# Patient Record
Sex: MALE | Race: OTHER | HISPANIC OR LATINO | Employment: OTHER | URBAN - METROPOLITAN AREA
[De-identification: names, ages, dates, MRNs, and addresses within clinical notes are randomized per-mention and may not be internally consistent; named-entity substitution may affect disease eponyms.]

---

## 2017-01-04 ENCOUNTER — APPOINTMENT (OUTPATIENT)
Dept: LAB | Facility: CLINIC | Age: 82
End: 2017-01-04
Payer: COMMERCIAL

## 2017-01-04 ENCOUNTER — ALLSCRIPTS OFFICE VISIT (OUTPATIENT)
Dept: OTHER | Facility: OTHER | Age: 82
End: 2017-01-04

## 2017-01-04 ENCOUNTER — TRANSCRIBE ORDERS (OUTPATIENT)
Dept: LAB | Facility: CLINIC | Age: 82
End: 2017-01-04

## 2017-01-04 DIAGNOSIS — J18.9 PNEUMONIA: ICD-10-CM

## 2017-01-04 DIAGNOSIS — R53.83 OTHER FATIGUE: ICD-10-CM

## 2017-01-04 DIAGNOSIS — D64.9 ANEMIA: ICD-10-CM

## 2017-01-04 DIAGNOSIS — I10 ESSENTIAL (PRIMARY) HYPERTENSION: ICD-10-CM

## 2017-01-04 DIAGNOSIS — R82.90 ABNORMAL FINDING IN URINE: ICD-10-CM

## 2017-01-04 LAB
ALBUMIN SERPL BCP-MCNC: 2.9 G/DL (ref 3.5–5)
ALP SERPL-CCNC: 64 U/L (ref 46–116)
ALT SERPL W P-5'-P-CCNC: 9 U/L (ref 12–78)
ANION GAP SERPL CALCULATED.3IONS-SCNC: 7 MMOL/L (ref 4–13)
AST SERPL W P-5'-P-CCNC: 20 U/L (ref 5–45)
BACTERIA UR QL AUTO: NORMAL /HPF
BASOPHILS # BLD AUTO: 0.05 THOUSANDS/ΜL (ref 0–0.1)
BASOPHILS NFR BLD AUTO: 1 % (ref 0–1)
BILIRUB SERPL-MCNC: 0.95 MG/DL (ref 0.2–1)
BILIRUB UR QL STRIP: NEGATIVE
BUN SERPL-MCNC: 14 MG/DL (ref 5–25)
CALCIUM SERPL-MCNC: 9.4 MG/DL (ref 8.3–10.1)
CHLORIDE SERPL-SCNC: 100 MMOL/L (ref 100–108)
CLARITY UR: CLEAR
CO2 SERPL-SCNC: 31 MMOL/L (ref 21–32)
COLOR UR: YELLOW
CREAT SERPL-MCNC: 1.03 MG/DL (ref 0.6–1.3)
EOSINOPHIL # BLD AUTO: 0 THOUSAND/ΜL (ref 0–0.61)
EOSINOPHIL NFR BLD AUTO: 0 % (ref 0–6)
ERYTHROCYTE [DISTWIDTH] IN BLOOD BY AUTOMATED COUNT: 12.2 % (ref 11.6–15.1)
GFR SERPL CREATININE-BSD FRML MDRD: >60 ML/MIN/1.73SQ M
GLUCOSE SERPL-MCNC: 447 MG/DL (ref 65–140)
GLUCOSE UR STRIP-MCNC: ABNORMAL MG/DL
HCT VFR BLD AUTO: 33.9 % (ref 36.5–49.3)
HGB BLD-MCNC: 11.2 G/DL (ref 12–17)
HGB UR QL STRIP.AUTO: ABNORMAL
KETONES UR STRIP-MCNC: ABNORMAL MG/DL
LEUKOCYTE ESTERASE UR QL STRIP: NEGATIVE
LYMPHOCYTES # BLD AUTO: 1.5 THOUSANDS/ΜL (ref 0.6–4.47)
LYMPHOCYTES NFR BLD AUTO: 14 % (ref 14–44)
MCH RBC QN AUTO: 29.7 PG (ref 26.8–34.3)
MCHC RBC AUTO-ENTMCNC: 33 G/DL (ref 31.4–37.4)
MCV RBC AUTO: 90 FL (ref 82–98)
MONOCYTES # BLD AUTO: 1.02 THOUSAND/ΜL (ref 0.17–1.22)
MONOCYTES NFR BLD AUTO: 10 % (ref 4–12)
NEUTROPHILS # BLD AUTO: 8.15 THOUSANDS/ΜL (ref 1.85–7.62)
NEUTS SEG NFR BLD AUTO: 75 % (ref 43–75)
NITRITE UR QL STRIP: NEGATIVE
NON-SQ EPI CELLS URNS QL MICRO: NORMAL /HPF
NRBC BLD AUTO-RTO: 0 /100 WBCS
PH UR STRIP.AUTO: 6.5 [PH] (ref 4.5–8)
PLATELET # BLD AUTO: 205 THOUSANDS/UL (ref 149–390)
PMV BLD AUTO: 13.6 FL (ref 8.9–12.7)
POTASSIUM SERPL-SCNC: 3.3 MMOL/L (ref 3.5–5.3)
PROT SERPL-MCNC: 7.6 G/DL (ref 6.4–8.2)
PROT UR STRIP-MCNC: ABNORMAL MG/DL
RBC # BLD AUTO: 3.77 MILLION/UL (ref 3.88–5.62)
RBC #/AREA URNS AUTO: NORMAL /HPF
SODIUM SERPL-SCNC: 138 MMOL/L (ref 136–145)
SP GR UR STRIP.AUTO: 1.03 (ref 1–1.03)
TSH SERPL DL<=0.05 MIU/L-ACNC: 1.19 UIU/ML (ref 0.36–3.74)
UROBILINOGEN UR QL STRIP.AUTO: 1 E.U./DL
WBC # BLD AUTO: 10.77 THOUSAND/UL (ref 4.31–10.16)
WBC #/AREA URNS AUTO: NORMAL /HPF

## 2017-01-04 PROCEDURE — 81001 URINALYSIS AUTO W/SCOPE: CPT

## 2017-01-04 PROCEDURE — 36415 COLL VENOUS BLD VENIPUNCTURE: CPT

## 2017-01-04 PROCEDURE — 84443 ASSAY THYROID STIM HORMONE: CPT

## 2017-01-04 PROCEDURE — 80053 COMPREHEN METABOLIC PANEL: CPT

## 2017-01-04 PROCEDURE — 87086 URINE CULTURE/COLONY COUNT: CPT

## 2017-01-04 PROCEDURE — 85025 COMPLETE CBC W/AUTO DIFF WBC: CPT

## 2017-01-05 LAB — BACTERIA UR CULT: NORMAL

## 2017-01-06 ENCOUNTER — APPOINTMENT (EMERGENCY)
Dept: RADIOLOGY | Facility: HOSPITAL | Age: 82
DRG: 637 | End: 2017-01-06
Payer: COMMERCIAL

## 2017-01-06 ENCOUNTER — GENERIC CONVERSION - ENCOUNTER (OUTPATIENT)
Dept: OTHER | Facility: OTHER | Age: 82
End: 2017-01-06

## 2017-01-06 ENCOUNTER — HOSPITAL ENCOUNTER (INPATIENT)
Facility: HOSPITAL | Age: 82
LOS: 11 days | Discharge: HOME HEALTH | DRG: 637 | End: 2017-01-17
Attending: EMERGENCY MEDICINE | Admitting: INTERNAL MEDICINE
Payer: COMMERCIAL

## 2017-01-06 DIAGNOSIS — J06.9 URI (UPPER RESPIRATORY INFECTION): ICD-10-CM

## 2017-01-06 DIAGNOSIS — R68.89 FLU-LIKE SYMPTOMS: ICD-10-CM

## 2017-01-06 DIAGNOSIS — R26.2 AMBULATORY DYSFUNCTION: ICD-10-CM

## 2017-01-06 DIAGNOSIS — E86.0 DEHYDRATION: ICD-10-CM

## 2017-01-06 DIAGNOSIS — R53.1 WEAKNESS: Primary | ICD-10-CM

## 2017-01-06 DIAGNOSIS — F03.90 DEMENTIA (HCC): ICD-10-CM

## 2017-01-06 DIAGNOSIS — I10 HYPERTENSION: ICD-10-CM

## 2017-01-06 DIAGNOSIS — S90.822A: ICD-10-CM

## 2017-01-06 DIAGNOSIS — G20 PARKINSON'S DISEASE (HCC): ICD-10-CM

## 2017-01-06 LAB
ALBUMIN SERPL BCP-MCNC: 2.8 G/DL (ref 3.5–5)
ALP SERPL-CCNC: 78 U/L (ref 46–116)
ALT SERPL W P-5'-P-CCNC: 9 U/L (ref 12–78)
ANION GAP SERPL CALCULATED.3IONS-SCNC: 7 MMOL/L (ref 4–13)
AST SERPL W P-5'-P-CCNC: 18 U/L (ref 5–45)
ATRIAL RATE: 64 BPM
BACTERIA UR QL AUTO: ABNORMAL /HPF
BASOPHILS # BLD MANUAL: 0 THOUSAND/UL (ref 0–0.1)
BASOPHILS NFR MAR MANUAL: 0 % (ref 0–1)
BILIRUB SERPL-MCNC: 0.77 MG/DL (ref 0.2–1)
BILIRUB UR QL STRIP: NEGATIVE
BUN SERPL-MCNC: 12 MG/DL (ref 5–25)
CALCIUM SERPL-MCNC: 9.4 MG/DL (ref 8.3–10.1)
CHLORIDE SERPL-SCNC: 100 MMOL/L (ref 100–108)
CLARITY UR: CLEAR
CO2 SERPL-SCNC: 34 MMOL/L (ref 21–32)
COLOR UR: ABNORMAL
CREAT SERPL-MCNC: 0.86 MG/DL (ref 0.6–1.3)
EOSINOPHIL # BLD MANUAL: 0.1 THOUSAND/UL (ref 0–0.4)
EOSINOPHIL NFR BLD MANUAL: 1 % (ref 0–6)
ERYTHROCYTE [DISTWIDTH] IN BLOOD BY AUTOMATED COUNT: 12.1 % (ref 11.6–15.1)
FLUAV AG SPEC QL IA: NEGATIVE
FLUBV AG SPEC QL IA: NEGATIVE
GFR SERPL CREATININE-BSD FRML MDRD: >60 ML/MIN/1.73SQ M
GLUCOSE SERPL-MCNC: 375 MG/DL (ref 65–140)
GLUCOSE SERPL-MCNC: 385 MG/DL (ref 65–140)
GLUCOSE UR STRIP-MCNC: ABNORMAL MG/DL
HCT VFR BLD AUTO: 36.1 % (ref 36.5–49.3)
HGB BLD-MCNC: 12.1 G/DL (ref 12–17)
HGB UR QL STRIP.AUTO: ABNORMAL
KETONES UR STRIP-MCNC: ABNORMAL MG/DL
LACTATE SERPL-SCNC: 1.3 MMOL/L (ref 0.5–2)
LEUKOCYTE ESTERASE UR QL STRIP: ABNORMAL
LYMPHOCYTES # BLD AUTO: 1.04 THOUSAND/UL (ref 0.6–4.47)
LYMPHOCYTES # BLD AUTO: 10 % (ref 14–44)
MCH RBC QN AUTO: 30.5 PG (ref 26.8–34.3)
MCHC RBC AUTO-ENTMCNC: 33.5 G/DL (ref 31.4–37.4)
MCV RBC AUTO: 91 FL (ref 82–98)
MONOCYTES # BLD AUTO: 0.83 THOUSAND/UL (ref 0–1.22)
MONOCYTES NFR BLD: 8 % (ref 4–12)
MYELOCYTES NFR BLD MANUAL: 2 % (ref 0–1)
NEUTROPHILS # BLD MANUAL: 8.23 THOUSAND/UL (ref 1.85–7.62)
NEUTS BAND NFR BLD MANUAL: 3 % (ref 0–8)
NEUTS SEG NFR BLD AUTO: 76 % (ref 43–75)
NITRITE UR QL STRIP: NEGATIVE
NON-SQ EPI CELLS URNS QL MICRO: ABNORMAL /HPF
NRBC BLD AUTO-RTO: 0 /100 WBCS
NT-PROBNP SERPL-MCNC: 505 PG/ML
P AXIS: 35 DEGREES
PH UR STRIP.AUTO: 6.5 [PH] (ref 4.5–8)
PLATELET # BLD AUTO: 275 THOUSANDS/UL (ref 149–390)
PLATELET BLD QL SMEAR: ADEQUATE
PMV BLD AUTO: 13.2 FL (ref 8.9–12.7)
POTASSIUM SERPL-SCNC: 3.6 MMOL/L (ref 3.5–5.3)
PR INTERVAL: 112 MS
PROT SERPL-MCNC: 7.8 G/DL (ref 6.4–8.2)
PROT UR STRIP-MCNC: ABNORMAL MG/DL
QRS AXIS: 20 DEGREES
QRSD INTERVAL: 134 MS
QT INTERVAL: 446 MS
QTC INTERVAL: 460 MS
RBC # BLD AUTO: 3.97 MILLION/UL (ref 3.88–5.62)
RBC #/AREA URNS AUTO: ABNORMAL /HPF
RBC MORPH BLD: NORMAL
SODIUM SERPL-SCNC: 141 MMOL/L (ref 136–145)
SP GR UR STRIP.AUTO: 1.02 (ref 1–1.03)
SPECIMEN SOURCE: NORMAL
SPECIMEN SOURCE: NORMAL
T WAVE AXIS: 12 DEGREES
TOTAL CELLS COUNTED SPEC: 100
TROPONIN I BLD-MCNC: 0 NG/ML (ref 0–0.08)
TROPONIN I BLD-MCNC: 0 NG/ML (ref 0–0.08)
UROBILINOGEN UR QL STRIP.AUTO: 1 E.U./DL
VENTRICULAR RATE: 64 BPM
WBC # BLD AUTO: 10.42 THOUSAND/UL (ref 4.31–10.16)
WBC #/AREA URNS AUTO: ABNORMAL /HPF

## 2017-01-06 PROCEDURE — 96360 HYDRATION IV INFUSION INIT: CPT

## 2017-01-06 PROCEDURE — 85007 BL SMEAR W/DIFF WBC COUNT: CPT | Performed by: EMERGENCY MEDICINE

## 2017-01-06 PROCEDURE — 81002 URINALYSIS NONAUTO W/O SCOPE: CPT | Performed by: EMERGENCY MEDICINE

## 2017-01-06 PROCEDURE — 71020 HB CHEST X-RAY 2VW FRONTAL&LATL: CPT

## 2017-01-06 PROCEDURE — 80053 COMPREHEN METABOLIC PANEL: CPT

## 2017-01-06 PROCEDURE — 87400 INFLUENZA A/B EACH AG IA: CPT | Performed by: EMERGENCY MEDICINE

## 2017-01-06 PROCEDURE — 83880 ASSAY OF NATRIURETIC PEPTIDE: CPT | Performed by: EMERGENCY MEDICINE

## 2017-01-06 PROCEDURE — 99285 EMERGENCY DEPT VISIT HI MDM: CPT

## 2017-01-06 PROCEDURE — 94760 N-INVAS EAR/PLS OXIMETRY 1: CPT

## 2017-01-06 PROCEDURE — 81001 URINALYSIS AUTO W/SCOPE: CPT

## 2017-01-06 PROCEDURE — 93005 ELECTROCARDIOGRAM TRACING: CPT

## 2017-01-06 PROCEDURE — 94640 AIRWAY INHALATION TREATMENT: CPT

## 2017-01-06 PROCEDURE — 82948 REAGENT STRIP/BLOOD GLUCOSE: CPT

## 2017-01-06 PROCEDURE — 83605 ASSAY OF LACTIC ACID: CPT | Performed by: EMERGENCY MEDICINE

## 2017-01-06 PROCEDURE — 85027 COMPLETE CBC AUTOMATED: CPT | Performed by: EMERGENCY MEDICINE

## 2017-01-06 PROCEDURE — 87798 DETECT AGENT NOS DNA AMP: CPT | Performed by: EMERGENCY MEDICINE

## 2017-01-06 PROCEDURE — 84484 ASSAY OF TROPONIN QUANT: CPT

## 2017-01-06 PROCEDURE — 87086 URINE CULTURE/COLONY COUNT: CPT

## 2017-01-06 PROCEDURE — 36415 COLL VENOUS BLD VENIPUNCTURE: CPT | Performed by: EMERGENCY MEDICINE

## 2017-01-06 PROCEDURE — 93005 ELECTROCARDIOGRAM TRACING: CPT | Performed by: EMERGENCY MEDICINE

## 2017-01-06 RX ORDER — INSULIN GLARGINE 100 [IU]/ML
10 INJECTION, SOLUTION SUBCUTANEOUS
Status: DISCONTINUED | OUTPATIENT
Start: 2017-01-06 | End: 2017-01-07

## 2017-01-06 RX ORDER — PANTOPRAZOLE SODIUM 20 MG/1
20 TABLET, DELAYED RELEASE ORAL
Status: DISCONTINUED | OUTPATIENT
Start: 2017-01-07 | End: 2017-01-17 | Stop reason: HOSPADM

## 2017-01-06 RX ORDER — HALOPERIDOL 5 MG/ML
0.5 INJECTION INTRAMUSCULAR ONCE
Status: DISCONTINUED | OUTPATIENT
Start: 2017-01-06 | End: 2017-01-16

## 2017-01-06 RX ORDER — ACETAMINOPHEN 325 MG/1
650 TABLET ORAL EVERY 6 HOURS PRN
Status: DISCONTINUED | OUTPATIENT
Start: 2017-01-06 | End: 2017-01-17 | Stop reason: HOSPADM

## 2017-01-06 RX ORDER — OLANZAPINE 20 MG/1
20 TABLET ORAL
COMMUNITY
End: 2018-04-25 | Stop reason: SDUPTHER

## 2017-01-06 RX ORDER — OSELTAMIVIR PHOSPHATE 75 MG/1
75 CAPSULE ORAL EVERY 12 HOURS SCHEDULED
Status: DISCONTINUED | OUTPATIENT
Start: 2017-01-06 | End: 2017-01-07

## 2017-01-06 RX ORDER — ASPIRIN AND DIPYRIDAMOLE 25; 200 MG/1; MG/1
1 CAPSULE, EXTENDED RELEASE ORAL 2 TIMES DAILY
COMMUNITY

## 2017-01-06 RX ORDER — CARBIDOPA AND LEVODOPA 50; 200 MG/1; MG/1
1 TABLET, EXTENDED RELEASE ORAL 3 TIMES DAILY
Status: DISCONTINUED | OUTPATIENT
Start: 2017-01-06 | End: 2017-01-17 | Stop reason: HOSPADM

## 2017-01-06 RX ORDER — HYDRALAZINE HYDROCHLORIDE 20 MG/ML
10 INJECTION INTRAMUSCULAR; INTRAVENOUS EVERY 4 HOURS PRN
Status: DISCONTINUED | OUTPATIENT
Start: 2017-01-06 | End: 2017-01-17 | Stop reason: HOSPADM

## 2017-01-06 RX ORDER — LORATADINE 10 MG/1
10 TABLET ORAL DAILY
Status: DISCONTINUED | OUTPATIENT
Start: 2017-01-07 | End: 2017-01-17 | Stop reason: HOSPADM

## 2017-01-06 RX ORDER — OLANZAPINE 10 MG/1
20 TABLET ORAL
Status: DISCONTINUED | OUTPATIENT
Start: 2017-01-06 | End: 2017-01-17 | Stop reason: HOSPADM

## 2017-01-06 RX ORDER — LEVALBUTEROL 1.25 MG/.5ML
1.25 SOLUTION, CONCENTRATE RESPIRATORY (INHALATION)
Status: DISCONTINUED | OUTPATIENT
Start: 2017-01-06 | End: 2017-01-08

## 2017-01-06 RX ORDER — RIVASTIGMINE 4.6 MG/24H
1 PATCH, EXTENDED RELEASE TRANSDERMAL DAILY
Status: DISCONTINUED | OUTPATIENT
Start: 2017-01-07 | End: 2017-01-09

## 2017-01-06 RX ORDER — PRAVASTATIN SODIUM 80 MG/1
80 TABLET ORAL
Status: DISCONTINUED | OUTPATIENT
Start: 2017-01-06 | End: 2017-01-17 | Stop reason: HOSPADM

## 2017-01-06 RX ORDER — OMEPRAZOLE 20 MG/1
20 CAPSULE, DELAYED RELEASE ORAL DAILY
Status: ON HOLD | COMMUNITY
End: 2018-08-06

## 2017-01-06 RX ORDER — CHOLECALCIFEROL (VITAMIN D3) 50 MCG
2000 TABLET ORAL DAILY
COMMUNITY
End: 2018-02-21 | Stop reason: SDUPTHER

## 2017-01-06 RX ORDER — LORATADINE 10 MG/1
10 TABLET ORAL DAILY
COMMUNITY
End: 2018-08-06 | Stop reason: HOSPADM

## 2017-01-06 RX ORDER — LISINOPRIL 20 MG/1
20 TABLET ORAL DAILY
Status: DISCONTINUED | OUTPATIENT
Start: 2017-01-07 | End: 2017-01-17 | Stop reason: HOSPADM

## 2017-01-06 RX ORDER — SIMVASTATIN 40 MG
40 TABLET ORAL
COMMUNITY
End: 2018-04-25 | Stop reason: ALTCHOICE

## 2017-01-06 RX ORDER — SODIUM CHLORIDE 9 MG/ML
60 INJECTION, SOLUTION INTRAVENOUS CONTINUOUS
Status: DISCONTINUED | OUTPATIENT
Start: 2017-01-06 | End: 2017-01-07

## 2017-01-06 RX ORDER — LISINOPRIL 20 MG/1
20 TABLET ORAL DAILY
COMMUNITY
End: 2018-02-21 | Stop reason: SDUPTHER

## 2017-01-06 RX ORDER — INSULIN GLARGINE 100 [IU]/ML
10 INJECTION, SOLUTION SUBCUTANEOUS
COMMUNITY
End: 2017-01-17 | Stop reason: HOSPADM

## 2017-01-06 RX ORDER — CITALOPRAM 20 MG/1
20 TABLET ORAL DAILY
COMMUNITY
End: 2018-04-25 | Stop reason: SDUPTHER

## 2017-01-06 RX ORDER — HEPARIN SODIUM 5000 [USP'U]/ML
5000 INJECTION, SOLUTION INTRAVENOUS; SUBCUTANEOUS EVERY 8 HOURS SCHEDULED
Status: DISCONTINUED | OUTPATIENT
Start: 2017-01-06 | End: 2017-01-17 | Stop reason: HOSPADM

## 2017-01-06 RX ORDER — CITALOPRAM 20 MG/1
20 TABLET ORAL DAILY
Status: DISCONTINUED | OUTPATIENT
Start: 2017-01-07 | End: 2017-01-17 | Stop reason: HOSPADM

## 2017-01-06 RX ORDER — DOCUSATE SODIUM 100 MG/1
100 CAPSULE, LIQUID FILLED ORAL 2 TIMES DAILY
Status: DISCONTINUED | OUTPATIENT
Start: 2017-01-06 | End: 2017-01-17 | Stop reason: HOSPADM

## 2017-01-06 RX ORDER — RIVASTIGMINE 4.6 MG/24H
1 PATCH, EXTENDED RELEASE TRANSDERMAL DAILY
COMMUNITY
End: 2018-04-25

## 2017-01-06 RX ORDER — GUAIFENESIN 600 MG
600 TABLET, EXTENDED RELEASE 12 HR ORAL EVERY 12 HOURS
Status: DISCONTINUED | OUTPATIENT
Start: 2017-01-06 | End: 2017-01-16

## 2017-01-06 RX ORDER — ASPIRIN AND DIPYRIDAMOLE 25; 200 MG/1; MG/1
1 CAPSULE, EXTENDED RELEASE ORAL EVERY 12 HOURS
Status: DISCONTINUED | OUTPATIENT
Start: 2017-01-06 | End: 2017-01-17 | Stop reason: HOSPADM

## 2017-01-06 RX ADMIN — PRAVASTATIN SODIUM 80 MG: 80 TABLET ORAL at 22:29

## 2017-01-06 RX ADMIN — CARBIDOPA AND LEVODOPA 1 TABLET: 50; 200 TABLET, EXTENDED RELEASE ORAL at 22:00

## 2017-01-06 RX ADMIN — GUAIFENESIN 600 MG: 600 TABLET, EXTENDED RELEASE ORAL at 22:00

## 2017-01-06 RX ADMIN — IPRATROPIUM BROMIDE 0.5 MG: 0.5 SOLUTION RESPIRATORY (INHALATION) at 15:09

## 2017-01-06 RX ADMIN — HYDRALAZINE HYDROCHLORIDE 10 MG: 20 INJECTION INTRAMUSCULAR; INTRAVENOUS at 23:33

## 2017-01-06 RX ADMIN — SODIUM CHLORIDE 60 ML/HR: 0.9 INJECTION, SOLUTION INTRAVENOUS at 20:00

## 2017-01-06 RX ADMIN — HEPARIN SODIUM 5000 UNITS: 5000 INJECTION, SOLUTION INTRAVENOUS; SUBCUTANEOUS at 22:17

## 2017-01-06 RX ADMIN — IPRATROPIUM BROMIDE 0.5 MG: 0.5 SOLUTION RESPIRATORY (INHALATION) at 20:28

## 2017-01-06 RX ADMIN — SODIUM CHLORIDE 1000 ML: 0.9 INJECTION, SOLUTION INTRAVENOUS at 14:57

## 2017-01-06 RX ADMIN — ACETAMINOPHEN 650 MG: 325 TABLET, FILM COATED ORAL at 22:17

## 2017-01-06 RX ADMIN — OSELTAMIVIR PHOSPHATE 75 MG: 75 CAPSULE ORAL at 22:00

## 2017-01-06 RX ADMIN — INSULIN GLARGINE 10 UNITS: 100 INJECTION, SOLUTION SUBCUTANEOUS at 22:10

## 2017-01-06 RX ADMIN — AZITHROMYCIN MONOHYDRATE 500 MG: 500 INJECTION, POWDER, LYOPHILIZED, FOR SOLUTION INTRAVENOUS at 20:00

## 2017-01-06 RX ADMIN — OLANZAPINE 20 MG: 10 TABLET, FILM COATED ORAL at 22:00

## 2017-01-06 RX ADMIN — DOCUSATE SODIUM 100 MG: 100 CAPSULE, LIQUID FILLED ORAL at 22:30

## 2017-01-06 RX ADMIN — ALBUTEROL SULFATE 5 MG: 2.5 SOLUTION RESPIRATORY (INHALATION) at 15:09

## 2017-01-06 RX ADMIN — ASPIRIN AND DIPYRIDAMOLE 1 CAPSULE: 25; 200 CAPSULE, EXTENDED RELEASE ORAL at 22:00

## 2017-01-06 RX ADMIN — LEVALBUTEROL 1.25 MG: 1.25 SOLUTION, CONCENTRATE RESPIRATORY (INHALATION) at 20:28

## 2017-01-07 ENCOUNTER — APPOINTMENT (INPATIENT)
Dept: RADIOLOGY | Facility: HOSPITAL | Age: 82
DRG: 637 | End: 2017-01-07
Payer: COMMERCIAL

## 2017-01-07 LAB
ANION GAP SERPL CALCULATED.3IONS-SCNC: 7 MMOL/L (ref 4–13)
BACTERIA UR CULT: NORMAL
BUN SERPL-MCNC: 11 MG/DL (ref 5–25)
CALCIUM SERPL-MCNC: 8.3 MG/DL (ref 8.3–10.1)
CHLORIDE SERPL-SCNC: 105 MMOL/L (ref 100–108)
CO2 SERPL-SCNC: 29 MMOL/L (ref 21–32)
CREAT SERPL-MCNC: 0.71 MG/DL (ref 0.6–1.3)
ERYTHROCYTE [DISTWIDTH] IN BLOOD BY AUTOMATED COUNT: 12.1 % (ref 11.6–15.1)
EST. AVERAGE GLUCOSE BLD GHB EST-MCNC: 252 MG/DL
FLUAV AG SPEC QL: NORMAL
FLUBV AG SPEC QL: NORMAL
GFR SERPL CREATININE-BSD FRML MDRD: >60 ML/MIN/1.73SQ M
GLUCOSE SERPL-MCNC: 161 MG/DL (ref 65–140)
GLUCOSE SERPL-MCNC: 170 MG/DL (ref 65–140)
GLUCOSE SERPL-MCNC: 223 MG/DL (ref 65–140)
GLUCOSE SERPL-MCNC: 294 MG/DL (ref 65–140)
GLUCOSE SERPL-MCNC: 321 MG/DL (ref 65–140)
GLUCOSE SERPL-MCNC: >500 MG/DL (ref 65–140)
HBA1C MFR BLD: 10.4 % (ref 4.2–6.3)
HCT VFR BLD AUTO: 30.4 % (ref 36.5–49.3)
HGB BLD-MCNC: 10.1 G/DL (ref 12–17)
MCH RBC QN AUTO: 29.8 PG (ref 26.8–34.3)
MCHC RBC AUTO-ENTMCNC: 33.2 G/DL (ref 31.4–37.4)
MCV RBC AUTO: 90 FL (ref 82–98)
PLATELET # BLD AUTO: 239 THOUSANDS/UL (ref 149–390)
PMV BLD AUTO: 12.8 FL (ref 8.9–12.7)
POTASSIUM SERPL-SCNC: 2.5 MMOL/L (ref 3.5–5.3)
POTASSIUM SERPL-SCNC: 2.6 MMOL/L (ref 3.5–5.3)
RBC # BLD AUTO: 3.39 MILLION/UL (ref 3.88–5.62)
RSV B RNA SPEC QL NAA+PROBE: NORMAL
SODIUM SERPL-SCNC: 141 MMOL/L (ref 136–145)
WBC # BLD AUTO: 8.98 THOUSAND/UL (ref 4.31–10.16)

## 2017-01-07 PROCEDURE — 94640 AIRWAY INHALATION TREATMENT: CPT

## 2017-01-07 PROCEDURE — 85027 COMPLETE CBC AUTOMATED: CPT | Performed by: PHYSICIAN ASSISTANT

## 2017-01-07 PROCEDURE — 83036 HEMOGLOBIN GLYCOSYLATED A1C: CPT | Performed by: PHYSICIAN ASSISTANT

## 2017-01-07 PROCEDURE — 84132 ASSAY OF SERUM POTASSIUM: CPT | Performed by: PHYSICIAN ASSISTANT

## 2017-01-07 PROCEDURE — 73630 X-RAY EXAM OF FOOT: CPT

## 2017-01-07 PROCEDURE — 94760 N-INVAS EAR/PLS OXIMETRY 1: CPT

## 2017-01-07 PROCEDURE — 80048 BASIC METABOLIC PNL TOTAL CA: CPT | Performed by: PHYSICIAN ASSISTANT

## 2017-01-07 PROCEDURE — 82948 REAGENT STRIP/BLOOD GLUCOSE: CPT

## 2017-01-07 RX ORDER — SODIUM CHLORIDE AND POTASSIUM CHLORIDE .9; .15 G/100ML; G/100ML
60 SOLUTION INTRAVENOUS CONTINUOUS
Status: DISCONTINUED | OUTPATIENT
Start: 2017-01-07 | End: 2017-01-07

## 2017-01-07 RX ORDER — INSULIN GLARGINE 100 [IU]/ML
20 INJECTION, SOLUTION SUBCUTANEOUS
Status: DISCONTINUED | OUTPATIENT
Start: 2017-01-07 | End: 2017-01-07

## 2017-01-07 RX ORDER — INSULIN GLARGINE 100 [IU]/ML
10 INJECTION, SOLUTION SUBCUTANEOUS
Status: DISCONTINUED | OUTPATIENT
Start: 2017-01-07 | End: 2017-01-09

## 2017-01-07 RX ORDER — POTASSIUM CHLORIDE 20 MEQ/1
20 TABLET, EXTENDED RELEASE ORAL ONCE
Status: COMPLETED | OUTPATIENT
Start: 2017-01-07 | End: 2017-01-07

## 2017-01-07 RX ORDER — POTASSIUM CHLORIDE 20 MEQ/1
20 TABLET, EXTENDED RELEASE ORAL
Status: DISCONTINUED | OUTPATIENT
Start: 2017-01-07 | End: 2017-01-14

## 2017-01-07 RX ADMIN — LORATADINE 10 MG: 10 TABLET ORAL at 09:38

## 2017-01-07 RX ADMIN — AZITHROMYCIN MONOHYDRATE 500 MG: 500 INJECTION, POWDER, LYOPHILIZED, FOR SOLUTION INTRAVENOUS at 18:50

## 2017-01-07 RX ADMIN — IPRATROPIUM BROMIDE 0.5 MG: 0.5 SOLUTION RESPIRATORY (INHALATION) at 20:42

## 2017-01-07 RX ADMIN — DOCUSATE SODIUM 100 MG: 100 CAPSULE, LIQUID FILLED ORAL at 17:13

## 2017-01-07 RX ADMIN — HEPARIN SODIUM 5000 UNITS: 5000 INJECTION, SOLUTION INTRAVENOUS; SUBCUTANEOUS at 21:27

## 2017-01-07 RX ADMIN — CARBIDOPA AND LEVODOPA 1 TABLET: 50; 200 TABLET, EXTENDED RELEASE ORAL at 21:27

## 2017-01-07 RX ADMIN — LISINOPRIL 20 MG: 20 TABLET ORAL at 09:41

## 2017-01-07 RX ADMIN — HEPARIN SODIUM 5000 UNITS: 5000 INJECTION, SOLUTION INTRAVENOUS; SUBCUTANEOUS at 13:10

## 2017-01-07 RX ADMIN — LEVALBUTEROL 1.25 MG: 1.25 SOLUTION, CONCENTRATE RESPIRATORY (INHALATION) at 20:42

## 2017-01-07 RX ADMIN — POTASSIUM CHLORIDE 20 MEQ: 1500 TABLET, EXTENDED RELEASE ORAL at 16:21

## 2017-01-07 RX ADMIN — IPRATROPIUM BROMIDE 0.5 MG: 0.5 SOLUTION RESPIRATORY (INHALATION) at 07:14

## 2017-01-07 RX ADMIN — LEVALBUTEROL 1.25 MG: 1.25 SOLUTION, CONCENTRATE RESPIRATORY (INHALATION) at 07:14

## 2017-01-07 RX ADMIN — OLANZAPINE 20 MG: 10 TABLET, FILM COATED ORAL at 21:26

## 2017-01-07 RX ADMIN — INSULIN LISPRO 2 UNITS: 100 INJECTION, SOLUTION INTRAVENOUS; SUBCUTANEOUS at 09:39

## 2017-01-07 RX ADMIN — INSULIN LISPRO 8 UNITS: 100 INJECTION, SOLUTION INTRAVENOUS; SUBCUTANEOUS at 17:13

## 2017-01-07 RX ADMIN — GUAIFENESIN 600 MG: 600 TABLET, EXTENDED RELEASE ORAL at 09:41

## 2017-01-07 RX ADMIN — INSULIN LISPRO 6 UNITS: 100 INJECTION, SOLUTION INTRAVENOUS; SUBCUTANEOUS at 13:05

## 2017-01-07 RX ADMIN — PRAVASTATIN SODIUM 80 MG: 80 TABLET ORAL at 16:21

## 2017-01-07 RX ADMIN — INSULIN GLARGINE 10 UNITS: 100 INJECTION, SOLUTION SUBCUTANEOUS at 21:27

## 2017-01-07 RX ADMIN — HEPARIN SODIUM 5000 UNITS: 5000 INJECTION, SOLUTION INTRAVENOUS; SUBCUTANEOUS at 06:38

## 2017-01-07 RX ADMIN — GUAIFENESIN 600 MG: 600 TABLET, EXTENDED RELEASE ORAL at 21:27

## 2017-01-07 RX ADMIN — IPRATROPIUM BROMIDE 0.5 MG: 0.5 SOLUTION RESPIRATORY (INHALATION) at 13:14

## 2017-01-07 RX ADMIN — POTASSIUM CHLORIDE 20 MEQ: 1500 TABLET, EXTENDED RELEASE ORAL at 06:30

## 2017-01-07 RX ADMIN — LEVALBUTEROL 1.25 MG: 1.25 SOLUTION, CONCENTRATE RESPIRATORY (INHALATION) at 13:14

## 2017-01-07 RX ADMIN — CITALOPRAM HYDROBROMIDE 20 MG: 20 TABLET ORAL at 09:37

## 2017-01-07 RX ADMIN — CARBIDOPA AND LEVODOPA 1 TABLET: 50; 200 TABLET, EXTENDED RELEASE ORAL at 09:39

## 2017-01-07 RX ADMIN — ASPIRIN AND DIPYRIDAMOLE 1 CAPSULE: 25; 200 CAPSULE, EXTENDED RELEASE ORAL at 21:27

## 2017-01-07 RX ADMIN — DOCUSATE SODIUM 100 MG: 100 CAPSULE, LIQUID FILLED ORAL at 09:37

## 2017-01-07 RX ADMIN — POTASSIUM CHLORIDE 20 MEQ: 1500 TABLET, EXTENDED RELEASE ORAL at 13:05

## 2017-01-07 RX ADMIN — PANTOPRAZOLE SODIUM 20 MG: 20 TABLET, DELAYED RELEASE ORAL at 06:38

## 2017-01-07 RX ADMIN — SODIUM CHLORIDE AND POTASSIUM CHLORIDE 60 ML/HR: .9; .15 SOLUTION INTRAVENOUS at 07:15

## 2017-01-07 RX ADMIN — RIVASTIGMINE TRANSDERMAL SYSTEM 1 PATCH: 4.6 PATCH, EXTENDED RELEASE TRANSDERMAL at 09:38

## 2017-01-07 RX ADMIN — CARBIDOPA AND LEVODOPA 1 TABLET: 50; 200 TABLET, EXTENDED RELEASE ORAL at 16:21

## 2017-01-07 RX ADMIN — ASPIRIN AND DIPYRIDAMOLE 1 CAPSULE: 25; 200 CAPSULE, EXTENDED RELEASE ORAL at 09:38

## 2017-01-07 RX ADMIN — OSELTAMIVIR PHOSPHATE 75 MG: 75 CAPSULE ORAL at 09:38

## 2017-01-08 LAB
ANION GAP SERPL CALCULATED.3IONS-SCNC: 7 MMOL/L (ref 4–13)
BUN SERPL-MCNC: 7 MG/DL (ref 5–25)
CALCIUM SERPL-MCNC: 8.2 MG/DL (ref 8.3–10.1)
CHLORIDE SERPL-SCNC: 110 MMOL/L (ref 100–108)
CO2 SERPL-SCNC: 28 MMOL/L (ref 21–32)
CREAT SERPL-MCNC: 0.6 MG/DL (ref 0.6–1.3)
GFR SERPL CREATININE-BSD FRML MDRD: >60 ML/MIN/1.73SQ M
GLUCOSE SERPL-MCNC: 120 MG/DL (ref 65–140)
GLUCOSE SERPL-MCNC: 122 MG/DL (ref 65–140)
GLUCOSE SERPL-MCNC: 222 MG/DL (ref 65–140)
GLUCOSE SERPL-MCNC: 229 MG/DL (ref 65–140)
GLUCOSE SERPL-MCNC: 269 MG/DL (ref 65–140)
POTASSIUM SERPL-SCNC: 3.3 MMOL/L (ref 3.5–5.3)
SODIUM SERPL-SCNC: 145 MMOL/L (ref 136–145)

## 2017-01-08 PROCEDURE — G8979 MOBILITY GOAL STATUS: HCPCS

## 2017-01-08 PROCEDURE — 94640 AIRWAY INHALATION TREATMENT: CPT

## 2017-01-08 PROCEDURE — 94760 N-INVAS EAR/PLS OXIMETRY 1: CPT

## 2017-01-08 PROCEDURE — 82948 REAGENT STRIP/BLOOD GLUCOSE: CPT

## 2017-01-08 PROCEDURE — G8978 MOBILITY CURRENT STATUS: HCPCS

## 2017-01-08 PROCEDURE — 97530 THERAPEUTIC ACTIVITIES: CPT

## 2017-01-08 PROCEDURE — 80048 BASIC METABOLIC PNL TOTAL CA: CPT | Performed by: INTERNAL MEDICINE

## 2017-01-08 PROCEDURE — 97163 PT EVAL HIGH COMPLEX 45 MIN: CPT

## 2017-01-08 RX ORDER — LEVALBUTEROL 1.25 MG/.5ML
1.25 SOLUTION, CONCENTRATE RESPIRATORY (INHALATION) EVERY 8 HOURS PRN
Status: DISCONTINUED | OUTPATIENT
Start: 2017-01-08 | End: 2017-01-12

## 2017-01-08 RX ADMIN — GUAIFENESIN 600 MG: 600 TABLET, EXTENDED RELEASE ORAL at 20:59

## 2017-01-08 RX ADMIN — CITALOPRAM HYDROBROMIDE 20 MG: 20 TABLET ORAL at 10:12

## 2017-01-08 RX ADMIN — HEPARIN SODIUM 5000 UNITS: 5000 INJECTION, SOLUTION INTRAVENOUS; SUBCUTANEOUS at 21:01

## 2017-01-08 RX ADMIN — AZITHROMYCIN MONOHYDRATE 500 MG: 500 INJECTION, POWDER, LYOPHILIZED, FOR SOLUTION INTRAVENOUS at 20:55

## 2017-01-08 RX ADMIN — LISINOPRIL 20 MG: 20 TABLET ORAL at 10:12

## 2017-01-08 RX ADMIN — INSULIN GLARGINE 10 UNITS: 100 INJECTION, SOLUTION SUBCUTANEOUS at 21:02

## 2017-01-08 RX ADMIN — INSULIN LISPRO 6 UNITS: 100 INJECTION, SOLUTION INTRAVENOUS; SUBCUTANEOUS at 17:51

## 2017-01-08 RX ADMIN — DOCUSATE SODIUM 100 MG: 100 CAPSULE, LIQUID FILLED ORAL at 10:13

## 2017-01-08 RX ADMIN — ACETAMINOPHEN 650 MG: 325 TABLET, FILM COATED ORAL at 11:59

## 2017-01-08 RX ADMIN — RIVASTIGMINE TRANSDERMAL SYSTEM 1 PATCH: 4.6 PATCH, EXTENDED RELEASE TRANSDERMAL at 10:16

## 2017-01-08 RX ADMIN — POTASSIUM CHLORIDE 20 MEQ: 1500 TABLET, EXTENDED RELEASE ORAL at 17:51

## 2017-01-08 RX ADMIN — INSULIN LISPRO 4 UNITS: 100 INJECTION, SOLUTION INTRAVENOUS; SUBCUTANEOUS at 11:59

## 2017-01-08 RX ADMIN — CARBIDOPA AND LEVODOPA 1 TABLET: 50; 200 TABLET, EXTENDED RELEASE ORAL at 21:00

## 2017-01-08 RX ADMIN — ASPIRIN AND DIPYRIDAMOLE 1 CAPSULE: 25; 200 CAPSULE, EXTENDED RELEASE ORAL at 10:12

## 2017-01-08 RX ADMIN — OLANZAPINE 20 MG: 10 TABLET, FILM COATED ORAL at 21:00

## 2017-01-08 RX ADMIN — GUAIFENESIN 600 MG: 600 TABLET, EXTENDED RELEASE ORAL at 10:12

## 2017-01-08 RX ADMIN — CARBIDOPA AND LEVODOPA 1 TABLET: 50; 200 TABLET, EXTENDED RELEASE ORAL at 17:51

## 2017-01-08 RX ADMIN — HEPARIN SODIUM 5000 UNITS: 5000 INJECTION, SOLUTION INTRAVENOUS; SUBCUTANEOUS at 06:50

## 2017-01-08 RX ADMIN — LORATADINE 10 MG: 10 TABLET ORAL at 10:13

## 2017-01-08 RX ADMIN — LEVALBUTEROL 1.25 MG: 1.25 SOLUTION, CONCENTRATE RESPIRATORY (INHALATION) at 07:08

## 2017-01-08 RX ADMIN — HEPARIN SODIUM 5000 UNITS: 5000 INJECTION, SOLUTION INTRAVENOUS; SUBCUTANEOUS at 14:50

## 2017-01-08 RX ADMIN — PRAVASTATIN SODIUM 80 MG: 80 TABLET ORAL at 17:51

## 2017-01-08 RX ADMIN — ASPIRIN AND DIPYRIDAMOLE 1 CAPSULE: 25; 200 CAPSULE, EXTENDED RELEASE ORAL at 21:00

## 2017-01-08 RX ADMIN — POTASSIUM CHLORIDE 20 MEQ: 1500 TABLET, EXTENDED RELEASE ORAL at 10:13

## 2017-01-08 RX ADMIN — DOCUSATE SODIUM 100 MG: 100 CAPSULE, LIQUID FILLED ORAL at 17:51

## 2017-01-08 RX ADMIN — PANTOPRAZOLE SODIUM 20 MG: 20 TABLET, DELAYED RELEASE ORAL at 06:59

## 2017-01-08 RX ADMIN — IPRATROPIUM BROMIDE 0.5 MG: 0.5 SOLUTION RESPIRATORY (INHALATION) at 07:08

## 2017-01-08 RX ADMIN — CARBIDOPA AND LEVODOPA 1 TABLET: 50; 200 TABLET, EXTENDED RELEASE ORAL at 10:13

## 2017-01-08 RX ADMIN — POTASSIUM CHLORIDE 20 MEQ: 1500 TABLET, EXTENDED RELEASE ORAL at 11:59

## 2017-01-09 ENCOUNTER — APPOINTMENT (INPATIENT)
Dept: ULTRASOUND IMAGING | Facility: HOSPITAL | Age: 82
DRG: 637 | End: 2017-01-09
Payer: COMMERCIAL

## 2017-01-09 LAB
ANION GAP SERPL CALCULATED.3IONS-SCNC: 9 MMOL/L (ref 4–13)
BUN SERPL-MCNC: 7 MG/DL (ref 5–25)
CALCIUM SERPL-MCNC: 8.3 MG/DL (ref 8.3–10.1)
CHLORIDE SERPL-SCNC: 104 MMOL/L (ref 100–108)
CO2 SERPL-SCNC: 26 MMOL/L (ref 21–32)
CREAT SERPL-MCNC: 0.7 MG/DL (ref 0.6–1.3)
GFR SERPL CREATININE-BSD FRML MDRD: >60 ML/MIN/1.73SQ M
GLUCOSE SERPL-MCNC: 111 MG/DL (ref 65–140)
GLUCOSE SERPL-MCNC: 187 MG/DL (ref 65–140)
GLUCOSE SERPL-MCNC: 189 MG/DL (ref 65–140)
GLUCOSE SERPL-MCNC: 215 MG/DL (ref 65–140)
GLUCOSE SERPL-MCNC: 276 MG/DL (ref 65–140)
POTASSIUM SERPL-SCNC: 4.2 MMOL/L (ref 3.5–5.3)
PSA SERPL-MCNC: 14.4 NG/ML (ref 0–4)
SODIUM SERPL-SCNC: 139 MMOL/L (ref 136–145)

## 2017-01-09 PROCEDURE — 82948 REAGENT STRIP/BLOOD GLUCOSE: CPT

## 2017-01-09 PROCEDURE — 97530 THERAPEUTIC ACTIVITIES: CPT

## 2017-01-09 PROCEDURE — 97112 NEUROMUSCULAR REEDUCATION: CPT

## 2017-01-09 PROCEDURE — 94760 N-INVAS EAR/PLS OXIMETRY 1: CPT

## 2017-01-09 PROCEDURE — 76770 US EXAM ABDO BACK WALL COMP: CPT

## 2017-01-09 PROCEDURE — G0103 PSA SCREENING: HCPCS | Performed by: HOSPITALIST

## 2017-01-09 PROCEDURE — 80048 BASIC METABOLIC PNL TOTAL CA: CPT | Performed by: HOSPITALIST

## 2017-01-09 RX ORDER — POTASSIUM CHLORIDE 20MEQ/15ML
40 LIQUID (ML) ORAL ONCE
Status: DISCONTINUED | OUTPATIENT
Start: 2017-01-09 | End: 2017-01-16

## 2017-01-09 RX ORDER — INSULIN GLARGINE 100 [IU]/ML
15 INJECTION, SOLUTION SUBCUTANEOUS
Status: DISCONTINUED | OUTPATIENT
Start: 2017-01-09 | End: 2017-01-11

## 2017-01-09 RX ADMIN — CITALOPRAM HYDROBROMIDE 20 MG: 20 TABLET ORAL at 08:41

## 2017-01-09 RX ADMIN — POTASSIUM CHLORIDE 20 MEQ: 1500 TABLET, EXTENDED RELEASE ORAL at 11:38

## 2017-01-09 RX ADMIN — POTASSIUM CHLORIDE 20 MEQ: 1500 TABLET, EXTENDED RELEASE ORAL at 16:05

## 2017-01-09 RX ADMIN — PANTOPRAZOLE SODIUM 20 MG: 20 TABLET, DELAYED RELEASE ORAL at 05:28

## 2017-01-09 RX ADMIN — DOCUSATE SODIUM 100 MG: 100 CAPSULE, LIQUID FILLED ORAL at 17:23

## 2017-01-09 RX ADMIN — GUAIFENESIN 600 MG: 600 TABLET, EXTENDED RELEASE ORAL at 20:18

## 2017-01-09 RX ADMIN — POTASSIUM CHLORIDE 20 MEQ: 1500 TABLET, EXTENDED RELEASE ORAL at 08:41

## 2017-01-09 RX ADMIN — CARBIDOPA AND LEVODOPA 1 TABLET: 50; 200 TABLET, EXTENDED RELEASE ORAL at 08:41

## 2017-01-09 RX ADMIN — ASPIRIN AND DIPYRIDAMOLE 1 CAPSULE: 25; 200 CAPSULE, EXTENDED RELEASE ORAL at 20:19

## 2017-01-09 RX ADMIN — HEPARIN SODIUM 5000 UNITS: 5000 INJECTION, SOLUTION INTRAVENOUS; SUBCUTANEOUS at 13:00

## 2017-01-09 RX ADMIN — LORATADINE 10 MG: 10 TABLET ORAL at 08:41

## 2017-01-09 RX ADMIN — INSULIN LISPRO 2 UNITS: 100 INJECTION, SOLUTION INTRAVENOUS; SUBCUTANEOUS at 17:24

## 2017-01-09 RX ADMIN — DOCUSATE SODIUM 100 MG: 100 CAPSULE, LIQUID FILLED ORAL at 08:41

## 2017-01-09 RX ADMIN — OLANZAPINE 20 MG: 10 TABLET, FILM COATED ORAL at 21:38

## 2017-01-09 RX ADMIN — CARBIDOPA AND LEVODOPA 1 TABLET: 50; 200 TABLET, EXTENDED RELEASE ORAL at 16:05

## 2017-01-09 RX ADMIN — ASPIRIN AND DIPYRIDAMOLE 1 CAPSULE: 25; 200 CAPSULE, EXTENDED RELEASE ORAL at 08:41

## 2017-01-09 RX ADMIN — GUAIFENESIN 600 MG: 600 TABLET, EXTENDED RELEASE ORAL at 08:41

## 2017-01-09 RX ADMIN — PRAVASTATIN SODIUM 80 MG: 80 TABLET ORAL at 16:05

## 2017-01-09 RX ADMIN — HEPARIN SODIUM 5000 UNITS: 5000 INJECTION, SOLUTION INTRAVENOUS; SUBCUTANEOUS at 05:29

## 2017-01-09 RX ADMIN — INSULIN LISPRO 4 UNITS: 100 INJECTION, SOLUTION INTRAVENOUS; SUBCUTANEOUS at 08:43

## 2017-01-09 RX ADMIN — CARBIDOPA AND LEVODOPA 1 TABLET: 50; 200 TABLET, EXTENDED RELEASE ORAL at 20:19

## 2017-01-09 RX ADMIN — HEPARIN SODIUM 5000 UNITS: 5000 INJECTION, SOLUTION INTRAVENOUS; SUBCUTANEOUS at 21:35

## 2017-01-09 RX ADMIN — HYDRALAZINE HYDROCHLORIDE 10 MG: 20 INJECTION INTRAMUSCULAR; INTRAVENOUS at 00:00

## 2017-01-09 RX ADMIN — INSULIN GLARGINE 15 UNITS: 100 INJECTION, SOLUTION SUBCUTANEOUS at 21:36

## 2017-01-09 RX ADMIN — LISINOPRIL 20 MG: 20 TABLET ORAL at 08:41

## 2017-01-10 LAB
GLUCOSE SERPL-MCNC: 101 MG/DL (ref 65–140)
GLUCOSE SERPL-MCNC: 155 MG/DL (ref 65–140)
GLUCOSE SERPL-MCNC: 170 MG/DL (ref 65–140)
GLUCOSE SERPL-MCNC: 210 MG/DL (ref 65–140)

## 2017-01-10 PROCEDURE — 97166 OT EVAL MOD COMPLEX 45 MIN: CPT

## 2017-01-10 PROCEDURE — 82948 REAGENT STRIP/BLOOD GLUCOSE: CPT

## 2017-01-10 PROCEDURE — G8988 SELF CARE GOAL STATUS: HCPCS

## 2017-01-10 PROCEDURE — G8987 SELF CARE CURRENT STATUS: HCPCS

## 2017-01-10 PROCEDURE — 97530 THERAPEUTIC ACTIVITIES: CPT

## 2017-01-10 PROCEDURE — 94760 N-INVAS EAR/PLS OXIMETRY 1: CPT

## 2017-01-10 RX ADMIN — POTASSIUM CHLORIDE 20 MEQ: 1500 TABLET, EXTENDED RELEASE ORAL at 17:51

## 2017-01-10 RX ADMIN — CARBIDOPA AND LEVODOPA 1 TABLET: 50; 200 TABLET, EXTENDED RELEASE ORAL at 17:52

## 2017-01-10 RX ADMIN — HEPARIN SODIUM 5000 UNITS: 5000 INJECTION, SOLUTION INTRAVENOUS; SUBCUTANEOUS at 21:58

## 2017-01-10 RX ADMIN — PANTOPRAZOLE SODIUM 20 MG: 20 TABLET, DELAYED RELEASE ORAL at 05:16

## 2017-01-10 RX ADMIN — GUAIFENESIN 600 MG: 600 TABLET, EXTENDED RELEASE ORAL at 21:51

## 2017-01-10 RX ADMIN — ASPIRIN AND DIPYRIDAMOLE 1 CAPSULE: 25; 200 CAPSULE, EXTENDED RELEASE ORAL at 21:00

## 2017-01-10 RX ADMIN — ASPIRIN AND DIPYRIDAMOLE 1 CAPSULE: 25; 200 CAPSULE, EXTENDED RELEASE ORAL at 08:03

## 2017-01-10 RX ADMIN — POTASSIUM CHLORIDE 20 MEQ: 1500 TABLET, EXTENDED RELEASE ORAL at 11:32

## 2017-01-10 RX ADMIN — CARBIDOPA AND LEVODOPA 1 TABLET: 50; 200 TABLET, EXTENDED RELEASE ORAL at 21:51

## 2017-01-10 RX ADMIN — DOCUSATE SODIUM 100 MG: 100 CAPSULE, LIQUID FILLED ORAL at 08:02

## 2017-01-10 RX ADMIN — PRAVASTATIN SODIUM 80 MG: 80 TABLET ORAL at 17:51

## 2017-01-10 RX ADMIN — INSULIN GLARGINE 15 UNITS: 100 INJECTION, SOLUTION SUBCUTANEOUS at 22:14

## 2017-01-10 RX ADMIN — CARBIDOPA AND LEVODOPA 1 TABLET: 50; 200 TABLET, EXTENDED RELEASE ORAL at 08:03

## 2017-01-10 RX ADMIN — POTASSIUM CHLORIDE 20 MEQ: 1500 TABLET, EXTENDED RELEASE ORAL at 08:02

## 2017-01-10 RX ADMIN — LISINOPRIL 20 MG: 20 TABLET ORAL at 08:02

## 2017-01-10 RX ADMIN — OLANZAPINE 20 MG: 10 TABLET, FILM COATED ORAL at 22:02

## 2017-01-10 RX ADMIN — AZITHROMYCIN MONOHYDRATE 500 MG: 500 INJECTION, POWDER, LYOPHILIZED, FOR SOLUTION INTRAVENOUS at 23:41

## 2017-01-10 RX ADMIN — HEPARIN SODIUM 5000 UNITS: 5000 INJECTION, SOLUTION INTRAVENOUS; SUBCUTANEOUS at 13:23

## 2017-01-10 RX ADMIN — HYDRALAZINE HYDROCHLORIDE 10 MG: 20 INJECTION INTRAMUSCULAR; INTRAVENOUS at 08:03

## 2017-01-10 RX ADMIN — AZITHROMYCIN MONOHYDRATE 500 MG: 500 INJECTION, POWDER, LYOPHILIZED, FOR SOLUTION INTRAVENOUS at 00:35

## 2017-01-10 RX ADMIN — DOCUSATE SODIUM 100 MG: 100 CAPSULE, LIQUID FILLED ORAL at 17:51

## 2017-01-10 RX ADMIN — LORATADINE 10 MG: 10 TABLET ORAL at 08:02

## 2017-01-10 RX ADMIN — CITALOPRAM HYDROBROMIDE 20 MG: 20 TABLET ORAL at 08:02

## 2017-01-10 RX ADMIN — INSULIN LISPRO 4 UNITS: 100 INJECTION, SOLUTION INTRAVENOUS; SUBCUTANEOUS at 11:26

## 2017-01-10 RX ADMIN — HEPARIN SODIUM 5000 UNITS: 5000 INJECTION, SOLUTION INTRAVENOUS; SUBCUTANEOUS at 05:16

## 2017-01-10 RX ADMIN — GUAIFENESIN 600 MG: 600 TABLET, EXTENDED RELEASE ORAL at 08:02

## 2017-01-10 RX ADMIN — INSULIN LISPRO 2 UNITS: 100 INJECTION, SOLUTION INTRAVENOUS; SUBCUTANEOUS at 17:51

## 2017-01-11 LAB
GLUCOSE SERPL-MCNC: 147 MG/DL (ref 65–140)
GLUCOSE SERPL-MCNC: 201 MG/DL (ref 65–140)
GLUCOSE SERPL-MCNC: 209 MG/DL (ref 65–140)
GLUCOSE SERPL-MCNC: 250 MG/DL (ref 65–140)

## 2017-01-11 PROCEDURE — 94760 N-INVAS EAR/PLS OXIMETRY 1: CPT

## 2017-01-11 PROCEDURE — 82948 REAGENT STRIP/BLOOD GLUCOSE: CPT

## 2017-01-11 RX ORDER — INSULIN GLARGINE 100 [IU]/ML
20 INJECTION, SOLUTION SUBCUTANEOUS
Status: DISCONTINUED | OUTPATIENT
Start: 2017-01-11 | End: 2017-01-13

## 2017-01-11 RX ADMIN — ACETAMINOPHEN 650 MG: 325 TABLET, FILM COATED ORAL at 13:17

## 2017-01-11 RX ADMIN — GUAIFENESIN 600 MG: 600 TABLET, EXTENDED RELEASE ORAL at 21:00

## 2017-01-11 RX ADMIN — ASPIRIN AND DIPYRIDAMOLE 1 CAPSULE: 25; 200 CAPSULE, EXTENDED RELEASE ORAL at 08:44

## 2017-01-11 RX ADMIN — POTASSIUM CHLORIDE 20 MEQ: 1500 TABLET, EXTENDED RELEASE ORAL at 12:08

## 2017-01-11 RX ADMIN — HEPARIN SODIUM 5000 UNITS: 5000 INJECTION, SOLUTION INTRAVENOUS; SUBCUTANEOUS at 06:22

## 2017-01-11 RX ADMIN — INSULIN LISPRO 4 UNITS: 100 INJECTION, SOLUTION INTRAVENOUS; SUBCUTANEOUS at 08:45

## 2017-01-11 RX ADMIN — INSULIN LISPRO 6 UNITS: 100 INJECTION, SOLUTION INTRAVENOUS; SUBCUTANEOUS at 12:08

## 2017-01-11 RX ADMIN — HEPARIN SODIUM 5000 UNITS: 5000 INJECTION, SOLUTION INTRAVENOUS; SUBCUTANEOUS at 22:02

## 2017-01-11 RX ADMIN — PANTOPRAZOLE SODIUM 20 MG: 20 TABLET, DELAYED RELEASE ORAL at 06:25

## 2017-01-11 RX ADMIN — LISINOPRIL 20 MG: 20 TABLET ORAL at 08:44

## 2017-01-11 RX ADMIN — POTASSIUM CHLORIDE 20 MEQ: 1500 TABLET, EXTENDED RELEASE ORAL at 08:44

## 2017-01-11 RX ADMIN — CITALOPRAM HYDROBROMIDE 20 MG: 20 TABLET ORAL at 08:44

## 2017-01-11 RX ADMIN — OLANZAPINE 20 MG: 10 TABLET, FILM COATED ORAL at 22:07

## 2017-01-11 RX ADMIN — POTASSIUM CHLORIDE 20 MEQ: 1500 TABLET, EXTENDED RELEASE ORAL at 15:57

## 2017-01-11 RX ADMIN — INSULIN GLARGINE 20 UNITS: 100 INJECTION, SOLUTION SUBCUTANEOUS at 22:03

## 2017-01-11 RX ADMIN — ASPIRIN AND DIPYRIDAMOLE 1 CAPSULE: 25; 200 CAPSULE, EXTENDED RELEASE ORAL at 21:00

## 2017-01-11 RX ADMIN — CARBIDOPA AND LEVODOPA 1 TABLET: 50; 200 TABLET, EXTENDED RELEASE ORAL at 21:00

## 2017-01-11 RX ADMIN — GUAIFENESIN 600 MG: 600 TABLET, EXTENDED RELEASE ORAL at 08:44

## 2017-01-11 RX ADMIN — DOCUSATE SODIUM 100 MG: 100 CAPSULE, LIQUID FILLED ORAL at 08:44

## 2017-01-11 RX ADMIN — HEPARIN SODIUM 5000 UNITS: 5000 INJECTION, SOLUTION INTRAVENOUS; SUBCUTANEOUS at 13:17

## 2017-01-11 RX ADMIN — DOCUSATE SODIUM 100 MG: 100 CAPSULE, LIQUID FILLED ORAL at 17:11

## 2017-01-11 RX ADMIN — CARBIDOPA AND LEVODOPA 1 TABLET: 50; 200 TABLET, EXTENDED RELEASE ORAL at 08:44

## 2017-01-11 RX ADMIN — CARBIDOPA AND LEVODOPA 1 TABLET: 50; 200 TABLET, EXTENDED RELEASE ORAL at 15:57

## 2017-01-11 RX ADMIN — LORATADINE 10 MG: 10 TABLET ORAL at 08:44

## 2017-01-11 RX ADMIN — PRAVASTATIN SODIUM 80 MG: 80 TABLET ORAL at 15:57

## 2017-01-12 LAB
ANION GAP SERPL CALCULATED.3IONS-SCNC: 9 MMOL/L (ref 4–13)
BUN SERPL-MCNC: 11 MG/DL (ref 5–25)
CALCIUM SERPL-MCNC: 9.1 MG/DL (ref 8.3–10.1)
CHLORIDE SERPL-SCNC: 108 MMOL/L (ref 100–108)
CO2 SERPL-SCNC: 27 MMOL/L (ref 21–32)
CREAT SERPL-MCNC: 0.89 MG/DL (ref 0.6–1.3)
GFR SERPL CREATININE-BSD FRML MDRD: >60 ML/MIN/1.73SQ M
GLUCOSE SERPL-MCNC: 114 MG/DL (ref 65–140)
GLUCOSE SERPL-MCNC: 203 MG/DL (ref 65–140)
GLUCOSE SERPL-MCNC: 205 MG/DL (ref 65–140)
GLUCOSE SERPL-MCNC: 83 MG/DL (ref 65–140)
GLUCOSE SERPL-MCNC: 95 MG/DL (ref 65–140)
POTASSIUM SERPL-SCNC: 4.5 MMOL/L (ref 3.5–5.3)
SODIUM SERPL-SCNC: 144 MMOL/L (ref 136–145)

## 2017-01-12 PROCEDURE — 82948 REAGENT STRIP/BLOOD GLUCOSE: CPT

## 2017-01-12 PROCEDURE — 97535 SELF CARE MNGMENT TRAINING: CPT

## 2017-01-12 PROCEDURE — 94760 N-INVAS EAR/PLS OXIMETRY 1: CPT

## 2017-01-12 PROCEDURE — 97532 HB COGNITIVE SKILLS DEVELOPMENT: CPT

## 2017-01-12 PROCEDURE — 80048 BASIC METABOLIC PNL TOTAL CA: CPT | Performed by: HOSPITALIST

## 2017-01-12 PROCEDURE — 97530 THERAPEUTIC ACTIVITIES: CPT

## 2017-01-12 PROCEDURE — 97110 THERAPEUTIC EXERCISES: CPT

## 2017-01-12 PROCEDURE — 97112 NEUROMUSCULAR REEDUCATION: CPT

## 2017-01-12 RX ORDER — TAMSULOSIN HYDROCHLORIDE 0.4 MG/1
0.4 CAPSULE ORAL
Status: DISCONTINUED | OUTPATIENT
Start: 2017-01-12 | End: 2017-01-17 | Stop reason: HOSPADM

## 2017-01-12 RX ORDER — INSULIN GLARGINE 100 [IU]/ML
10 INJECTION, SOLUTION SUBCUTANEOUS ONCE
Status: COMPLETED | OUTPATIENT
Start: 2017-01-12 | End: 2017-01-12

## 2017-01-12 RX ADMIN — GUAIFENESIN 600 MG: 600 TABLET, EXTENDED RELEASE ORAL at 22:10

## 2017-01-12 RX ADMIN — POTASSIUM CHLORIDE 20 MEQ: 1500 TABLET, EXTENDED RELEASE ORAL at 09:00

## 2017-01-12 RX ADMIN — TAMSULOSIN HYDROCHLORIDE 0.4 MG: 0.4 CAPSULE ORAL at 17:18

## 2017-01-12 RX ADMIN — LORATADINE 10 MG: 10 TABLET ORAL at 09:00

## 2017-01-12 RX ADMIN — CARBIDOPA AND LEVODOPA 1 TABLET: 50; 200 TABLET, EXTENDED RELEASE ORAL at 22:10

## 2017-01-12 RX ADMIN — PRAVASTATIN SODIUM 80 MG: 80 TABLET ORAL at 17:18

## 2017-01-12 RX ADMIN — INSULIN LISPRO 4 UNITS: 100 INJECTION, SOLUTION INTRAVENOUS; SUBCUTANEOUS at 17:18

## 2017-01-12 RX ADMIN — GUAIFENESIN 600 MG: 600 TABLET, EXTENDED RELEASE ORAL at 09:00

## 2017-01-12 RX ADMIN — OLANZAPINE 20 MG: 10 TABLET, FILM COATED ORAL at 22:09

## 2017-01-12 RX ADMIN — POTASSIUM CHLORIDE 20 MEQ: 1500 TABLET, EXTENDED RELEASE ORAL at 11:37

## 2017-01-12 RX ADMIN — CARBIDOPA AND LEVODOPA 1 TABLET: 50; 200 TABLET, EXTENDED RELEASE ORAL at 17:18

## 2017-01-12 RX ADMIN — ASPIRIN AND DIPYRIDAMOLE 1 CAPSULE: 25; 200 CAPSULE, EXTENDED RELEASE ORAL at 22:10

## 2017-01-12 RX ADMIN — POTASSIUM CHLORIDE 20 MEQ: 1500 TABLET, EXTENDED RELEASE ORAL at 17:18

## 2017-01-12 RX ADMIN — CARBIDOPA AND LEVODOPA 1 TABLET: 50; 200 TABLET, EXTENDED RELEASE ORAL at 08:59

## 2017-01-12 RX ADMIN — HEPARIN SODIUM 5000 UNITS: 5000 INJECTION, SOLUTION INTRAVENOUS; SUBCUTANEOUS at 05:39

## 2017-01-12 RX ADMIN — ASPIRIN AND DIPYRIDAMOLE 1 CAPSULE: 25; 200 CAPSULE, EXTENDED RELEASE ORAL at 08:59

## 2017-01-12 RX ADMIN — CITALOPRAM HYDROBROMIDE 20 MG: 20 TABLET ORAL at 08:59

## 2017-01-12 RX ADMIN — HEPARIN SODIUM 5000 UNITS: 5000 INJECTION, SOLUTION INTRAVENOUS; SUBCUTANEOUS at 13:04

## 2017-01-12 RX ADMIN — DOCUSATE SODIUM 100 MG: 100 CAPSULE, LIQUID FILLED ORAL at 08:59

## 2017-01-12 RX ADMIN — INSULIN GLARGINE 10 UNITS: 100 INJECTION, SOLUTION SUBCUTANEOUS at 22:17

## 2017-01-12 RX ADMIN — DOCUSATE SODIUM 100 MG: 100 CAPSULE, LIQUID FILLED ORAL at 17:18

## 2017-01-12 RX ADMIN — INSULIN LISPRO 4 UNITS: 100 INJECTION, SOLUTION INTRAVENOUS; SUBCUTANEOUS at 11:37

## 2017-01-12 RX ADMIN — LISINOPRIL 20 MG: 20 TABLET ORAL at 09:00

## 2017-01-12 RX ADMIN — PANTOPRAZOLE SODIUM 20 MG: 20 TABLET, DELAYED RELEASE ORAL at 05:40

## 2017-01-12 RX ADMIN — HEPARIN SODIUM 5000 UNITS: 5000 INJECTION, SOLUTION INTRAVENOUS; SUBCUTANEOUS at 22:10

## 2017-01-13 LAB
GLUCOSE SERPL-MCNC: 117 MG/DL (ref 65–140)
GLUCOSE SERPL-MCNC: 144 MG/DL (ref 65–140)
GLUCOSE SERPL-MCNC: 255 MG/DL (ref 65–140)
GLUCOSE SERPL-MCNC: 265 MG/DL (ref 65–140)

## 2017-01-13 PROCEDURE — 0H9NXZZ DRAINAGE OF LEFT FOOT SKIN, EXTERNAL APPROACH: ICD-10-PCS | Performed by: PODIATRIST

## 2017-01-13 PROCEDURE — 0HBRXZZ EXCISION OF TOE NAIL, EXTERNAL APPROACH: ICD-10-PCS | Performed by: PODIATRIST

## 2017-01-13 PROCEDURE — 82948 REAGENT STRIP/BLOOD GLUCOSE: CPT

## 2017-01-13 PROCEDURE — 0H9MXZZ DRAINAGE OF RIGHT FOOT SKIN, EXTERNAL APPROACH: ICD-10-PCS | Performed by: PODIATRIST

## 2017-01-13 RX ORDER — INSULIN GLARGINE 100 [IU]/ML
15 INJECTION, SOLUTION SUBCUTANEOUS
Status: DISCONTINUED | OUTPATIENT
Start: 2017-01-13 | End: 2017-01-14

## 2017-01-13 RX ADMIN — INSULIN LISPRO 6 UNITS: 100 INJECTION, SOLUTION INTRAVENOUS; SUBCUTANEOUS at 12:16

## 2017-01-13 RX ADMIN — ASPIRIN AND DIPYRIDAMOLE 1 CAPSULE: 25; 200 CAPSULE, EXTENDED RELEASE ORAL at 22:36

## 2017-01-13 RX ADMIN — CARBIDOPA AND LEVODOPA 1 TABLET: 50; 200 TABLET, EXTENDED RELEASE ORAL at 22:36

## 2017-01-13 RX ADMIN — HEPARIN SODIUM 5000 UNITS: 5000 INJECTION, SOLUTION INTRAVENOUS; SUBCUTANEOUS at 06:15

## 2017-01-13 RX ADMIN — CITALOPRAM HYDROBROMIDE 20 MG: 20 TABLET ORAL at 09:00

## 2017-01-13 RX ADMIN — CARBIDOPA AND LEVODOPA 1 TABLET: 50; 200 TABLET, EXTENDED RELEASE ORAL at 17:00

## 2017-01-13 RX ADMIN — GUAIFENESIN 600 MG: 600 TABLET, EXTENDED RELEASE ORAL at 22:36

## 2017-01-13 RX ADMIN — DOCUSATE SODIUM 100 MG: 100 CAPSULE, LIQUID FILLED ORAL at 09:00

## 2017-01-13 RX ADMIN — HEPARIN SODIUM 5000 UNITS: 5000 INJECTION, SOLUTION INTRAVENOUS; SUBCUTANEOUS at 14:21

## 2017-01-13 RX ADMIN — TAMSULOSIN HYDROCHLORIDE 0.4 MG: 0.4 CAPSULE ORAL at 18:20

## 2017-01-13 RX ADMIN — POTASSIUM CHLORIDE 20 MEQ: 1500 TABLET, EXTENDED RELEASE ORAL at 09:01

## 2017-01-13 RX ADMIN — GUAIFENESIN 600 MG: 600 TABLET, EXTENDED RELEASE ORAL at 09:00

## 2017-01-13 RX ADMIN — LORATADINE 10 MG: 10 TABLET ORAL at 09:00

## 2017-01-13 RX ADMIN — POTASSIUM CHLORIDE 20 MEQ: 1500 TABLET, EXTENDED RELEASE ORAL at 18:20

## 2017-01-13 RX ADMIN — OLANZAPINE 20 MG: 10 TABLET, FILM COATED ORAL at 22:35

## 2017-01-13 RX ADMIN — LISINOPRIL 20 MG: 20 TABLET ORAL at 09:00

## 2017-01-13 RX ADMIN — DOCUSATE SODIUM 100 MG: 100 CAPSULE, LIQUID FILLED ORAL at 18:20

## 2017-01-13 RX ADMIN — HEPARIN SODIUM 5000 UNITS: 5000 INJECTION, SOLUTION INTRAVENOUS; SUBCUTANEOUS at 22:34

## 2017-01-13 RX ADMIN — PANTOPRAZOLE SODIUM 20 MG: 20 TABLET, DELAYED RELEASE ORAL at 06:16

## 2017-01-13 RX ADMIN — PRAVASTATIN SODIUM 80 MG: 80 TABLET ORAL at 18:19

## 2017-01-13 RX ADMIN — ASPIRIN AND DIPYRIDAMOLE 1 CAPSULE: 25; 200 CAPSULE, EXTENDED RELEASE ORAL at 09:02

## 2017-01-13 RX ADMIN — POTASSIUM CHLORIDE 20 MEQ: 1500 TABLET, EXTENDED RELEASE ORAL at 14:21

## 2017-01-13 RX ADMIN — CARBIDOPA AND LEVODOPA 1 TABLET: 50; 200 TABLET, EXTENDED RELEASE ORAL at 09:02

## 2017-01-13 RX ADMIN — INSULIN GLARGINE 15 UNITS: 100 INJECTION, SOLUTION SUBCUTANEOUS at 22:34

## 2017-01-14 LAB
GLUCOSE SERPL-MCNC: 191 MG/DL (ref 65–140)
GLUCOSE SERPL-MCNC: 208 MG/DL (ref 65–140)
GLUCOSE SERPL-MCNC: 227 MG/DL (ref 65–140)
GLUCOSE SERPL-MCNC: 259 MG/DL (ref 65–140)

## 2017-01-14 PROCEDURE — 82948 REAGENT STRIP/BLOOD GLUCOSE: CPT

## 2017-01-14 RX ORDER — INSULIN GLARGINE 100 [IU]/ML
15 INJECTION, SOLUTION SUBCUTANEOUS
Status: DISCONTINUED | OUTPATIENT
Start: 2017-01-14 | End: 2017-01-16

## 2017-01-14 RX ORDER — INSULIN GLARGINE 100 [IU]/ML
18 INJECTION, SOLUTION SUBCUTANEOUS
Status: DISCONTINUED | OUTPATIENT
Start: 2017-01-14 | End: 2017-01-14

## 2017-01-14 RX ORDER — ONDANSETRON 2 MG/ML
4 INJECTION INTRAMUSCULAR; INTRAVENOUS EVERY 6 HOURS PRN
Status: DISCONTINUED | OUTPATIENT
Start: 2017-01-14 | End: 2017-01-17 | Stop reason: HOSPADM

## 2017-01-14 RX ADMIN — DOCUSATE SODIUM 100 MG: 100 CAPSULE, LIQUID FILLED ORAL at 09:33

## 2017-01-14 RX ADMIN — CITALOPRAM HYDROBROMIDE 20 MG: 20 TABLET ORAL at 09:33

## 2017-01-14 RX ADMIN — CARBIDOPA AND LEVODOPA 1 TABLET: 50; 200 TABLET, EXTENDED RELEASE ORAL at 16:54

## 2017-01-14 RX ADMIN — GUAIFENESIN 600 MG: 600 TABLET, EXTENDED RELEASE ORAL at 09:33

## 2017-01-14 RX ADMIN — GUAIFENESIN 600 MG: 600 TABLET, EXTENDED RELEASE ORAL at 21:18

## 2017-01-14 RX ADMIN — LISINOPRIL 20 MG: 20 TABLET ORAL at 09:33

## 2017-01-14 RX ADMIN — HEPARIN SODIUM 5000 UNITS: 5000 INJECTION, SOLUTION INTRAVENOUS; SUBCUTANEOUS at 13:34

## 2017-01-14 RX ADMIN — ASPIRIN AND DIPYRIDAMOLE 1 CAPSULE: 25; 200 CAPSULE, EXTENDED RELEASE ORAL at 09:30

## 2017-01-14 RX ADMIN — INSULIN LISPRO 2 UNITS: 100 INJECTION, SOLUTION INTRAVENOUS; SUBCUTANEOUS at 09:33

## 2017-01-14 RX ADMIN — HEPARIN SODIUM 5000 UNITS: 5000 INJECTION, SOLUTION INTRAVENOUS; SUBCUTANEOUS at 06:40

## 2017-01-14 RX ADMIN — HEPARIN SODIUM 5000 UNITS: 5000 INJECTION, SOLUTION INTRAVENOUS; SUBCUTANEOUS at 21:18

## 2017-01-14 RX ADMIN — LORATADINE 10 MG: 10 TABLET ORAL at 09:33

## 2017-01-14 RX ADMIN — INSULIN LISPRO 6 UNITS: 100 INJECTION, SOLUTION INTRAVENOUS; SUBCUTANEOUS at 12:59

## 2017-01-14 RX ADMIN — DOCUSATE SODIUM 100 MG: 100 CAPSULE, LIQUID FILLED ORAL at 17:15

## 2017-01-14 RX ADMIN — ASPIRIN AND DIPYRIDAMOLE 1 CAPSULE: 25; 200 CAPSULE, EXTENDED RELEASE ORAL at 21:19

## 2017-01-14 RX ADMIN — PRAVASTATIN SODIUM 80 MG: 80 TABLET ORAL at 17:14

## 2017-01-14 RX ADMIN — OLANZAPINE 20 MG: 10 TABLET, FILM COATED ORAL at 21:18

## 2017-01-14 RX ADMIN — CARBIDOPA AND LEVODOPA 1 TABLET: 50; 200 TABLET, EXTENDED RELEASE ORAL at 09:29

## 2017-01-14 RX ADMIN — TAMSULOSIN HYDROCHLORIDE 0.4 MG: 0.4 CAPSULE ORAL at 19:15

## 2017-01-14 RX ADMIN — CARBIDOPA AND LEVODOPA 1 TABLET: 50; 200 TABLET, EXTENDED RELEASE ORAL at 21:19

## 2017-01-14 RX ADMIN — INSULIN LISPRO 4 UNITS: 100 INJECTION, SOLUTION INTRAVENOUS; SUBCUTANEOUS at 16:55

## 2017-01-14 RX ADMIN — INSULIN GLARGINE 15 UNITS: 100 INJECTION, SOLUTION SUBCUTANEOUS at 21:19

## 2017-01-14 RX ADMIN — POTASSIUM CHLORIDE 20 MEQ: 1500 TABLET, EXTENDED RELEASE ORAL at 06:48

## 2017-01-14 RX ADMIN — PANTOPRAZOLE SODIUM 20 MG: 20 TABLET, DELAYED RELEASE ORAL at 06:40

## 2017-01-14 RX ADMIN — ONDANSETRON 4 MG: 2 INJECTION INTRAMUSCULAR; INTRAVENOUS at 13:10

## 2017-01-15 LAB
GLUCOSE SERPL-MCNC: 127 MG/DL (ref 65–140)
GLUCOSE SERPL-MCNC: 214 MG/DL (ref 65–140)
GLUCOSE SERPL-MCNC: 254 MG/DL (ref 65–140)
GLUCOSE SERPL-MCNC: 260 MG/DL (ref 65–140)

## 2017-01-15 PROCEDURE — 82948 REAGENT STRIP/BLOOD GLUCOSE: CPT

## 2017-01-15 RX ADMIN — INSULIN GLARGINE 15 UNITS: 100 INJECTION, SOLUTION SUBCUTANEOUS at 21:52

## 2017-01-15 RX ADMIN — LORATADINE 10 MG: 10 TABLET ORAL at 09:47

## 2017-01-15 RX ADMIN — LISINOPRIL 20 MG: 20 TABLET ORAL at 09:47

## 2017-01-15 RX ADMIN — PRAVASTATIN SODIUM 80 MG: 80 TABLET ORAL at 17:31

## 2017-01-15 RX ADMIN — INSULIN LISPRO 6 UNITS: 100 INJECTION, SOLUTION INTRAVENOUS; SUBCUTANEOUS at 12:13

## 2017-01-15 RX ADMIN — PANTOPRAZOLE SODIUM 20 MG: 20 TABLET, DELAYED RELEASE ORAL at 05:01

## 2017-01-15 RX ADMIN — CARBIDOPA AND LEVODOPA 1 TABLET: 50; 200 TABLET, EXTENDED RELEASE ORAL at 21:51

## 2017-01-15 RX ADMIN — CARBIDOPA AND LEVODOPA 1 TABLET: 50; 200 TABLET, EXTENDED RELEASE ORAL at 17:31

## 2017-01-15 RX ADMIN — TAMSULOSIN HYDROCHLORIDE 0.4 MG: 0.4 CAPSULE ORAL at 17:31

## 2017-01-15 RX ADMIN — HEPARIN SODIUM 5000 UNITS: 5000 INJECTION, SOLUTION INTRAVENOUS; SUBCUTANEOUS at 14:36

## 2017-01-15 RX ADMIN — HEPARIN SODIUM 5000 UNITS: 5000 INJECTION, SOLUTION INTRAVENOUS; SUBCUTANEOUS at 05:00

## 2017-01-15 RX ADMIN — GUAIFENESIN 600 MG: 600 TABLET, EXTENDED RELEASE ORAL at 21:51

## 2017-01-15 RX ADMIN — OLANZAPINE 20 MG: 10 TABLET, FILM COATED ORAL at 21:51

## 2017-01-15 RX ADMIN — GUAIFENESIN 600 MG: 600 TABLET, EXTENDED RELEASE ORAL at 09:47

## 2017-01-15 RX ADMIN — CITALOPRAM HYDROBROMIDE 20 MG: 20 TABLET ORAL at 09:47

## 2017-01-15 RX ADMIN — DOCUSATE SODIUM 100 MG: 100 CAPSULE, LIQUID FILLED ORAL at 09:47

## 2017-01-15 RX ADMIN — ASPIRIN AND DIPYRIDAMOLE 1 CAPSULE: 25; 200 CAPSULE, EXTENDED RELEASE ORAL at 09:47

## 2017-01-15 RX ADMIN — CARBIDOPA AND LEVODOPA 1 TABLET: 50; 200 TABLET, EXTENDED RELEASE ORAL at 09:48

## 2017-01-15 RX ADMIN — HEPARIN SODIUM 5000 UNITS: 5000 INJECTION, SOLUTION INTRAVENOUS; SUBCUTANEOUS at 21:52

## 2017-01-15 RX ADMIN — ASPIRIN AND DIPYRIDAMOLE 1 CAPSULE: 25; 200 CAPSULE, EXTENDED RELEASE ORAL at 21:51

## 2017-01-15 RX ADMIN — INSULIN LISPRO 4 UNITS: 100 INJECTION, SOLUTION INTRAVENOUS; SUBCUTANEOUS at 17:30

## 2017-01-15 RX ADMIN — DOCUSATE SODIUM 100 MG: 100 CAPSULE, LIQUID FILLED ORAL at 17:31

## 2017-01-16 PROBLEM — J20.9 ACUTE TRACHEOBRONCHITIS: Status: ACTIVE | Noted: 2017-01-06

## 2017-01-16 PROBLEM — S90.822A BLISTER OF LEFT FOOT: Status: ACTIVE | Noted: 2017-01-16

## 2017-01-16 PROBLEM — IMO0001 DIABETES MELLITUS, INSULIN DEPENDENT (IDDM), UNCONTROLLED: Status: ACTIVE | Noted: 2017-01-16

## 2017-01-16 PROBLEM — R33.8 ACUTE URINARY RETENTION: Status: ACTIVE | Noted: 2017-01-16

## 2017-01-16 LAB
GLUCOSE SERPL-MCNC: 168 MG/DL (ref 65–140)
GLUCOSE SERPL-MCNC: 177 MG/DL (ref 65–140)
GLUCOSE SERPL-MCNC: 201 MG/DL (ref 65–140)
GLUCOSE SERPL-MCNC: 262 MG/DL (ref 65–140)

## 2017-01-16 PROCEDURE — 97530 THERAPEUTIC ACTIVITIES: CPT

## 2017-01-16 PROCEDURE — 97112 NEUROMUSCULAR REEDUCATION: CPT

## 2017-01-16 PROCEDURE — 82948 REAGENT STRIP/BLOOD GLUCOSE: CPT

## 2017-01-16 RX ORDER — INSULIN GLARGINE 100 [IU]/ML
20 INJECTION, SOLUTION SUBCUTANEOUS
Status: DISCONTINUED | OUTPATIENT
Start: 2017-01-16 | End: 2017-01-17 | Stop reason: HOSPADM

## 2017-01-16 RX ADMIN — PRAVASTATIN SODIUM 80 MG: 80 TABLET ORAL at 15:57

## 2017-01-16 RX ADMIN — ASPIRIN AND DIPYRIDAMOLE 1 CAPSULE: 25; 200 CAPSULE, EXTENDED RELEASE ORAL at 08:37

## 2017-01-16 RX ADMIN — INSULIN LISPRO 2 UNITS: 100 INJECTION, SOLUTION INTRAVENOUS; SUBCUTANEOUS at 08:39

## 2017-01-16 RX ADMIN — DOCUSATE SODIUM 100 MG: 100 CAPSULE, LIQUID FILLED ORAL at 08:38

## 2017-01-16 RX ADMIN — CITALOPRAM HYDROBROMIDE 20 MG: 20 TABLET ORAL at 08:38

## 2017-01-16 RX ADMIN — DOCUSATE SODIUM 100 MG: 100 CAPSULE, LIQUID FILLED ORAL at 17:01

## 2017-01-16 RX ADMIN — INSULIN LISPRO 2 UNITS: 100 INJECTION, SOLUTION INTRAVENOUS; SUBCUTANEOUS at 15:57

## 2017-01-16 RX ADMIN — CARBIDOPA AND LEVODOPA 1 TABLET: 50; 200 TABLET, EXTENDED RELEASE ORAL at 15:57

## 2017-01-16 RX ADMIN — PANTOPRAZOLE SODIUM 20 MG: 20 TABLET, DELAYED RELEASE ORAL at 05:10

## 2017-01-16 RX ADMIN — LISINOPRIL 20 MG: 20 TABLET ORAL at 08:38

## 2017-01-16 RX ADMIN — TAMSULOSIN HYDROCHLORIDE 0.4 MG: 0.4 CAPSULE ORAL at 15:57

## 2017-01-16 RX ADMIN — CARBIDOPA AND LEVODOPA 1 TABLET: 50; 200 TABLET, EXTENDED RELEASE ORAL at 21:23

## 2017-01-16 RX ADMIN — ASPIRIN AND DIPYRIDAMOLE 1 CAPSULE: 25; 200 CAPSULE, EXTENDED RELEASE ORAL at 21:23

## 2017-01-16 RX ADMIN — OLANZAPINE 20 MG: 10 TABLET, FILM COATED ORAL at 21:23

## 2017-01-16 RX ADMIN — HEPARIN SODIUM 5000 UNITS: 5000 INJECTION, SOLUTION INTRAVENOUS; SUBCUTANEOUS at 21:24

## 2017-01-16 RX ADMIN — INSULIN LISPRO 4 UNITS: 100 INJECTION, SOLUTION INTRAVENOUS; SUBCUTANEOUS at 11:13

## 2017-01-16 RX ADMIN — LORATADINE 10 MG: 10 TABLET ORAL at 08:38

## 2017-01-16 RX ADMIN — GUAIFENESIN 600 MG: 600 TABLET, EXTENDED RELEASE ORAL at 08:38

## 2017-01-16 RX ADMIN — CARBIDOPA AND LEVODOPA 1 TABLET: 50; 200 TABLET, EXTENDED RELEASE ORAL at 08:38

## 2017-01-16 RX ADMIN — INSULIN GLARGINE 20 UNITS: 100 INJECTION, SOLUTION SUBCUTANEOUS at 21:23

## 2017-01-16 RX ADMIN — HEPARIN SODIUM 5000 UNITS: 5000 INJECTION, SOLUTION INTRAVENOUS; SUBCUTANEOUS at 05:10

## 2017-01-16 RX ADMIN — ONDANSETRON 4 MG: 2 INJECTION INTRAMUSCULAR; INTRAVENOUS at 17:01

## 2017-01-16 RX ADMIN — HEPARIN SODIUM 5000 UNITS: 5000 INJECTION, SOLUTION INTRAVENOUS; SUBCUTANEOUS at 13:25

## 2017-01-17 VITALS
OXYGEN SATURATION: 96 % | DIASTOLIC BLOOD PRESSURE: 68 MMHG | SYSTOLIC BLOOD PRESSURE: 129 MMHG | TEMPERATURE: 97.2 F | HEIGHT: 60 IN | BODY MASS INDEX: 24.19 KG/M2 | HEART RATE: 83 BPM | RESPIRATION RATE: 18 BRPM | WEIGHT: 123.24 LBS

## 2017-01-17 LAB
GLUCOSE SERPL-MCNC: 130 MG/DL (ref 65–140)
GLUCOSE SERPL-MCNC: 173 MG/DL (ref 65–140)
GLUCOSE SERPL-MCNC: 276 MG/DL (ref 65–140)

## 2017-01-17 PROCEDURE — 97535 SELF CARE MNGMENT TRAINING: CPT

## 2017-01-17 PROCEDURE — 97530 THERAPEUTIC ACTIVITIES: CPT

## 2017-01-17 PROCEDURE — 97116 GAIT TRAINING THERAPY: CPT

## 2017-01-17 PROCEDURE — 82948 REAGENT STRIP/BLOOD GLUCOSE: CPT

## 2017-01-17 PROCEDURE — 97532 HB COGNITIVE SKILLS DEVELOPMENT: CPT

## 2017-01-17 RX ORDER — TAMSULOSIN HYDROCHLORIDE 0.4 MG/1
0.4 CAPSULE ORAL
Qty: 30 CAPSULE | Refills: 2 | Status: SHIPPED | OUTPATIENT
Start: 2017-01-17 | End: 2018-02-21 | Stop reason: SDUPTHER

## 2017-01-17 RX ORDER — INSULIN GLARGINE 100 [IU]/ML
20 INJECTION, SOLUTION SUBCUTANEOUS
Qty: 600 UNITS | Refills: 0 | Status: SHIPPED | OUTPATIENT
Start: 2017-01-17 | End: 2018-02-21 | Stop reason: SDUPTHER

## 2017-01-17 RX ORDER — ONDANSETRON 4 MG/1
4 TABLET, FILM COATED ORAL EVERY 12 HOURS PRN
Qty: 30 TABLET | Refills: 1 | Status: ON HOLD | OUTPATIENT
Start: 2017-01-17 | End: 2018-08-06

## 2017-01-17 RX ADMIN — PANTOPRAZOLE SODIUM 20 MG: 20 TABLET, DELAYED RELEASE ORAL at 05:27

## 2017-01-17 RX ADMIN — LORATADINE 10 MG: 10 TABLET ORAL at 09:23

## 2017-01-17 RX ADMIN — PRAVASTATIN SODIUM 80 MG: 80 TABLET ORAL at 15:56

## 2017-01-17 RX ADMIN — DOCUSATE SODIUM 100 MG: 100 CAPSULE, LIQUID FILLED ORAL at 17:06

## 2017-01-17 RX ADMIN — INSULIN LISPRO 6 UNITS: 100 INJECTION, SOLUTION INTRAVENOUS; SUBCUTANEOUS at 12:00

## 2017-01-17 RX ADMIN — CITALOPRAM HYDROBROMIDE 20 MG: 20 TABLET ORAL at 09:23

## 2017-01-17 RX ADMIN — CARBIDOPA AND LEVODOPA 1 TABLET: 50; 200 TABLET, EXTENDED RELEASE ORAL at 15:55

## 2017-01-17 RX ADMIN — LISINOPRIL 20 MG: 20 TABLET ORAL at 09:23

## 2017-01-17 RX ADMIN — ASPIRIN AND DIPYRIDAMOLE 1 CAPSULE: 25; 200 CAPSULE, EXTENDED RELEASE ORAL at 09:23

## 2017-01-17 RX ADMIN — CARBIDOPA AND LEVODOPA 1 TABLET: 50; 200 TABLET, EXTENDED RELEASE ORAL at 09:23

## 2017-01-17 RX ADMIN — DOCUSATE SODIUM 100 MG: 100 CAPSULE, LIQUID FILLED ORAL at 09:23

## 2017-01-17 RX ADMIN — INSULIN LISPRO 2 UNITS: 100 INJECTION, SOLUTION INTRAVENOUS; SUBCUTANEOUS at 15:56

## 2017-01-17 RX ADMIN — TAMSULOSIN HYDROCHLORIDE 0.4 MG: 0.4 CAPSULE ORAL at 15:56

## 2017-01-17 RX ADMIN — HEPARIN SODIUM 5000 UNITS: 5000 INJECTION, SOLUTION INTRAVENOUS; SUBCUTANEOUS at 05:27

## 2017-01-17 RX ADMIN — HEPARIN SODIUM 5000 UNITS: 5000 INJECTION, SOLUTION INTRAVENOUS; SUBCUTANEOUS at 13:08

## 2017-01-20 ENCOUNTER — ALLSCRIPTS OFFICE VISIT (OUTPATIENT)
Dept: OTHER | Facility: OTHER | Age: 82
End: 2017-01-20

## 2017-01-23 ENCOUNTER — ALLSCRIPTS OFFICE VISIT (OUTPATIENT)
Dept: WOUND CARE | Facility: HOSPITAL | Age: 82
End: 2017-01-23
Payer: COMMERCIAL

## 2017-01-23 PROCEDURE — 99213 OFFICE O/P EST LOW 20 MIN: CPT | Performed by: PODIATRIST

## 2017-01-23 PROCEDURE — 97597 DBRDMT OPN WND 1ST 20 CM/<: CPT | Performed by: PODIATRIST

## 2017-01-26 ENCOUNTER — ALLSCRIPTS OFFICE VISIT (OUTPATIENT)
Dept: OTHER | Facility: OTHER | Age: 82
End: 2017-01-26

## 2017-01-30 ENCOUNTER — ALLSCRIPTS OFFICE VISIT (OUTPATIENT)
Dept: WOUND CARE | Facility: HOSPITAL | Age: 82
End: 2017-01-30
Payer: COMMERCIAL

## 2017-01-30 PROCEDURE — 97597 DBRDMT OPN WND 1ST 20 CM/<: CPT | Performed by: PODIATRIST

## 2017-02-13 ENCOUNTER — ALLSCRIPTS OFFICE VISIT (OUTPATIENT)
Dept: WOUND CARE | Facility: HOSPITAL | Age: 82
End: 2017-02-13
Payer: COMMERCIAL

## 2017-02-13 PROCEDURE — 99212 OFFICE O/P EST SF 10 MIN: CPT | Performed by: PODIATRIST

## 2017-03-12 ENCOUNTER — GENERIC CONVERSION - ENCOUNTER (OUTPATIENT)
Dept: OTHER | Facility: OTHER | Age: 82
End: 2017-03-12

## 2017-04-20 ENCOUNTER — ALLSCRIPTS OFFICE VISIT (OUTPATIENT)
Dept: OTHER | Facility: OTHER | Age: 82
End: 2017-04-20

## 2017-05-17 ENCOUNTER — ALLSCRIPTS OFFICE VISIT (OUTPATIENT)
Dept: OTHER | Facility: OTHER | Age: 82
End: 2017-05-17

## 2017-07-21 ENCOUNTER — ALLSCRIPTS OFFICE VISIT (OUTPATIENT)
Dept: OTHER | Facility: OTHER | Age: 82
End: 2017-07-21

## 2017-07-31 ENCOUNTER — GENERIC CONVERSION - ENCOUNTER (OUTPATIENT)
Dept: OTHER | Facility: OTHER | Age: 82
End: 2017-07-31

## 2017-08-22 ENCOUNTER — GENERIC CONVERSION - ENCOUNTER (OUTPATIENT)
Dept: OTHER | Facility: OTHER | Age: 82
End: 2017-08-22

## 2017-08-30 ENCOUNTER — GENERIC CONVERSION - ENCOUNTER (OUTPATIENT)
Dept: OTHER | Facility: OTHER | Age: 82
End: 2017-08-30

## 2017-09-13 ENCOUNTER — ALLSCRIPTS OFFICE VISIT (OUTPATIENT)
Dept: OTHER | Facility: OTHER | Age: 82
End: 2017-09-13

## 2017-09-13 DIAGNOSIS — I10 ESSENTIAL (PRIMARY) HYPERTENSION: ICD-10-CM

## 2017-09-13 DIAGNOSIS — E11.65 TYPE 2 DIABETES MELLITUS WITH HYPERGLYCEMIA (HCC): ICD-10-CM

## 2017-09-13 DIAGNOSIS — E78.00 PURE HYPERCHOLESTEROLEMIA: ICD-10-CM

## 2017-09-13 DIAGNOSIS — G20 PARKINSON'S DISEASE (HCC): ICD-10-CM

## 2017-09-13 DIAGNOSIS — Z12.5 ENCOUNTER FOR SCREENING FOR MALIGNANT NEOPLASM OF PROSTATE: ICD-10-CM

## 2017-09-13 LAB — HBA1C MFR BLD HPLC: 5.5 %

## 2017-10-19 DIAGNOSIS — G81.90 HEMIPLEGIA (HCC): ICD-10-CM

## 2017-10-31 ENCOUNTER — GENERIC CONVERSION - ENCOUNTER (OUTPATIENT)
Dept: OTHER | Facility: OTHER | Age: 82
End: 2017-10-31

## 2017-12-22 ENCOUNTER — ALLSCRIPTS OFFICE VISIT (OUTPATIENT)
Dept: OTHER | Facility: OTHER | Age: 82
End: 2017-12-22

## 2017-12-23 NOTE — PROGRESS NOTES
Assessment   1  Parkinson's disease (332 0) (G20)   2  Memory loss (780 93) (R41 3)   3  Lightheadedness (780 4) (R42)   4  Polyneuropathy (356 9) (G62 9)    Plan   Depression, Parkinson's disease    · Citalopram Hydrobromide 20 MG Oral Tablet; TAKE 1 TABLET DAILY   Rx By: Fadumo Jose; Dispense: 30 Days ; #:30 Tablet; Refill: 5;For: Depression, Parkinson's disease; LASHAUN = N; Verified Transmission to [facility] Last Updated By: System, SureScripts; 12/22/2017 2:18:56 PM  Difficulty in walking, Memory loss, Parkinson's disease, Urgency of urination    · Follow-up visit in 4 Months Evaluation and Treatment  Follow-up  Status: Hold For -    Scheduling  Requested for: 10Chy8674   Ordered; For: Difficulty in walking, Memory loss, Parkinson's disease, Urgency of urination; Ordered By: Fadumo Jose Performed:  Due: 79BSO0274  Encephalopathy, Parkinson's disease, Staggering gait    · OLANZapine 20 MG Oral Tablet; TAKE  1 TABLET AT BEDTIME   Rx By: Fadumo Jose; Dispense: 30 Days ; #:30 Tablet; Refill: 5;For: Encephalopathy, Parkinson's disease, Staggering gait; LASHAUN = N; Verified Transmission to [facility] #8826; Last Updated By: System, SureScripts; 12/22/2017 2:18:56 PM  Memory loss, Parkinson's disease    · Donepezil HCl - 5 MG Oral Tablet; 1 po daily   Rx By: Fadumo Jose; Dispense: 30 Days ; #:30 Tablet; Refill: 5;For: Memory loss, Parkinson's disease; LASHAUN = Y; Verified Transmission to [facility] #9905; Last Updated By: System, SureScripts; 12/22/2017 2:18:55 PM  Parkinson's disease    · Rytary 85  MG Oral Capsule Extended Release; Change dose to 2 at 7;15    ,2  at 12;15  1 at 4;15 ,2  at 8;15 and  2 at    11;0pm   Rx By: Fadumo Jose; Dispense: 30 Days ; #:240 Capsule Extended Release; Refill: 5;For: Parkinson's disease; LASHAUN = Y; Verified Transmission to [facility]9540 Msg to Pharmacy: increasing the dose;  Last Updated By: System, SureScripts; 12/22/2017 2:18:55 PM  Stroke syndrome · Aspirin-Dipyridamole ER  MG Oral Capsule Extended Release 12 Hour    (Aggrenox); take 1 capsule by mouth twice a day   Rx By: Mag Goldman; Dispense: 30 Days ; #:60 Capsule Extended Release 12 Hour; Refill: 5;For: Stroke syndrome; LASHAUN = N; Verified Transmission to [facility]; Last Updated By: SystemChannelinsight; 12/22/2017 2:18:55 PM    Discussion/Summary   Discussion Summary:    Change dose of Rytary to 2,2 2,2 and 1 at 11; so more tablets in evening , to decrease freezing  5 mg daily  on Aggrenox bid  pressure better    therapy stopped , encourage [place] to maintain exercise    better , non life threatening , on olanzapine 20 mg daily    f/u in 4 mths  Chief Complaint   Chief Complaint Free Text Note Form: PD, demenita      History of Present Illness   HPI: This is an 69-year-old patient who 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  He is currently utilizing Aggrenox twice a day with no side effects  He does have some ongoing short-term memory loss and Parkinsons disease   Sejal Bailon He is on Rytary 61 25 3, 2,2 and 2 at 8;15 pm   he is stable during the day but reports episodes of freezing at Counts include [facility] beds at the [facility]   Often Gayla Steven will find him on the floor in the middle of the night   He was recently started on Aricept  His is more talkative but reports intermittent chest discomfort  None in last three days  When discussing in details, more in the mid chest with certain foods  On omeprazole    stopped taking the Exelon due to hallucination   on Zyprexa 20 mg daily controlled the halluncinations comptan or da agonist both caused hallucinations  Recently a second blood pressure med was added and he had low blood pressure  Since one of the meds have been stopped, the blood pressure is much better  He does have stiffness in the morning  Using play dough and moving his hands does help   Music does help the PD ambulation                 Review of Systems   Neurological ROS: stiffness Constitutional: recent weight loss  HEENT:  no sinus problems, not feeling congested, no blurred vision, no dryness of the eyes, no eye pain, no hearing loss, no tinnitus, no mouth sores, no sore throat, no hoarseness, no dysphagia, no masses, no bleeding  Cardiovascular:  no chest pain or pressure, no palpitations present, the heart rate was not rapid or irregular, no swelling in the arms or legs, no poor circulation  Respiratory:  no unusual or persistant cough, no shortness of breath with or without exertion  Gastrointestinal:  no nausea, no vomiting, no diarrhea, no abdominal pain, no changes in bowel habits, no melena, no loss of bowel control  Genitourinary:  no incontinence, no feelings of urinary urgency, no increase in frequency, no urinary hesitancy, no dysuria, no hematuria  Musculoskeletal:  no arthralgias, no myalgias, no immobility or loss of function, no head/neck/back pain, no pain while walking  Integumentary  no masses, no rash, no skin lesions, no livedo reticularis  Psychiatric:  no anxiety, no depression, no mood swings, no psychiatric hospitalizations, no sleep problems  Endocrine  no unusual weight loss or gain, no excessive urination, no excessive thirst, no hair loss or gain, no hot or cold intolerance, no menstrual period change or irregularity, no loss of sexual ability or drive, no erection difficulty, no nipple discharge  Hematologic/Lymphatic:  no unusual bleeding, no tendency for easy bruising, no clotting skin or lumps  Neurological General: lightheadedness-- and-- trouble falling asleep  Neurological Mental Status: confusion,-- mood swings-- and-- memory problems  Neurological Cranial Nerves: vertigo or dizziness-- and-- slurred speech  Neurological Motor findings include:  no tremor, no twitching, no cramping(pre/post exercise), no atrophy        Neurological Coordination: unsteadiness-- and-- balance difficulties--   no unsteadiness, no vertigo or dizziness, no clumsiness, no problems reaching for objects  Neurological Sensory:  no numbness, no pain, no tingling, does not fall when eyes closed or taking a shower  Neurological Gait: difficulty walking  Active Problems   1  Abnormal loss of weight (783 21) (R63 4)   2  Anemia (285 9) (D64 9)   3  Atopic dermatitis (691 8) (L20 9)   4  Bradycardia, drug induced (427 89,E947 9) (R00 1,T50 905A)   5  History of Community acquired pneumonia (5) (J18 9)   6  Decubitus ulcer of left heel, stage 1 (557 82,560 17) (L89 621)   7  Decubitus ulcer of left heel, stage 2 (406 46,276 82) (Z59 203)   8  Decubitus ulcer of right heel, stage 1 (707 07,707 21) (L89 611)   9  Dementia due to 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 behavioral disturbance (294 10) (F02 80)   10  Depression (311) (F32 9)   11  Diabetes mellitus type II, uncontrolled (250 02) (E11 65)   12  Diabetic eye exam (V72 0,250 00) (E11 9,Z01 00)   13  Difficulty in walking (719 7) (R26 2)   14  Dyslipidemia (272 4) (E78 5)   15  Elevated PSA (790 93) (R97 20)   16  Encephalopathy (348 30) (G93 40)   17  Encounter for diabetic foot exam (250 00) (E11 9)   18  Encounter for prostate cancer screening (V76 44) (Z12 5)   19  Esophageal reflux (530 81) (K21 9)   20  Fatigue (780 79) (R53 83)   21  Fecal soiling due to fecal incontinence (787 60) (R15 9)   22  Flu syndrome (487 1) (J11 1)   23  Foul smelling urine (791 9) (R82 90)   24  Hallucinations (780 1) (R44 3)   25  Hemiparesis (342 90) (G81 90)   26  Hemiplegia (On Exam) (342 80)   27  History of cerebrovascular accident with residual effects (438 9) (I69 90)   28  Hypercholesterolemia (272 0) (E78 00)   29  Hypertension (401 9) (I10)   30  Lightheadedness (780 4) (R42)   31  Lower back pain (724 2) (M54 5)   32  Mass of right eye (379 92) (H57 8)   33  Memory loss (780 93) (R41 3)   34  Muscle weakness (generalized) (728 87) (M62 81)   35   Nasal Discharge (Symptom) (784 99)   36  Need for pneumococcal vaccination (V03 82) (Z23)   37  Orthostatic hypotension (458 0) (I95 1)   38  Parkinson's disease (332 0) (G20)   44  Peripheral neuropathy (356 9) (G62 9)   40  Polyneuropathy (356 9) (G62 9)   41  Schizophrenia (295 90) (F20 9)   42  Scoliosis (737 30) (M41 9)   43  Screening for genitourinary condition (V81 6) (Z13 89)   44  Screening for neurological condition (V80 09) (Z13 89)   45  Sinus pain (478 19) (J34 89)   46  Skin cancer (173 90) (C44 90)   47  Sleep disorder (780 50) (G47 9)   48  Staggering gait (781 2) (R26 0)   49  Stroke syndrome (434 91) (I63 9)   50  Tremor (781 0) (R25 1)   51  Urgency of urination (788 63) (R39 15)    Past Medical History   1  History of Community acquired pneumonia (5) (J18 9)   2  Denied: History of drug abuse   3  History of type 2 diabetes mellitus (V12 29) (Z86 39)   4  Need for pneumococcal vaccination (V03 82) (Z23)    Surgical History   1  History of Hallux Valgus (Bunion) Correction    Family History   Son    1  Family history of Family Health Status Children 1  Living Daughters   2  Family history of Family Health Status Children 1  Living Sons  Family History    3  Family history of Acute Myocardial Infarction (V17 3)   4  Family history of Carcinoma Of The Pancreas   5  Family history of Depression   6  Family history of Diabetes Mellitus (V18 0)   7  Family history of Hypertension (V17 49)   8  Family history of Marital History - Currently     Social History    · Denied: History of Alcohol Use (History)   · Daily Coffee Consumption (4  Cups/Day)   · Denied: History of Drug Use   · Marital History - Currently    · Never A Smoker   · No alcohol use   · Occupation: Retired   · Preferred Language [address]   · [address]    1  Accu-Chek Rain Device; USE AS DIRECTED; Therapy: 71LZX3906 to (Last Rx:18Nfl5088)  Requested for: 32XJD0787 Ordered   2   Accu-Chek Softclix Lancets Miscellaneous; check glucose bid; Therapy: 46YIQ5797 to (Last Rx:07Uex2118)  Requested for: 78KIV1033 Ordered   3  AmLODIPine Besylate 10 MG Oral Tablet; TAKE 1 TABLET DAILY; Therapy: 71Cqt5265 to (Evaluate:11Jan2018)  Requested for: 08Jfg9548; Last     Rx:28Yhq2831 Ordered   4  Anucort-HC 25 MG Rectal Suppository; Therapy: (Megan Vázquze) to Recorded   5  Aspirin-Dipyridamole ER  MG Oral Capsule Extended Release 12 Hour; take 1     capsule by mouth twice a day; Therapy: 56RNW0258 to (Ezzie Castleman)  Requested for: 82DXY3362; Last     Rx:13Nov2017; Status: ACTIVE - Renewal Denied, Transmit to Pharmacy - Awaiting     Verification Ordered   6  Blood Pressure Monitor Device; check BP twice daily; Therapy: 49BSH3409 to (Last Rx:17May2017)  Requested for: 93RRV5075 Ordered   7  Citalopram Hydrobromide 20 MG Oral Tablet; TAKE 1 TABLET DAILY; Therapy: 43BDN9613 to (Evaluate:12Mar2018)  Requested for: 63Tga3801; Last     Rx:52Uxx1505 Ordered   8  Depend Pant Large Miscellaneous; change four times a day; Therapy: 33ZTC7581 to (Last Rx:20Oct2017) Ordered   9  Donepezil HCl - 5 MG Oral Tablet; 1 po daily; Therapy: 03LBA2983 to (Evaluate:17Jan2018)  Requested for: 10Xxh7914; Last     Rx:83Mqb4115 Ordered   10  Ferrous Sulfate 325 (65 Fe) MG Oral Tablet; take 1 tablet by mouth once daily; Therapy: 87UYQ3561 to (Evaluate:10Nov2016)  Requested for: 99Hqj0724; Last      Rx:87Yxr8474 Ordered   11  FreeStyle Lancets Miscellaneous; Check blood sugar 3 times a day; Therapy: 27Icw0515 to (Evaluate:19Feb2018)  Requested for: 04Qxx1367; Last      Rx:67Jck7138 Ordered   12  FreeStyle Lite Test In [employer]; test twice daily; Therapy: 66Gow9611 to (Evaluate:20Jan2018)  Requested for: 64Fzz6504; Last      Rx:44Ivz7656 Ordered   13  FreeStyle Test In Vitro Strip; TEST 3 TIMES DAILY;       Therapy: 65Yqs7354 to (Evaluate:45Lab1881)  Requested for: 29Qjw1159; Last      [facility] Ordered   14  Glucerna Oral Liquid; drink 2 to 3 cans per day as needed; Therapy: 98GJZ5647 to (Last Rx:59Szt2091) Ordered   15  Iron 325 (65 Fe) MG Oral Tablet; take one tablet by mouth twice daily; Therapy: 65Nnf1405 to (Last Rx:78Mzi2650)  Requested for: 26Tdf3201 Ordered   16  Lantus SoloStar 100 UNIT/ML Subcutaneous Solution Pen-injector; INJECT 20 UNIT      Bedtime; Therapy: 93VUN9778 to (Last Rx:32Prg4738)  Requested for: 91Lfr3092 Ordered   17  Lidocaine HCl GEL; 2% applied to wound base prior to debridement at the wound center      for pain control; Therapy: (QUNUYYEFRIU:03DZR3363) to Recorded   18  Lisinopril 20 MG Oral Tablet; Take 1 tablet daily; Therapy: 73Dll0699 to (Last Rx:76Srw9271)  Requested for: 29Cym5545 Ordered   19  Metoprolol Tartrate 25 MG Oral Tablet; Take one tablet twice daily; Therapy: 15AZK6491 to (Last Rx:85Hwo8885)  Requested for: 59Qhn1923 Ordered   20  NovoFine Plus 32G X 4 MM Miscellaneous; use 1 daily; Therapy: 45WWO8742 to (Last Rx:07Mar2017)  Requested for: 97XYU6820 Ordered   21  OLANZapine 20 MG Oral Tablet; TAKE  1 TABLET AT BEDTIME; Therapy: 80ZTZ4160 to (Evaluate:08Sep2017)  Requested for: 88EJP5423; Last      Rx:12Mar2017 Ordered   22  OLANZapine 20 MG Oral Tablet; take 1 tablet daily at bedtime  Requested for:      54Jnf0859; Last Rx:20Apr2017 Ordered   23  Omeprazole 20 MG Oral Capsule Delayed Release; take 1 capsule by mouth once daily; Therapy: 04Rkg9852 to (Shawn Rowe)  Requested for: 19FGD2536; Last      Rx:01Jan2017 Ordered   24  Rytary 61  MG Oral Capsule Extended Release; 3 tabs at 715,  2 tabs at 1215, 2      tabs at 415 and 2 tabs at 815; Therapy: 57Etk1516 to (Evaluate:17Jan2018)  Requested for: 42Rio3688; Last      Rx:08Mve2650 Ordered   25  Simvastatin 40 MG Oral Tablet; TAKE 1 TABLET Bedtime;       Therapy: 70FDZ1147 to (Evaluate:79Wfz5864)  Requested for: 47Ebz4071; Last      Rx:41Jwz6562 Ordered 26  Underpads Extra Large Miscellaneous; 4 pads per month; Therapy: 52LLF2322 to (Last Rx:17Ikz8744) Ordered   27  Vitamin D 2000 UNIT Oral Tablet; Take 1 tablet daily  Requested for: 14Apr2016; Last      Rx:80Pql4134 Ordered    Allergies   1  Mirapex TABS    Vitals   Signs   Recorded: 50Zth4390 01:38PM   Heart Rate: 70  Respiration: 16  Systolic: 820, LUE, Sitting  Diastolic: 54, LUE, Sitting  Height: 5 ft   Weight: 112 lb   BMI Calculated: 21 87  BSA Calculated: 1 46    Physical Exam        Constitutional      General appearance: Abnormal  -- (140/60 sitting, and standing )-- seen at 2;00 pm last rytary at 12;15 facial bradykinesia,  Musculoskeletal      Gait and station: Abnormal  -- (requires assistance to stand ) Gait evaluation demonstrated a wide-based and ataxic gait,-- stooping-- and-- shuffling -- moderate , requires 2 to stand  Muscle strength: Abnormal  -- , hand  4/5 decrease in ffm on right ,increase in right sided tone  Muscle tone: Abnormal        Motor tone:  cogwheel rigidity was present in both arms  -- worse on right  Involuntary movements: None observed  no tremors were noted  -- no tremor, mild cogwheeling  Neurologic      Orientation to person, place, and time: Abnormal        Recent and remote memory: Abnormal  -- (more alert ) Memory: intact long term memory  Language: Abnormal  -- Speech less dysarhtri  2nd cranial nerve: Normal   pupils equal in size, round, reactive to light, with normal accommodation      3rd, 4th, and 6th cranial nerves: Normal   extraocular movements intact      5th cranial nerve: Normal   normal facial sensation  7th cranial nerve: Abnormal   The right face showed flattening of the nasolabial fold  -- right  8th cranial nerve: Normal   Nystagmus no nystagmus seen  11th cranial nerve: Normal   no weakness of shoulder elevation-- and-- no sternocleidomastoid weakness        Sensation: Normal        Reflexes: Abnormal  -- Absent at the [place]  Coordination: Abnormal  -- speaks Citizen of [place] , more responsiveness  Judgment and insight: Abnormal        Mood and affect: Abnormal   Mood and Affect: appropriate mood,-- not blunted-- and-- not depressed  Future Appointments      Date/Time Provider Specialty Site   03/02/2018 01:30 PM Roberta Miller DO Neurology [facility] 5156     Signatures    Electronically signed by :  Rosita Aquino DO; Dec 22 2017  3:01PM EST                       (Author)

## 2018-01-10 NOTE — MISCELLANEOUS
Please contact our office at your convenience  It is important that we speak to you  REGARDING:   Porfavor contacte a nuestra oficina  Es muy  importante que hablemos con usted   SOBRE:          Scheduling an Appointment/ Programar juan Isis          Rescheduling an Appointment/ Re-programar  juan Isis     Cancelling an Appointment/Cancelar li Isis     Your Lab/ X-ray Results/Resultados         Updating your Phone number or Address/ Actualizar li Enio Telefonico            Verify your Primary Providor/ Verificar li Proveedor primario          X  Other/Otro: I have called  about your Urology apt  Please call [phone]  Please Contact Our Office to Schedule Your Appointment  It is Very Important That You See Your Provider  for your  Routine Medical Care  If you are seeing a New Providor,  Please Contact our Office so we can update our Records  Thank You  Comuníquese con nuestra oficina para programar li isis  Es Muy Importante que usted darrick li proveedor para li   [address] [facility] Floor de [place]  Si usted esta viendo un Proveedor   [place], [place] con Wren Cayden oficina para actualizar   nuestro registros  Zuri

## 2018-01-11 NOTE — MISCELLANEOUS
To whom it may concern,      Anita Song is the son of Grecia Dickens, who is under the care of [facility]  Due to medical reasons, this patient is unable to be left unattended and Stacey Madsen has had to care for his father  Please excuse him from work on 07/28/2016  Any  questions call our office at [phone]  Sincerely,    JEANETTE Bruno  Electronically signed by: Mark Hankins DO  Jul 28 2016  1:34PM EST Author

## 2018-01-11 NOTE — RESULT NOTES
Message  labs reviewed , glucose 182, alb 3 2 but cbc better, b12, folate , vit d normal      Signatures   Electronically signed by :  Mikal Crowder DO; Nov 15 2016  8:21AM EST                       (Author)

## 2018-01-11 NOTE — MISCELLANEOUS
To whom it may concern,      Min Ricci is the son of Karie Garcia, who is under the care of [facility]  Due to medical reasons, this patient is unable to be left unattended  Currently his wife, who is his primary caregiver, is hospitalized, and due to this reason  Jamir Macias has had to care for his father  Please excuse him from work on 03/10/2016 through 03/15/2016  Any questions call our office at [phone]  Sincerely,    JEANETTE Bernal  Electronically signed by: Jes Isbell DO  Mar 15 2016  9:16AM EST Author

## 2018-01-11 NOTE — MISCELLANEOUS
To Whom it May Concern,    Please excuse Micah Og from work on 3/15/17 as it was medically necessary that he care for his father on this day  For any further questions please contact our office at [phone]  Sincerely,    JEANETTE Barraza O  Electronically signed by: Lamont Ruiz DO  Mar 17 2017  9:10AM EST Author

## 2018-01-12 VITALS
HEIGHT: 60 IN | DIASTOLIC BLOOD PRESSURE: 54 MMHG | BODY MASS INDEX: 24.35 KG/M2 | RESPIRATION RATE: 16 BRPM | TEMPERATURE: 97.2 F | HEART RATE: 80 BPM | WEIGHT: 124 LBS | SYSTOLIC BLOOD PRESSURE: 130 MMHG

## 2018-01-12 NOTE — RESULT NOTES
Message   please let Tiara Barranquitas know , no change in ctscan of head a when compared to prior study      Verified Results  * CT HEAD WO CONTRAST 08Mgj0092 12:46PM Manuel Shelby Order Number: [id number]   Performing Comments: new dysarhtra, rigth Hemiapreisis , r/o new ischemia   - Patient Instructions: To schedule this appointment, please contact Central Scheduling at [phone]  Test Name Result Flag Reference   CT HEAD WO CONTRAST (Report)     CT BRAIN - WITHOUT CONTRAST     INDICATION: 60-year-old male, confusion, Parkinson's disease   COMPARISON: 5/16/2016 CT     TECHNIQUE: CT examination of the brain was performed  In addition to axial images, coronal reformatted images were created and submitted for interpretation  Examination was performed utilizing techniques to minimize radiation dose, including the use    of dose reduction software  IMAGE QUALITY: Diagnostic  FINDINGS:      PARENCHYMA:    Moderate chronic microvascular ischemic changes are present within the white matter of the frontal and parietal lobes and basal ganglia regions bilaterally  These findings are stable  Age-appropriate cerebral atrophy is present        No acute intracranial hemorrhage or mass effect     VENTRICLES AND EXTRA-AXIAL SPACES:  Normal      VISUALIZED ORBITS AND PARANASAL SINUSES: Normal      CALVARIUM AND EXTRACRANIAL SOFT TISSUES: Normal        IMPRESSION:   Moderate chronic microvascular ischemic disease involving supratentorial white matter and bilateral basal ganglia     Age-related atrophy     No acute hemorrhage or mass effect     Similar to previous CT study       Workstation performed: [id number]     Signed by:   Surya Goldman MD   8/12/16

## 2018-01-13 VITALS
DIASTOLIC BLOOD PRESSURE: 62 MMHG | HEART RATE: 72 BPM | SYSTOLIC BLOOD PRESSURE: 122 MMHG | WEIGHT: 124 LBS | TEMPERATURE: 97 F | RESPIRATION RATE: 18 BRPM | HEIGHT: 60 IN | BODY MASS INDEX: 24.35 KG/M2

## 2018-01-13 VITALS
RESPIRATION RATE: 16 BRPM | HEIGHT: 60 IN | BODY MASS INDEX: 24.39 KG/M2 | WEIGHT: 124.25 LBS | SYSTOLIC BLOOD PRESSURE: 80 MMHG | DIASTOLIC BLOOD PRESSURE: 38 MMHG | HEART RATE: 70 BPM

## 2018-01-13 NOTE — RESULT NOTES
Verified Results  (1) COMPREHENSIVE METABOLIC PANEL 65PGW1256 44:45QP Checo Bowers Order Number: [id number]     Test Name Result Flag Reference   GLUCOSE,RANDM 447 mg/dL H    If the patient is fasting, the ADA then defines impaired fasting glucose as > 100 mg/dL and diabetes as > or equal to 123 mg/dL  SODIUM 138 mmol/L  136-145   POTASSIUM 3 3 mmol/L L 3 5-5 3   CHLORIDE 100 mmol/L  100-108   CARBON DIOXIDE 31 mmol/L  21-32   ANION GAP (CALC) 7 mmol/L  4-13   BLOOD UREA NITROGEN 14 mg/dL  5-25   CREATININE 1 03 mg/dL  0 60-1 30   Standardized to IDMS reference method   CALCIUM 9 4 mg/dL  8 3-10 1   BILI, TOTAL 0 95 mg/dL  0 20-1 00   ALK PHOSPHATAS 64 U/L     ALT (SGPT) 9 U/L L 12-78   AST(SGOT) 20 U/L  5-45   ALBUMIN 2 9 g/dL L 3 5-5 0   TOTAL PROTEIN 7 6 g/dL  6 4-8 2   eGFR Non-African American      >60 0 ml/min/1 73sq m   [facility] [employer] Disease Education Program recommendations are as follows:  GFR calculation is accurate only with a steady state creatinine  Chronic Kidney disease less than 60 ml/min/1 73 sq  meters  Kidney failure less than 15 ml/min/1 73 sq  meters         Signatures   Electronically signed by : OSCAR Bashir ; Jan 6 2017 11:59AM EST                       (Author)

## 2018-01-13 NOTE — MISCELLANEOUS
October 5, 2016       To whom it may concern,      Rell Hanson is the son of Khadijah Montalvo, who is under the care of [facility]  Due to medical  reasons, this patient is unable to be left unattended and Primo Flanagan has had to care for his father  on October 3, 4 and 5   Please excuse him from work on  these days   Any questions call our office at [phone]  Sincerely,    JEANETTE Multani  Electronically signed by: Annette Ulloa DO  Oct  5 2016  4:20PM EST Author

## 2018-01-13 NOTE — MISCELLANEOUS
Assessment    1  Diabetes mellitus type II, uncontrolled (250 02) (E11 65)   2  History of Community acquired pneumonia (5) (J18 9)   3  Decubitus ulcer of left heel, stage 1 (648 44,290 46) (L89 621)   4  Parkinson's disease (332 0) (G20)    Plan  Diabetes mellitus type II, uncontrolled    · Brush your teeth freq1 and floss at least once a day ; Status:Complete;   Done:  89MIJ8870 11:47AM   Ordered; For:Diabetes mellitus type II, uncontrolled; Ordered By:Vania Gu;   · Follow a diabetic diet with 1500 calories ; Status:Complete;   Done: 04AET5538 11:47AM   Ordered; For:Diabetes mellitus type II, uncontrolled; Ordered By:Nicci Gu;   · Inspect your feet daily ; Status:Complete;   Done: 32XNA3779 11:47AM   Ordered; For:Diabetes mellitus type II, uncontrolled; Ordered By:Vania Gu;   · Some eating tips that can help you lose weight ; Status:Complete;   Done: 81KPN4859  11:47AM   Ordered; For:Diabetes mellitus type II, uncontrolled; Ordered By:Vania Gu;   · There are many exercise options for seniors ; Status:Complete;   Done: 13THE3511  11:47AM   Ordered; For:Diabetes mellitus type II, uncontrolled; Ordered By:Vania Gu; Discussion/Summary  Discussion Summary:   Discussed at length diet  DIscussed carbohydrates, and the difference between simple and complex carbohydrates  Discussed portion size  Keep blood sugar log  Medication SE Review and Pt Understands Tx: Possible side effects of new medications were reviewed with the patient/guardian today  The treatment plan was reviewed with the patient/guardian  The patient/guardian understands and agrees with the treatment plan      Chief Complaint  Chief Complaint Free Text Note Form: [facility] FU for bronchitis      History of Present Illness  TCM Communication [facility]: The patient is being contacted for follow-up after hospitalization and DR Yarely Cho AT [facility]  He was hospitalized at [facility]   The dates of hospitalization:, date of admission: 01/6/17, date of discharge: 1/17/17, BRONCHITIS,FOOT BLISTER  He was discharged to home  Follow-up appointments with other specialists: WOUND CARE  Symptoms: fatigue  Communication performed and completed by Davey Longoria   HPI: 81 yo Malian speaking male with multiple medical issues including Parkinson's disease, Dementia, HTN and newly diagnosed DM  He was recently inpatient at [facility] for acute tracheobronchitis and foot blister  He was treated with IV antibiotics, and discharged to home  Inpatient rehabilitation was recommended however, patient's family insisted on [facility], which the insurance denied, so family took him home with VNA and home PT  He is here with his son today  Son states dad is doing much better  Feels he is almost back to his baseline  Appetite has considerably improved  FBG 90 to 130  Post prandial  to 280        Review of Systems  Complete-Male:   Constitutional: No fever or chills, feels well, no tiredness, no recent weight gain or weight loss  Eyes: No complaints of eye pain, no red eyes, no discharge from eyes, no itchy eyes  ENT: no complaints of earache, no hearing loss, no nosebleeds, no nasal discharge, no sore throat, no hoarseness  Cardiovascular: No complaints of slow heart rate, no fast heart rate, no chest pain, no palpitations, no leg claudication, no lower extremity, no intermittent leg claudication and no extremity edema  Respiratory: No complaints of shortness of breath, no wheezing, no cough, no SOB on exertion, no orthopnea or PND  Gastrointestinal: No complaints of abdominal pain, no constipation, no nausea or vomiting, no diarrhea or bloody stools  Genitourinary: No complaints of dysuria, no incontinence, no hesitancy, no nocturia, no genital lesion, no testicular pain  Musculoskeletal: No complaints of arthralgia, no myalgias, no joint swelling or stiffness, no limb pain or swelling     Integumentary: No complaints of skin rash or skin lesions, no itching, no skin wound, no dry skin  Neurological: no headache, no confusion and no convulsions    The patient presents with complaints of gradual onset of intermittent episodes of moderate dizziness, described as lightheadedness and faintness  His symptoms are caused by no known event  Psychiatric: Is not suicidal, no sleep disturbances, no anxiety or depression, no change in personality, no emotional problems  Endocrine: No complaints of proptosis, no hot flashes, no muscle weakness, no erectile dysfunction, no deepening of the voice, no feelings of weakness  Hematologic/Lymphatic: No complaints of swollen glands, no swollen glands in the neck, does not bleed easily, no easy bruising  Active Problems     1  Abnormal loss of weight (783 21) (R63 4)   2  Anemia (285 9) (D64 9)   3  Atopic dermatitis (691 8) (L20 9)   4  Bradycardia, drug induced (427 89,E947 9) (R00 1,T50 905A)   5  History of Community acquired pneumonia (5) (J18 9)   6  Decubitus ulcer of left heel, stage 1 (030 93,272 36) (L89 621)   7  Depression (311) (F32 9)   8  Dyslipidemia (272 4) (E78 5)   9  Encephalopathy (348 30) (G93 40)   10  Esophageal reflux (530 81) (K21 9)   11  Fatigue (780 79) (R53 83)   12  Fecal soiling due to fecal incontinence (787 60) (R15 9)   13  Flu syndrome (487 1) (J11 1)   14  Foul smelling urine (791 9) (R82 90)   15  Hallucinations (780 1) (R44 3)   16  Hemiparesis (342 90) (G81 90)   17  Hemiplegia (On Exam) (342 80)   18  Hypercholesterolemia (272 0) (E78 00)   19  Hypertension (401 9) (I10)   20  Lightheadedness (780 4) (R42)   21  Lower back pain (724 2) (M54 5)   22  Mass of right eye (379 92) (H57 8)   23  Memory loss (780 93) (R41 3)   24  Muscle weakness (generalized) (728 87) (M62 81)   25  Nasal Discharge (Symptom) (784 99)   26  Need for pneumococcal vaccination (V03 82) (Z23)   27  Orthostatic hypotension (458 0) (I95 1)   28  Peripheral neuropathy (356 9) (G62 9)   29  Schizophrenia (295 90) (F20 9)   30  Scoliosis (737 30) (M41 9)   31  Sinus pain (478 19) (J34 89)   32  Skin cancer (173 90) (C44 90)   33  Sleep disorder (780 50) (G47 9)   34  Staggering gait (781 2) (R26 0)   35  Stroke syndrome (434 91) (I63 9)   36  Tremor (781 0) (R25 1)   37  Urgency of urination (788 63) (R39 15)    Dementia due to 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 behavioral disturbance (294 10) (F02 80)       Polyneuropathy (356 9) (G62 9)       Difficulty in walking (719 7) (R26 2)       Parkinson's disease (332 0) (G20)          Past Medical History    1  History of Community acquired pneumonia (5) (J18 9)   2  Denied: History of drug abuse   3  History of type 2 diabetes mellitus (V12 29) (Z86 39)   4  Need for pneumococcal vaccination (V03 82) (Z23)    Surgical History    1  History of Hallux Valgus (Bunion) Correction    Family History  Son    1  Family history of Family Health Status Children 1  Living Daughters   2  Family history of Family Health Status Children 1  Living Sons  Family History    3  Family history of Acute Myocardial Infarction (V17 3)   4  Family history of Carcinoma Of The Pancreas   5  Family history of Depression   6  Family history of Diabetes Mellitus (V18 0)   7  Family history of Hypertension (V17 49)   8  Family history of Marital History - Currently     Social History    · Denied: History of Alcohol Use (History)   · Daily Coffee Consumption (4  Cups/Day)   · Denied: History of Drug Use   · Marital History - Currently    · Never A Smoker   · No alcohol use   · Occupation: Retired   · Preferred Language [facility] [place]   · [address]   1  Aggrenox  MG Oral Capsule Extended Release 12 Hour; take 1 capsule by mouth   twice a day; Therapy: 37YTH1458 to (72 231 101)  Requested for: 51WVD2897; Last   Rx:04Nov2016 Ordered   2  Anucort-HC 25 MG Rectal Suppository; Therapy: (Recorded:22Apr2014) to Recorded   3   Azithromycin 250 MG Oral Tablet; TAKE 2 TABLETS ON DAY 1 THEN TAKE 1 TABLET A   DAY FOR 4 DAYS; Therapy: 42SQA4886 to (Last LZ:37SYF1157)  Requested for: 61APD1438 Ordered   4  Citalopram Hydrobromide 20 MG Oral Tablet; TAKE 1 TABLET DAILY; Therapy: 57UYM2191 to (Evaluate:17Aug2016)  Requested for: 85JRZ1186; Last   Rx:60Iuq2241 Ordered   5  Depend Pant Large Miscellaneous; change four times a day; Therapy: 55TYJ2467 to (Last Rx:66Urs6895) Ordered   6  Ferrous Sulfate 325 (65 Fe) MG Oral Tablet; take 1 tablet by mouth once daily; Therapy: 60ZFR0443 to (Evaluate:10Nov2016)  Requested for: 14Apr2016; Last   Rx:57Oam4535 Ordered   7  Iron 325 (65 Fe) MG Oral Tablet; take one tablet by mouth twice daily; Therapy: 57Lnd4883 to (Last Rx:06Ktx8811)  Requested for: 62Pxn6542 Ordered   8  Lisinopril 20 MG Oral Tablet; Take 1 tablet daily; Therapy: 52AKH8166 to (Last Rx:63Laq0289)  Requested for: 33Mkq6410 Ordered   9  Lisinopril 40 MG Oral Tablet; take 1 tablet by mouth once daily; Therapy: 05YDA8382 to (Evaluate:13Jvq3282)  Requested for: 55IDK7443; Last   Rx:02Nov2016 Ordered   10  Loratadine 10 MG Oral Tablet; TAKE 1 TABLET DAILY; Therapy: 50YMV4195 to (Evaluate:11Oct2016)  Requested for: 14Apr2016; Last    Rx:65Qta2054 Ordered   11  Metoprolol Tartrate 25 MG Oral Tablet; Take one tablet twice daily; Therapy: 94EGZ8776 to (Last Rx:38Vjb9397)  Requested for: 13Vpz8797 Ordered   12  OLANZapine 20 MG Oral Tablet; Take 1/2 tab at bedtime x 5 days then 1 tablet at bedtime    thereafter; Therapy: 00CAS8780 to (Evaluate:31Jan2017)  Requested for: 51OSN5563; Last    Rx:43Npl6049 Ordered   13  Omeprazole 20 MG Oral Capsule Delayed Release; take 1 capsule by mouth once daily; Therapy: 67Kbq5069 to (Rick Solders)  Requested for: 69VEJ3409; Last    Rx:01Jan2017 Ordered   14  Rivastigmine 4 6 MG/24HR Transdermal Patch 24 Hour; APPLY 1 PATCH DAILY AS    DIRECTED;     Therapy: 12XII1039 to (Malcom Mojica)  Requested for: 71RDT4617; Last    GS:46AFZ0713; Status: ACTIVE - Renewal Denied Ordered   15  Rytary 61  MG Oral Capsule Extended Release; 3 po tid ( 7;30 , 12;30 and 4;45 ); Therapy: 36Hlc0649 to (Evaluate:01Mar2017)  Requested for: 83Vdn3731; Last    Rx:74Jxk9815 Ordered   16  Simvastatin 40 MG Oral Tablet; TAKE 1 TABLET Bedtime; Therapy: 68UFD6787 to (Evaluate:94Rgw0873)  Requested for: 50Nti4972; Last    Rx:03Tju1251 Ordered   17  Underpads Extra Large Miscellaneous; 4 pads per month; Therapy: 61GQS5932 to (Last Rx:67Uat6964) Ordered   18  Vitamin D 2000 UNIT Oral Tablet; Take 1 tablet daily  Requested for: 43Htt6308; Last    Rx:89Zso7076 Ordered    Allergies    1  Mirapex TABS    Vitals  Signs   Recorded: 91BIY0037 11:09AM   Temperature: 96 5 F  Heart Rate: 68  Respiration: 16  Systolic: 173  Diastolic: 58  Height: 5 ft   Weight: 126 lb   BMI Calculated: 24 61  BSA Calculated: 1 53  O2 Saturation: 97    Physical Exam    Constitutional   General appearance: No acute distress, well appearing and well nourished  Eyes   Conjunctiva and lids: No swelling, erythema, or discharge  Pupils and irises: Equal, round and reactive to light  Ears, Nose, Mouth, and Throat   External inspection of ears and nose: Normal     Otoscopic examination: Tympanic membrance translucent with normal light reflex  Canals patent without erythema  Nasal mucosa, septum, and turbinates: Normal without edema or erythema  Oropharynx: Normal with no erythema, edema, exudate or lesions  Pulmonary   Respiratory effort: No increased work of breathing or signs of respiratory distress  Auscultation of lungs: Clear to auscultation, equal breath sounds bilaterally, no wheezes, no rales, no rhonci  Cardiovascular   Auscultation of heart: Normal rate and rhythm, normal S1 and S2, without murmurs  Abdomen   Abdomen: Non-tender, no masses  Liver and spleen: No hepatomegaly or splenomegaly      Musculoskeletal   Gait and station: Normal  Future Appointments    Date/Time Provider Specialty Site   01/30/2017 08:00 AM Raeann Celaya, [address]   04/20/2017 02:00 PM Debbie Anderson DO Neurology [place] [id number]     Signatures   Electronically signed by : OSCAR Obrien ; Jan 26 2017 11:56AM EST                       (Author)

## 2018-01-13 NOTE — MISCELLANEOUS
To whom it may concern,      Dain Stephens, 11/20/1929,is a patient under the care of [facility]  His medical condition has deteriorated ,as has his wife's ability to care for him   Due  to this, his son, Butch Fall to be present in the evening hours to assist with his care  Due to this reason please allow him to work day shift so that he may care for his father  Any questions call our office at [phone]  Sincerely,       Dr Jose L Thomson      Electronically signed by: Reinier Rodriguez DO  Apr 13 2016  4:42PM EST Author

## 2018-01-14 VITALS
WEIGHT: 126 LBS | HEIGHT: 60 IN | BODY MASS INDEX: 24.74 KG/M2 | RESPIRATION RATE: 16 BRPM | TEMPERATURE: 96.5 F | SYSTOLIC BLOOD PRESSURE: 120 MMHG | OXYGEN SATURATION: 97 % | DIASTOLIC BLOOD PRESSURE: 58 MMHG | HEART RATE: 68 BPM

## 2018-01-14 VITALS
BODY MASS INDEX: 24.54 KG/M2 | WEIGHT: 125 LBS | RESPIRATION RATE: 14 BRPM | HEIGHT: 60 IN | SYSTOLIC BLOOD PRESSURE: 171 MMHG | DIASTOLIC BLOOD PRESSURE: 72 MMHG | HEART RATE: 66 BPM

## 2018-01-14 VITALS
SYSTOLIC BLOOD PRESSURE: 122 MMHG | HEIGHT: 60 IN | HEART RATE: 70 BPM | DIASTOLIC BLOOD PRESSURE: 60 MMHG | WEIGHT: 124 LBS | RESPIRATION RATE: 18 BRPM | TEMPERATURE: 96.8 F | BODY MASS INDEX: 24.35 KG/M2

## 2018-01-14 VITALS
OXYGEN SATURATION: 99 % | RESPIRATION RATE: 16 BRPM | DIASTOLIC BLOOD PRESSURE: 60 MMHG | TEMPERATURE: 97.5 F | HEART RATE: 74 BPM | SYSTOLIC BLOOD PRESSURE: 120 MMHG

## 2018-01-14 VITALS
HEART RATE: 74 BPM | OXYGEN SATURATION: 92 % | TEMPERATURE: 98.4 F | HEIGHT: 60 IN | DIASTOLIC BLOOD PRESSURE: 64 MMHG | RESPIRATION RATE: 16 BRPM | SYSTOLIC BLOOD PRESSURE: 130 MMHG

## 2018-01-14 VITALS
HEIGHT: 60 IN | DIASTOLIC BLOOD PRESSURE: 53 MMHG | SYSTOLIC BLOOD PRESSURE: 109 MMHG | BODY MASS INDEX: 25.13 KG/M2 | RESPIRATION RATE: 18 BRPM | WEIGHT: 128 LBS | HEART RATE: 72 BPM

## 2018-01-14 VITALS — SYSTOLIC BLOOD PRESSURE: 154 MMHG | RESPIRATION RATE: 16 BRPM | DIASTOLIC BLOOD PRESSURE: 72 MMHG | HEART RATE: 65 BPM

## 2018-01-14 NOTE — MISCELLANEOUS
To whom it may concern,      Filomena Stephens is the son of Yamileth Kelley, who is under the care of [facility]  Due to medical reasons, this patient is unable to be left unattended and Susie Harvey has had to care for his father  Please excuse him from work on 05/26/2016  Any  questions call our office at [phone]  Sincerely,    JEANETTE Garcia O  Electronically signed by: Nena Mckeon DO  May 27 2016  3:02PM EST Author

## 2018-01-15 NOTE — MISCELLANEOUS
To whom it may concern,      Bruno Mercer is the son of Jose Willett, who is under the care of [facility]  Due to medical reasons, this patient is unable to be left unattended  Currently  his wife, who is his primary caregiver, is hospitalized, and due to this reason Will Calvo has had to care for his father  Please excuse him from work on 03/10/2016 through 03/14/2016  Any questions call our office at [phone]  Sincerely,     Dr Lin Wright        Electronically signed by: Lin Wright DO  Mar 14 2016 11:44AM EST Author

## 2018-01-15 NOTE — MISCELLANEOUS
To whom it may concern,      Dominick Ramos is the son of Gigi Cruz, who is under the care of [facility]  Due to medical reasons, this patient is unable to be left unattended and Mark Calloway has had to care for his father  Please excuse him from work on 09/06/2016  Any  questions call our office at [phone]  Sincerely,    JEANETTE Mosley O  Electronically signed by: Erlinda Montano DO  Sep  6 2016  9:14AM EST Author

## 2018-01-15 NOTE — MISCELLANEOUS
To Whom it May Concern,      Rudolph Knapp is the son of Jarod Quesada  1929, who is [facility]  Due to medical reasons, this patient is unable to be left unattended and Kikokim Tacos has had to care for his father  on  and 2016  Please excuse him from work on  these days   Any questions call our office at [phone]  Sincerely,    JEANETTE Nuno O  Electronically signed by: Milton Arteaga DO  Dec 29 2016  9:21AM EST Author

## 2018-01-15 NOTE — MISCELLANEOUS
To whom it may concern,      Min Ricci is the son of Karie Garcia, who is under the care of [facility]  Due to medical reasons, this patient is unable to be left unattended and Jamir Macias has had to care for his father  Please excuse him from work on 08/2/2016  Any  questions call our office at [phone]  Sincerely,    JEANETTE Bernal O  Electronically signed by: Jes Isbell DO  Aug  2 2016  9:08AM EST Author

## 2018-01-16 NOTE — MISCELLANEOUS
To Whom it May Concern,    Please excuse Yuniel Grigsby from work on 08/19/17 as it was medically necessary that he care for his father on this day  For any further questions please contact our office at [phone]  Sincerely,    JEANETTE Espinoza O  Electronically signed by: Vasu Malave DO  Aug 22 2017  1:39PM EST Author

## 2018-01-23 VITALS
SYSTOLIC BLOOD PRESSURE: 130 MMHG | HEART RATE: 70 BPM | HEIGHT: 60 IN | WEIGHT: 112 LBS | BODY MASS INDEX: 21.99 KG/M2 | DIASTOLIC BLOOD PRESSURE: 54 MMHG | RESPIRATION RATE: 16 BRPM

## 2018-02-21 ENCOUNTER — OFFICE VISIT (OUTPATIENT)
Dept: FAMILY MEDICINE CLINIC | Facility: CLINIC | Age: 83
End: 2018-02-21
Payer: COMMERCIAL

## 2018-02-21 VITALS
OXYGEN SATURATION: 95 % | TEMPERATURE: 96.1 F | DIASTOLIC BLOOD PRESSURE: 68 MMHG | WEIGHT: 117 LBS | SYSTOLIC BLOOD PRESSURE: 130 MMHG | HEART RATE: 68 BPM | HEIGHT: 60 IN | BODY MASS INDEX: 22.97 KG/M2 | RESPIRATION RATE: 18 BRPM

## 2018-02-21 DIAGNOSIS — Z79.4 TYPE 2 DIABETES MELLITUS WITHOUT COMPLICATION, WITH LONG-TERM CURRENT USE OF INSULIN (HCC): ICD-10-CM

## 2018-02-21 DIAGNOSIS — R10.12 LUQ PAIN: Primary | ICD-10-CM

## 2018-02-21 DIAGNOSIS — E11.9 TYPE 2 DIABETES MELLITUS WITHOUT COMPLICATION, WITH LONG-TERM CURRENT USE OF INSULIN (HCC): ICD-10-CM

## 2018-02-21 DIAGNOSIS — N40.1 BENIGN PROSTATIC HYPERPLASIA WITH NOCTURIA: ICD-10-CM

## 2018-02-21 DIAGNOSIS — I10 ESSENTIAL HYPERTENSION: ICD-10-CM

## 2018-02-21 DIAGNOSIS — R35.1 BENIGN PROSTATIC HYPERPLASIA WITH NOCTURIA: ICD-10-CM

## 2018-02-21 PROCEDURE — 99214 OFFICE O/P EST MOD 30 MIN: CPT | Performed by: FAMILY MEDICINE

## 2018-02-21 RX ORDER — CHOLECALCIFEROL (VITAMIN D3) 50 MCG
2000 TABLET ORAL DAILY
Qty: 90 TABLET | Refills: 1 | Status: SHIPPED | OUTPATIENT
Start: 2018-02-21 | End: 2018-08-06 | Stop reason: HOSPADM

## 2018-02-21 RX ORDER — TAMSULOSIN HYDROCHLORIDE 0.4 MG/1
0.4 CAPSULE ORAL
Qty: 30 CAPSULE | Refills: 0 | Status: SHIPPED | OUTPATIENT
Start: 2018-02-21 | End: 2018-04-03 | Stop reason: SDUPTHER

## 2018-02-21 RX ORDER — INSULIN GLARGINE 100 [IU]/ML
20 INJECTION, SOLUTION SUBCUTANEOUS
Qty: 600 UNITS | Refills: 0 | Status: ON HOLD | OUTPATIENT
Start: 2018-02-21 | End: 2018-08-06

## 2018-02-21 RX ORDER — LISINOPRIL 20 MG/1
20 TABLET ORAL DAILY
Qty: 90 TABLET | Refills: 1 | Status: SHIPPED | OUTPATIENT
Start: 2018-02-21

## 2018-02-21 NOTE — PROGRESS NOTES
Assessment/Plan:    Diabetes mellitus, insulin dependent (IDDM), uncontrolled (HCC)  Decrease Lantus to 15 units at night  If FBG above 150 for 3 days increase Lantus 2 units if FBG below 70 decrease Lantus by 2 units  Problem List Items Addressed This Visit        Endocrine    Diabetes mellitus, insulin dependent (IDDM), uncontrolled (HCC)     Decrease Lantus to 15 units at night  If FBG above 150 for 3 days increase Lantus 2 units if FBG below 70 decrease Lantus by 2 units  Relevant Medications    Cholecalciferol (VITAMIN D) 2000 units tablet    insulin glargine (LANTUS) 100 units/mL subcutaneous injection    lisinopril (ZESTRIL) 20 mg tablet       Cardiovascular and Mediastinum    Hypertension    Relevant Medications    lisinopril (ZESTRIL) 20 mg tablet       Genitourinary    Benign prostatic hyperplasia with nocturia    Relevant Medications    tamsulosin (FLOMAX) 0 4 mg       Other    LUQ pain - Primary    Relevant Orders    US abdomen limited            Subjective:      Patient ID: Monika Mccarty is a 80 y o  male  79 yo  male with Parkinson Disease, Dementia, Dm, here today with 3 week history of diarrhea  As per his daughter, patient usually has constipation, however for aprox 3 weeks he had bright yellow liquid diarrhea, which stopped about 2 days ago  Yesterday and today he has had formed stools which look like birthday candles  Patient developed LUQ pain, mild not radiated, worse with BM, which improved with his diarrhea and started again today  Pain is described as pressure, accompanied by a bulging on his LUQ  The following portions of the patient's history were reviewed and updated as appropriate: allergies, past medical history and problem list     Review of Systems   Constitutional: Positive for appetite change and fatigue  HENT: Positive for congestion  Dry mouth   Eyes: Positive for discharge  Respiratory: Negative  Cardiovascular: Negative  Gastrointestinal:        As described in HPI   Musculoskeletal: Positive for arthralgias  Psychiatric/Behavioral: Positive for sleep disturbance  All other systems reviewed and are negative  Objective:      /68 (BP Location: Left arm, Patient Position: Sitting, Cuff Size: Standard)   Pulse 68   Temp (!) 96 1 °F (35 6 °C) (Tympanic)   Resp 18   Ht 5' (1 524 m)   Wt 53 1 kg (117 lb)   SpO2 95%   BMI 22 85 kg/m²          Physical Exam   Constitutional: He is oriented to person, place, and time  He appears well-developed  HENT:   Head: Normocephalic  Right Ear: External ear normal    Left Ear: External ear normal    Nose: Nose normal    Mouth/Throat: Oropharynx is clear and moist    Eyes: Conjunctivae and EOM are normal  Pupils are equal, round, and reactive to light  Neck: Normal range of motion  Neck supple  No thyromegaly present  Cardiovascular: Normal rate, regular rhythm and normal heart sounds  Pulmonary/Chest: Effort normal and breath sounds normal    Abdominal: Soft  Bowel sounds are normal  There is no tenderness  There is no rebound and no guarding  Musculoskeletal: Normal range of motion  Neurological: He is alert and oriented to person, place, and time  He has normal reflexes  Skin: Skin is dry  Psychiatric: He has a normal mood and affect  Nursing note and vitals reviewed

## 2018-02-21 NOTE — ASSESSMENT & PLAN NOTE
Decrease Lantus to 15 units at night  If FBG above 150 for 3 days increase Lantus 2 units if FBG below 70 decrease Lantus by 2 units

## 2018-03-05 ENCOUNTER — TELEPHONE (OUTPATIENT)
Dept: FAMILY MEDICINE CLINIC | Facility: CLINIC | Age: 83
End: 2018-03-05

## 2018-03-14 DIAGNOSIS — IMO0001 UNCONTROLLED DIABETES MELLITUS TYPE 2 WITHOUT COMPLICATIONS, UNSPECIFIED LONG TERM INSULIN USE STATUS: Primary | ICD-10-CM

## 2018-04-02 ENCOUNTER — TELEPHONE (OUTPATIENT)
Dept: NEUROLOGY | Facility: CLINIC | Age: 83
End: 2018-04-02

## 2018-04-02 NOTE — TELEPHONE ENCOUNTER
Pt's son Wilfred Malloy called to request letter for work  States he stayed home to help care for his father as the home health aid did not come today  If agreeable, Wilfred Malloy can  letter at [facility] office today       Wilfred Malloy [phone]

## 2018-04-02 NOTE — TELEPHONE ENCOUNTER
Letter generated  No need for signature  Paty Broussard made aware to  letter  Letter left at front  Correct cell # [phone]

## 2018-04-03 DIAGNOSIS — N40.1 BENIGN PROSTATIC HYPERPLASIA WITH NOCTURIA: ICD-10-CM

## 2018-04-03 DIAGNOSIS — R35.1 BENIGN PROSTATIC HYPERPLASIA WITH NOCTURIA: ICD-10-CM

## 2018-04-04 RX ORDER — TAMSULOSIN HYDROCHLORIDE 0.4 MG/1
0.4 CAPSULE ORAL
Qty: 30 CAPSULE | Refills: 0 | Status: SHIPPED | OUTPATIENT
Start: 2018-04-04 | End: 2018-04-27 | Stop reason: SDUPTHER

## 2018-04-25 ENCOUNTER — OFFICE VISIT (OUTPATIENT)
Dept: NEUROLOGY | Facility: CLINIC | Age: 83
End: 2018-04-25
Payer: COMMERCIAL

## 2018-04-25 VITALS
RESPIRATION RATE: 14 BRPM | HEART RATE: 78 BPM | BODY MASS INDEX: 21.6 KG/M2 | DIASTOLIC BLOOD PRESSURE: 62 MMHG | HEIGHT: 60 IN | WEIGHT: 110 LBS | SYSTOLIC BLOOD PRESSURE: 128 MMHG

## 2018-04-25 DIAGNOSIS — G89.29 CHRONIC MIDLINE LOW BACK PAIN, WITH SCIATICA PRESENCE UNSPECIFIED: ICD-10-CM

## 2018-04-25 DIAGNOSIS — M54.5 CHRONIC MIDLINE LOW BACK PAIN, WITH SCIATICA PRESENCE UNSPECIFIED: ICD-10-CM

## 2018-04-25 DIAGNOSIS — R25.1 TREMOR: ICD-10-CM

## 2018-04-25 DIAGNOSIS — F02.80 DEMENTIA DUE TO PARKINSON'S DISEASE WITHOUT BEHAVIORAL DISTURBANCE (HCC): ICD-10-CM

## 2018-04-25 DIAGNOSIS — G20 DEMENTIA DUE TO PARKINSON'S DISEASE WITHOUT BEHAVIORAL DISTURBANCE (HCC): ICD-10-CM

## 2018-04-25 DIAGNOSIS — R26.0 STAGGERING GAIT: ICD-10-CM

## 2018-04-25 DIAGNOSIS — G20 PARKINSON'S DISEASE (HCC): Primary | ICD-10-CM

## 2018-04-25 PROBLEM — R42 DIZZINESS AND GIDDINESS: Status: ACTIVE | Noted: 2018-04-25

## 2018-04-25 PROBLEM — M54.50 CHRONIC MIDLINE LOW BACK PAIN: Status: ACTIVE | Noted: 2018-04-25

## 2018-04-25 PROCEDURE — 99214 OFFICE O/P EST MOD 30 MIN: CPT | Performed by: PSYCHIATRY & NEUROLOGY

## 2018-04-25 RX ORDER — AMLODIPINE BESYLATE 10 MG/1
TABLET ORAL
COMMUNITY
Start: 2017-10-18 | End: 2018-05-14 | Stop reason: SDUPTHER

## 2018-04-25 RX ORDER — CITALOPRAM 20 MG/1
20 TABLET ORAL DAILY
Qty: 30 TABLET | Refills: 5 | Status: SHIPPED | OUTPATIENT
Start: 2018-04-25

## 2018-04-25 RX ORDER — AMANTADINE HYDROCHLORIDE 100 MG/1
100 CAPSULE, GELATIN COATED ORAL 2 TIMES DAILY
Qty: 60 CAPSULE | Refills: 5 | Status: SHIPPED | OUTPATIENT
Start: 2018-04-25

## 2018-04-25 RX ORDER — NICOTINE POLACRILEX 4 MG/1
1 GUM, CHEWING ORAL DAILY
COMMUNITY
Start: 2010-12-13 | End: 2018-04-25 | Stop reason: SDUPTHER

## 2018-04-25 RX ORDER — ACETAMINOPHEN 160 MG
TABLET,DISINTEGRATING ORAL
Refills: 1 | COMMUNITY
Start: 2018-02-21 | End: 2018-07-20 | Stop reason: SDUPTHER

## 2018-04-25 RX ORDER — DONEPEZIL HYDROCHLORIDE 5 MG/1
TABLET, FILM COATED ORAL
COMMUNITY
End: 2018-04-25 | Stop reason: SDUPTHER

## 2018-04-25 RX ORDER — DONEPEZIL HYDROCHLORIDE 5 MG/1
5 TABLET, FILM COATED ORAL DAILY
Qty: 30 TABLET | Refills: 5 | Status: SHIPPED | OUTPATIENT
Start: 2018-04-25 | End: 2018-07-14 | Stop reason: SDUPTHER

## 2018-04-25 RX ORDER — OLANZAPINE 20 MG/1
20 TABLET ORAL
Qty: 30 TABLET | Refills: 5 | Status: SHIPPED | OUTPATIENT
Start: 2018-04-25

## 2018-04-25 NOTE — PROGRESS NOTES
Patient ID: Augustine Garg is a 80 y o  male  Assessment/Plan:    Parkinson's disease ([facility] )  He is on Rytary 61 25 2, 2 2 3 at night    Mild halluncinations, non threatening      Change Rytary to 2 at 8, 1 10 am , 1  at noon , 1 at2 pm , 1 at 4 ,  2 at 6pm , 1 8 pm      falls may be from freezing , we did discuss progressive nature of PD   Will add amantadine in 2 weeks  For PD     Refilled scripts     Chronic midline low back pain  Tylenol , aspercreame   To back , heating pad     Tremor  Controlled     Dizziness and giddiness  Multifactorial , asked Eden Po to check Blood pressure , monitor if change with change in rytary     She asked about a letter for changing bathtub , she will sending paper work with address and we can write / send to her   Dementia  Stable , will keep on aricept 5mg , higher dose of aricept  can provoke nightmares that we like to avoid , can already occur with PD        Diagnoses and all orders for this visit:    Parkinson's disease ([facility] )  -     amantadine (SYMMETREL) 100 mg capsule; Take 1 capsule (100 mg total) by mouth 2 (two) times a day  -     Carbidopa-Levodopa ER (RYTARY) 61  MG CPCR; 1 po 9 times a day    Tremor  -     Carbidopa-Levodopa ER (RYTARY) 61  MG CPCR; 1 po 9 times a day    Staggering gait    Chronic midline low back pain, with sciatica presence unspecified    Dementia due to Parkinson's disease without behavioral disturbance (HCC)  -     OLANZapine (ZyPREXA) 20 MG tablet; Take 1 tablet (20 mg total) by mouth daily at bedtime  -     citalopram (CeleXA) 20 mg tablet; Take 1 tablet (20 mg total) by mouth daily  -     donepezil (ARICEPT) 5 mg tablet; Take 1 tablet (5 mg total) by mouth daily    Other orders  -     amLODIPine (NORVASC) 10 mg tablet; TAKE 1 TABLET DAILY    -     Cholecalciferol (VITAMIN D3) 2000 units capsule; TAKE 1 TABLET (2,000 UNITS TOTAL) BY MOUTH DAILY  -     Discontinue: donepezil (ARICEPT) 5 mg tablet; TAKE 1 TABLET BY MOUTH EVERY DAY  - Nutritional Supplements (GLUCERNA ADVANCE SHAKE PO); Take by mouth  -     Discontinue: Omeprazole 20 MG TBEC; Take 1 capsule by mouth daily         Subjective: This is an 80year-old patient who 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  He is currently utilizing Aggrenox twice a day with no side effects  He does have some ongoing short-term memory loss and Parkinsons disease   Linda Cutting He is on Rytary 61 25 2,2,2 and 3  at 8;15 pm   he is stable during the day but reports episodes of freezing at night  with sudden falls   He also falls in the middle of the day and night   Often Adriana Hicks will find him on the floor in the middle of the night   He was recently started on Aricept 5mg   He is no longer on exelon  He admits to feeling weak and then suddenly falling   Home pt only helped for short period of time       on Zyprexa 20 mg daily controlled the halluncinations  Prior lower doses have proven to be less effective   comptan or da agonist both caused hallucinations           He does have stiffness in the morning  Using play dough and moving his hands does help  Music does help the PD ambulation      Other symptoms include dizziness intermittently , intermittent diarrhea           Other symptoms include back pain, it occurs daily  But he has a curved posture   aspercreme did help     Today Adriana Hicks did accompnay him     Seen at 2;20 , last dose of rytary was at 12;33                 The following portions of the patient's history were reviewed and updated as appropriate:   He  has a past medical history of Dementia; Diabetes mellitus, type 2 ([facility] ); Hypertension; Iron deficiency; Parkinson's disease ([facility] ); Pneumonia; Stroke [facility]); and Vitamin D deficiency  He  has a past surgical history that includes Hallux valgus correction  His family history includes Depression in his family; Diabetes in his family; Heart attack in his family; Hypertension in his family; Pancreatic cancer in his family    He  reports that he has never smoked  He has never used smokeless tobacco  He reports that he does not drink alcohol or use drugs  Current Outpatient Prescriptions   Medication Sig Dispense Refill    amLODIPine (NORVASC) 10 mg tablet TAKE 1 TABLET DAILY   aspirin-dipyridamole (AGGRENOX)  mg per 12 hr capsule Take 1 capsule by mouth 2 (two) times a day      Carbidopa-Levodopa ER (RYTARY) 61  MG CPCR 1 po 9 times a day 270 capsule 3    Cholecalciferol (VITAMIN D3) 2000 units capsule TAKE 1 TABLET (2,000 UNITS TOTAL) BY MOUTH DAILY  1    citalopram (CeleXA) 20 mg tablet Take 1 tablet (20 mg total) by mouth daily 30 tablet 5    donepezil (ARICEPT) 5 mg tablet Take 1 tablet (5 mg total) by mouth daily 30 tablet 5    insulin glargine (LANTUS) 100 units/mL subcutaneous injection Inject 20 Units under the skin daily at bedtime 600 Units 0    Insulin Pen Needle (NOVOFINE PLUS) 32G X 4 MM MISC by Does not apply route daily Use 1 daily 100 each 3    Nutritional Supplements (GLUCERNA ADVANCE SHAKE PO) Take by mouth      OLANZapine (ZyPREXA) 20 MG tablet Take 1 tablet (20 mg total) by mouth daily at bedtime 30 tablet 5    omeprazole (PriLOSEC) 20 mg delayed release capsule Take 20 mg by mouth daily      tamsulosin (FLOMAX) 0 4 mg Take 1 capsule (0 4 mg total) by mouth daily with dinner 30 capsule 0    amantadine (SYMMETREL) 100 mg capsule Take 1 capsule (100 mg total) by mouth 2 (two) times a day 60 capsule 5    Cholecalciferol (VITAMIN D) 2000 units tablet Take 1 tablet (2,000 Units total) by mouth daily 90 tablet 1    lisinopril (ZESTRIL) 20 mg tablet Take 1 tablet (20 mg total) by mouth daily 90 tablet 1    loratadine (CLARITIN) 10 mg tablet Take 10 mg by mouth daily      ondansetron (ZOFRAN) 4 mg tablet Take 1 tablet by mouth every 12 (twelve) hours as needed for nausea or vomiting 30 tablet 1     No current facility-administered medications for this visit        He is allergic to mirapex [pramipexole]            Objective:    Blood pressure 128/62, pulse 78, resp  rate 14, height 5' (1 524 m), weight 49 9 kg (110 lb)  Physical Exam   Constitutional: He appears well-developed  HENT:   Head: Normocephalic  Eyes: EOM are normal  Pupils are equal, round, and reactive to light  Cardiovascular: Normal rate  Neurological Exam    Mental Status  The patient is alert and oriented to person, place, time, and situation  He has normal attention span and concentration  Palmomental signs , [place]ian speaking      Cranial Nerves    CN II: The patient's visual acuity and visual fields are normal   CN III, IV, VI: The patient's pupils are equally round and reactive to light and ocular movements are normal   CN V: The patient has normal facial sensation  CN VII:  The patient has lower right-sided facial weakness  CN VIII:  The patient's hearing is normal   CN IX, X: The patient has symmetric palate movement and normal gag reflex  CN XI: The patient's shoulder shrug strength is normal   CN XII: The patient's tongue is midline without atrophy or fasciculations  Motor  The patient has increased muscle bulk throughout  stooped posture , no cogwheeling , no tremors      Sensory  The patient's sensation is to light touch and pinprick  Reflexes  He has glabellar tap, palmomental and snout release signs present  Trace in ue/      Gait and Coordination   He has a wide stance  He has a shortened and shuffling stride  Shuffling gait, requires 2 to stand                 ROS:    Review of Systems   Constitutional: Positive for appetite change  Negative for fever  HENT: Negative  Negative for hearing loss, tinnitus, trouble swallowing and voice change  Eyes: Negative  Negative for photophobia and pain  Respiratory: Negative  Negative for shortness of breath  Cardiovascular: Negative  Negative for palpitations  Gastrointestinal: Negative  Negative for nausea and vomiting     Endocrine: Negative  Negative for cold intolerance and heat intolerance  Genitourinary: Negative  Negative for dysuria, frequency and urgency  Musculoskeletal: Positive for back pain  Negative for myalgias and neck pain  Skin: Negative  Negative for rash  Neurological: Positive for tremors and weakness  Negative for dizziness, seizures, syncope, facial asymmetry, speech difficulty, light-headedness, numbness and headaches  Hematological: Negative  Does not bruise/bleed easily  Psychiatric/Behavioral: Negative  Negative for confusion, hallucinations and sleep disturbance

## 2018-04-25 NOTE — ASSESSMENT & PLAN NOTE
Stable , will keep on aricept 5mg , higher dose of aricept  can provoke nightmares that we like to avoid , can already occur with PD

## 2018-04-25 NOTE — ASSESSMENT & PLAN NOTE
Multifactorial , asked Myesha Chambers to check Blood pressure , monitor if change with change in rytary     She asked about a letter for changing bathtub , she will sending paper work with address and we can write / send to her

## 2018-04-25 NOTE — ASSESSMENT & PLAN NOTE
He is on Rytary 61 25 2, 2 2 3 at night    Mild halluncinations, non threatening      Change Rytary to 2 at 8, 1 10 am , 1  at noon , 1 at2 pm , 1 at 4 ,  2 at 6pm , 1 8 pm      falls may be from freezing , we did discuss progressive nature of PD   Will add amantadine in 2 weeks  For PD     Refilled scripts

## 2018-04-25 NOTE — PATIENT INSTRUCTIONS
Change  Rytary 2 at 8, 1 at10 , 1 noon , 1 2  Pm  , 1 at 4pm  ,  1 at 6pm , 2 8         Try amantadine in 2 weeks

## 2018-04-27 DIAGNOSIS — N40.1 BENIGN PROSTATIC HYPERPLASIA WITH NOCTURIA: ICD-10-CM

## 2018-04-27 DIAGNOSIS — R35.1 BENIGN PROSTATIC HYPERPLASIA WITH NOCTURIA: ICD-10-CM

## 2018-04-30 RX ORDER — TAMSULOSIN HYDROCHLORIDE 0.4 MG/1
0.4 CAPSULE ORAL
Qty: 30 CAPSULE | Refills: 0 | Status: SHIPPED | OUTPATIENT
Start: 2018-04-30 | End: 2018-05-17 | Stop reason: SDUPTHER

## 2018-05-09 ENCOUNTER — TELEPHONE (OUTPATIENT)
Dept: NEUROLOGY | Facility: CLINIC | Age: 83
End: 2018-05-09

## 2018-05-09 NOTE — TELEPHONE ENCOUNTER
Patient's daughter requesting PT/OT for patient  She states she has had increased difficulty getting him to transfer in the bathroom  "He's going into the fetal position when he is supposed to strand" She stated she was forcing him to stand and he's not happy about it  Patient is "hunching over when he sits" Denies any other sxs  She thinks he would benefit from some therapy  She is requesting we call her back with answer

## 2018-05-10 DIAGNOSIS — Z86.73 HISTORY OF CVA (CEREBROVASCULAR ACCIDENT): Primary | ICD-10-CM

## 2018-05-10 DIAGNOSIS — G20 PARKINSON'S DISEASE (HCC): ICD-10-CM

## 2018-05-10 DIAGNOSIS — G60.9 IDIOPATHIC PERIPHERAL NEUROPATHY: ICD-10-CM

## 2018-05-10 NOTE — TELEPHONE ENCOUNTER
Dr F[facility],    When you order services you would just need to pick the home care script, it will then make you complete the face to face form right in the computer now because of epic and we won't need to use the hand written form anymore  It just has you click a few options nothing long gets written anymore  Please let me know if you need help, would be more then glad

## 2018-05-10 NOTE — TELEPHONE ENCOUNTER
Yes he needs home ot/ pt , he is homebound     Will order it   There is usually a from that is needed for homebound pts    Please check with SW if it is needed      please see if he had any  Difference  on the med changes we made at last appointment

## 2018-05-10 NOTE — TELEPHONE ENCOUNTER
Can I use the last appointment as the face to face or does he need another appointment   it did not note in the last note that he was home bound    He can see one of the AP if he needs a more recent appointment

## 2018-05-11 NOTE — TELEPHONE ENCOUNTER
Dr Vik Milligan, I inquired about this  As long as the home bound criteria is met and at least documented on the face to face for the ambulatory home health care he will qualify  You can do the order as he's still within the 30 day period and his should  will be sufficient  [facility] will call me if for some reason it's not after reviewing

## 2018-05-12 DIAGNOSIS — Z79.4 TYPE 2 DIABETES MELLITUS WITHOUT COMPLICATION, WITH LONG-TERM CURRENT USE OF INSULIN (HCC): Primary | ICD-10-CM

## 2018-05-12 DIAGNOSIS — E11.9 TYPE 2 DIABETES MELLITUS WITHOUT COMPLICATION, WITH LONG-TERM CURRENT USE OF INSULIN (HCC): Primary | ICD-10-CM

## 2018-05-14 DIAGNOSIS — I10 BENIGN ESSENTIAL HYPERTENSION: Primary | ICD-10-CM

## 2018-05-14 RX ORDER — BLOOD-GLUCOSE METER
KIT MISCELLANEOUS
Qty: 100 EACH | Refills: 5 | Status: SHIPPED | OUTPATIENT
Start: 2018-05-14

## 2018-05-15 RX ORDER — AMLODIPINE BESYLATE 10 MG/1
10 TABLET ORAL DAILY
Qty: 30 TABLET | Refills: 3 | Status: SHIPPED | OUTPATIENT
Start: 2018-05-15

## 2018-05-16 NOTE — TELEPHONE ENCOUNTER
MSW was asked by Dr Vik Milligan to assist with home health care order, as she felt that she had already placed same  MSW spoke with Rg Rankin to make her aware that referral was placed, but it was under "Amubulatory Referral to PT" not under "Ambulatory Referral to [address] [place] [address] José Antonio Tomas"  Alba Farias was willing to accept order as a verbal ,as long as this writer could provide a Face to Face  Dr Vik Milligan signed Face to Face  Call placed to patient's daughter to confirm that [facility] is the agency she prefers  MSW left message requesting callback  Awaiting same

## 2018-05-17 DIAGNOSIS — N40.1 BENIGN PROSTATIC HYPERPLASIA WITH NOCTURIA: ICD-10-CM

## 2018-05-17 DIAGNOSIS — R35.1 BENIGN PROSTATIC HYPERPLASIA WITH NOCTURIA: ICD-10-CM

## 2018-05-17 RX ORDER — TAMSULOSIN HYDROCHLORIDE 0.4 MG/1
0.4 CAPSULE ORAL
Qty: 30 CAPSULE | Refills: 0 | Status: SHIPPED | OUTPATIENT
Start: 2018-05-17 | End: 2018-06-15 | Stop reason: SDUPTHER

## 2018-05-17 NOTE — TELEPHONE ENCOUNTER
MSW received callback from patient's daughter, Magali Kearns, this date  MSW offered choice of home health care agency  Magali Kearns was agreeable to [facility]  MSW made referral to Brianda Farfan at [address] advised that she would be able to obtain all demographics, neuro office notes, and order from [facility]  MSW completed Face to Face and faxed form to [phone] this date (attention Brianda Farfan)  Patient's daughter aware of the 5-7 day delay and is agreeable to proceed with referral  Patient's daughter is aware to anticipate call from [address] to schedule the evaluations in a few days

## 2018-05-21 ENCOUNTER — TELEPHONE (OUTPATIENT)
Dept: NEUROLOGY | Facility: CLINIC | Age: 83
End: 2018-05-21

## 2018-05-21 ENCOUNTER — DOCUMENTATION (OUTPATIENT)
Dept: NEUROLOGY | Facility: CLINIC | Age: 83
End: 2018-05-21

## 2018-05-29 ENCOUNTER — TELEPHONE (OUTPATIENT)
Dept: NEUROLOGY | Facility: CLINIC | Age: 83
End: 2018-05-29

## 2018-05-29 NOTE — TELEPHONE ENCOUNTER
[facility] called and lmom stating that they did a medication review and found a level 2 drug interaction between donepezil and citalopram   They need to report any level 2 interactions  She is requesting a call back saying that  is aware      [phone]

## 2018-05-29 NOTE — TELEPHONE ENCOUNTER
Please find what a level 2 interaction means ? I am not familiar with this terminology?  If the nurse does not know , please check with the pharmacist ?

## 2018-05-31 NOTE — TELEPHONE ENCOUNTER
LMOM for [facility] [phone] to call back to clarify  level 2 drug interaction between donepezil and citalopram

## 2018-05-31 NOTE — TELEPHONE ENCOUNTER
SANGEETA called back  she is not a nurse and does not know  it does not say what say what the interaction is, it just gives a level  i called the pharm to clarify    potential for QT prolongation

## 2018-06-04 ENCOUNTER — TELEPHONE (OUTPATIENT)
Dept: FAMILY MEDICINE CLINIC | Facility: CLINIC | Age: 83
End: 2018-06-04

## 2018-06-04 NOTE — TELEPHONE ENCOUNTER
Adiel Dumont from [facility] called to report that one of their aids found the pt on the floor, he had soiled himself  She helped him up cleaned him off and he did not complain of any pain and did not have any scrapes or bruising

## 2018-06-07 NOTE — TELEPHONE ENCOUNTER
EDWARDOM @[facility], original lsg states that they needed a msg stating that  is aware of below, I did relay this info in vm  No further action needed

## 2018-06-12 ENCOUNTER — TELEPHONE (OUTPATIENT)
Dept: NEUROLOGY | Facility: CLINIC | Age: 83
End: 2018-06-12

## 2018-06-12 NOTE — TELEPHONE ENCOUNTER
Patient's daughter states they are having issues getting patient in an out of bed  Asking for an order for a hospital bed      [phone]

## 2018-06-13 ENCOUNTER — TELEPHONE (OUTPATIENT)
Dept: FAMILY MEDICINE CLINIC | Facility: CLINIC | Age: 83
End: 2018-06-13

## 2018-06-13 DIAGNOSIS — R39.15 URGENCY OF URINATION: Primary | ICD-10-CM

## 2018-06-13 RX ORDER — UNDERPADS 23" X 36"
EACH MISCELLANEOUS 2 TIMES DAILY
Qty: 60 EACH | Refills: 11 | Status: SHIPPED | OUTPATIENT
Start: 2018-06-13 | End: 2018-06-22 | Stop reason: SDUPTHER

## 2018-06-13 RX ORDER — DIAPER,BRIEF,ADULT, DISPOSABLE
EACH MISCELLANEOUS 4 TIMES DAILY
Qty: 120 EACH | Refills: 11 | Status: SHIPPED | OUTPATIENT
Start: 2018-06-13

## 2018-06-13 RX ORDER — UNDERPADS 23" X 36"
EACH MISCELLANEOUS WEEKLY
Qty: 4 EACH | Refills: 11 | Status: SHIPPED | OUTPATIENT
Start: 2018-06-13

## 2018-06-14 ENCOUNTER — TELEPHONE (OUTPATIENT)
Dept: FAMILY MEDICINE CLINIC | Facility: CLINIC | Age: 83
End: 2018-06-14

## 2018-06-14 NOTE — TELEPHONE ENCOUNTER
He can see one of the AP   Chip Salmeron in [facility] in [place]     Please let Samira De La Garza know that he requires a appointment with one of the AP to discuss this and document the above information    If she is willing , can make an appointment with one of the above

## 2018-06-15 DIAGNOSIS — N40.1 BENIGN PROSTATIC HYPERPLASIA WITH NOCTURIA: ICD-10-CM

## 2018-06-15 DIAGNOSIS — R35.1 BENIGN PROSTATIC HYPERPLASIA WITH NOCTURIA: ICD-10-CM

## 2018-06-15 NOTE — TELEPHONE ENCOUNTER
[facility] IS CALLING STATING PT SPOKE TO PT PHARMACY ([facility]) STATING PT HASN'T PICKED UP HIS REFILLS  Dioni Aguilera FEELS LIKE PT IS BEING NONCOMPLIANCE

## 2018-06-18 RX ORDER — TAMSULOSIN HYDROCHLORIDE 0.4 MG/1
0.4 CAPSULE ORAL
Qty: 30 CAPSULE | Refills: 0 | Status: SHIPPED | OUTPATIENT
Start: 2018-06-18 | End: 2018-07-05 | Stop reason: SDUPTHER

## 2018-06-18 NOTE — TELEPHONE ENCOUNTER
Lamine Hidalgo from [facility] asking for status of hospital bed  Made her aware that the office has called the daughter to schedule an appointment as discussed below  Will contact the daughter to have her call to schedule an appointment

## 2018-06-22 ENCOUNTER — OFFICE VISIT (OUTPATIENT)
Dept: NEUROLOGY | Facility: CLINIC | Age: 83
End: 2018-06-22
Payer: COMMERCIAL

## 2018-06-22 VITALS
HEART RATE: 72 BPM | BODY MASS INDEX: 21.6 KG/M2 | SYSTOLIC BLOOD PRESSURE: 126 MMHG | WEIGHT: 110 LBS | DIASTOLIC BLOOD PRESSURE: 59 MMHG | HEIGHT: 60 IN

## 2018-06-22 DIAGNOSIS — R26.2 AMBULATORY DYSFUNCTION: ICD-10-CM

## 2018-06-22 DIAGNOSIS — R10.30 LOWER ABDOMINAL PAIN: ICD-10-CM

## 2018-06-22 DIAGNOSIS — G20 PARKINSON'S DISEASE (HCC): Primary | ICD-10-CM

## 2018-06-22 PROCEDURE — 99214 OFFICE O/P EST MOD 30 MIN: CPT | Performed by: NURSE PRACTITIONER

## 2018-06-22 NOTE — PROGRESS NOTES
Patient ID: Yamileth Kelley is a 80 y o  male  Assessment/Plan:    Parkinson's disease ([facility] )  With continued progression which is affecting his strength and endurance  Due to the Parkinson's and overall deconditioning, he requires a hospital bed to aid with positioning of the body in ways not feasible in an ordinary bed  He will also require bed rails for positioning, as well as a gel overlay or some sort of mattress pad due to chronic low back pain and decreased body/muscle mass which will predispose him to skin breakdown  He will also require a lift chair to assist with getting him out of the chair and up for ambulation with a walker  He has decreased strength which does not allow him to get up out of a chair without maximal assistance currently  He will continue on the rytary as currently prescribed by Dr Patria Etienne and they will call with any new or worsening symptoms             Lower abdominal pain  He continued to complain of lower abdominal pain during our visit today, along with overall decreased appetite and nausea  Brynn Springer also notes that he has diarrhea for some time, which is unusual as he is usually constipated  On exam, he has tenderness in the RUQ and RLQ with some rigidity and guarding  His overall bowel sounds are decreased  I have recommended that they go to urgent care for prompt KUB and evaluation, to rule out bowel obstruction  Subjective:    Yamileth Kelley is an 80year-old patient who 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  He is currently utilizing Aggrenox twice a day with no side effects  He does have some ongoing short-term memory loss as well as Parkinson's disease  At his last visit with Dr Patria Etienne he was noting some increased freezing and falling  His Rytary was changed to 2 at 8, 1 at 10 am , 1  at noon , 1 at 2 pm , 1 at 4pm ,  2 at 6pm , and 1 at 8 pm  Starting amantadine was also discussed  For dementia, he remained on aricept    He continued on Zyprexa 20 mg daily for mild halluncinations  Prior lower doses have proven to be less effective  Comtan and dopamine agonists both caused worse hallucinations in the past       Today he presents for evaluation for a hospital bed, accompanied by Alonso Wilkinson who is providing most of the history and is also translating as Luis Vasquez speaks [place]  She states that since their last visit, she decided not to start the amantadine as she was concerned about side effects  Luis Vasquez continues to have difficulty with positioning in bed, as well as difficulty getting in and out of bed due to his Parkinson's and extreme deconditioning  They are also requesting a lift chair, as he has a difficult time getting out of the chair to get up and walk  He is currently getting PT at home  He continues to have falls at home, worse at night  He is also often found on the floor in the middle of the night  He is complaining of lower abdominal pain today and Alonso Wilkinson is noting that he has had diarrhea for a few weeks as well  He feels nauseous and has a decreased appetite  He states the pain starts down low and moves up  It is worse with sitting  The following portions of the patient's history were reviewed and updated as appropriate: past family history, past medical history, past social history and past surgical history  Objective:    Blood pressure 126/59, pulse 72, height 5' (1 524 m), weight 49 9 kg (110 lb)  Physical Exam   Constitutional: He appears well-developed  Thin, frail   HENT:   Head: Normocephalic  Eyes: EOM are normal  Pupils are equal, round, and reactive to light  Abdominal: There is tenderness  Overall decreased bowel sounds     Psychiatric: He has a normal mood and affect  His speech is normal and behavior is normal        Neurological Exam    Mental Status  The patient is alert  His speech is normal  He has normal attention span and concentration  He follows multi-step commands   He has a normal fund of knowledge  Cranial Nerves    CN II: The patient's visual acuity and visual fields are normal   CN III, IV, VI: The patient's pupils are equally round and reactive to light and ocular movements are normal   CN V: The patient has normal facial sensation  CN VII:  The patient has symmetric facial movement  CN VIII:  The patient's hearing is normal   CN IX, X: The patient has symmetric palate movement and normal gag reflex  CN XI: The patient's shoulder shrug strength is normal   CN XII: The patient's tongue is midline without atrophy or fasciculations  Motor    Moderate hypomimia and hypophonia  No rest tremor  Generalized bradykinesia     Gait and Coordination    Needs assistance to get out of the chair  Stooped posture with knees bent  +shuffling  Requires assistance to ambulate  Pull test deferred         ROS:    Review of Systems   Constitutional: Positive for appetite change  HENT: Negative  Eyes: Negative  Respiratory: Positive for shortness of breath  Cardiovascular: Negative  Gastrointestinal: Positive for diarrhea  Endocrine: Negative  Genitourinary: Negative  Musculoskeletal: Negative  Skin: Negative  Allergic/Immunologic: Negative  Neurological: Positive for dizziness, speech difficulty and light-headedness  Hematological: Bruises/bleeds easily  Psychiatric/Behavioral: Positive for hallucinations and sleep disturbance (Trouble falling asleep)  The patient is nervous/anxious

## 2018-06-22 NOTE — ASSESSMENT & PLAN NOTE
He continued to complain of lower abdominal pain during our visit today, along with overall decreased appetite and nausea  Alba Pritchard also notes that he has diarrhea for some time, which is unusual as he is usually constipated  On exam, he has tenderness in the RUQ and RLQ with some rigidity and guarding  His overall bowel sounds are decreased  I have recommended that they go to urgent care for prompt KUB and evaluation, to rule out bowel obstruction

## 2018-06-22 NOTE — TELEPHONE ENCOUNTER
MSW met with patient, daughter Phillip Ceballos, and [facility] provided needed documentation for hospital bed  Patient's daughter inquired about the possibility of a lift chair for patient  MSW advised that patient/family would need to pay for the entire cost of the chair (could range from $600-1000+), and that the insurance would only be able to reimburse for the mechanical apparatus which would be ~$250-300  MSW also advised that the insurance would only cover if the patient was using the chair to be able to get up and ambulate, not just be transferred into a wheelchair  Patient's daughter stated that they do not have the additional money for the lift chair at this time  MSW offered choice of DME provider for hospital bed  Patient's daughter selected [employer] [facility] as patient already has DME from that company  MSW phoned Jeanine at [employer] [facility] [phone]  Jeanine confirmed that they do carry hospital beds, can get gel overlays, and [facility]  Jeanine advised that she will need the patient demographics, neuro office visit note, and script (needs to have NPI/be signed by MD/DO Aden peters for DO to cosign order entered by SANCHEZ) to be faxed to [phone]  Jeanine did advise that the bed is a rental for 12 months, and then after 13 months (if the bed is still need), patient owns the bed  Benjamine Cancel, provided script  Script will require Dr Lilliana Ortiz signature before referral can be sent  MSW left a message for patient's daughter informing her of same  Referral will be submitted once script is co-signed

## 2018-06-22 NOTE — ASSESSMENT & PLAN NOTE
With continued progression which is affecting his strength and endurance  Due to the Parkinson's and overall deconditioning, he requires a hospital bed to aid with positioning of the body in ways not feasible in an ordinary bed  He will also require bed rails for positioning, as well as a gel overlay or some sort of mattress pad due to chronic low back pain and decreased body/muscle mass which will predispose him to skin breakdown  He will also require a lift chair to assist with getting him out of the chair and up for ambulation with a walker  He has decreased strength which does not allow him to get up out of a chair without maximal assistance currently    He will continue on the rytary as currently prescribed by Dr Konstantin Beckford and they will call with any new or worsening symptoms

## 2018-06-25 NOTE — TELEPHONE ENCOUNTER
Dr Anabela Brothers co-signed script for hospital bed  MSW faxed referral to [facility] this date (patient demographics, neuro office note, and script) to [phone]  Successful fax notice received  Patient's daughter aware that referral was faxed this date

## 2018-06-29 NOTE — TELEPHONE ENCOUNTER
Physician order form received/signed  Faxed to [address] [place] [email or web address] [facility] ([phone])

## 2018-07-05 DIAGNOSIS — R35.1 BENIGN PROSTATIC HYPERPLASIA WITH NOCTURIA: ICD-10-CM

## 2018-07-05 DIAGNOSIS — N40.1 BENIGN PROSTATIC HYPERPLASIA WITH NOCTURIA: ICD-10-CM

## 2018-07-06 RX ORDER — TAMSULOSIN HYDROCHLORIDE 0.4 MG/1
0.4 CAPSULE ORAL
Qty: 30 CAPSULE | Refills: 0 | Status: ON HOLD | OUTPATIENT
Start: 2018-07-06 | End: 2018-08-06

## 2018-07-14 DIAGNOSIS — F02.80 DEMENTIA DUE TO PARKINSON'S DISEASE WITHOUT BEHAVIORAL DISTURBANCE (HCC): ICD-10-CM

## 2018-07-14 DIAGNOSIS — G20 DEMENTIA DUE TO PARKINSON'S DISEASE WITHOUT BEHAVIORAL DISTURBANCE (HCC): ICD-10-CM

## 2018-07-16 RX ORDER — DONEPEZIL HYDROCHLORIDE 5 MG/1
TABLET, FILM COATED ORAL
Qty: 30 TABLET | Refills: 5 | Status: SHIPPED | OUTPATIENT
Start: 2018-07-16 | End: 2018-08-06 | Stop reason: HOSPADM

## 2018-07-20 ENCOUNTER — OFFICE VISIT (OUTPATIENT)
Dept: NEUROLOGY | Facility: CLINIC | Age: 83
End: 2018-07-20
Payer: COMMERCIAL

## 2018-07-20 VITALS
RESPIRATION RATE: 12 BRPM | SYSTOLIC BLOOD PRESSURE: 90 MMHG | WEIGHT: 111 LBS | DIASTOLIC BLOOD PRESSURE: 60 MMHG | HEART RATE: 70 BPM | HEIGHT: 60 IN | BODY MASS INDEX: 21.79 KG/M2

## 2018-07-20 DIAGNOSIS — F02.81 ALZHEIMER'S DISEASE OF OTHER ONSET WITH BEHAVIORAL DISTURBANCE: ICD-10-CM

## 2018-07-20 DIAGNOSIS — G20 PARKINSON'S DISEASE (HCC): ICD-10-CM

## 2018-07-20 DIAGNOSIS — G30.8 ALZHEIMER'S DISEASE OF OTHER ONSET WITH BEHAVIORAL DISTURBANCE: ICD-10-CM

## 2018-07-20 DIAGNOSIS — R25.1 TREMOR: ICD-10-CM

## 2018-07-20 DIAGNOSIS — R42 DIZZINESS AND GIDDINESS: Primary | ICD-10-CM

## 2018-07-20 DIAGNOSIS — Z86.73 HISTORY OF CVA (CEREBROVASCULAR ACCIDENT): ICD-10-CM

## 2018-07-20 PROCEDURE — 4040F PNEUMOC VAC/ADMIN/RCVD: CPT | Performed by: PSYCHIATRY & NEUROLOGY

## 2018-07-20 PROCEDURE — 99214 OFFICE O/P EST MOD 30 MIN: CPT | Performed by: PSYCHIATRY & NEUROLOGY

## 2018-07-20 NOTE — PROGRESS NOTES
Patient ID: Diane Zambrano is a 80 y o  male  Assessment/Plan:    Parkinson's disease ([facility] [address] )  He is on Rytary 61 25 2, 2 2 3 at night     No  halluncinations       Rytary to  continue 2 at 8, 1 10 am , 1  at noon , 1 at2 pm , 1 at 4 ,  2 at 6pm , 1 8 pm      Less freeezing       For PD add amatadine 100mg 1/2 tablet in morign for 1 week then 1/ 2 twice  A day    Before 5 pm , can not give afer 5pm  Due to side effects of insomnia           Can wait 30 mins after amantadine for rytary      Dizziness and giddiness  Her multifactorial dizziness this may be more severe one within 1 hour of utilizing rytary    Exam failed to reveal any evidence of orthostatic hypotension  I examined his ears and there was excessive amount of cerumen in there  This could also lead to dizziness and vertiginous type symptoms  Paulina Norman  will attempt to address these issues    History of CVA (cerebrovascular accident)  On aggrenox, continue     Dementia  On Aricept 5 mg a day  We did discuss the use of higher doses  but I would like to hold off  due to the potential of nightmares  If  he is able to tolerate the amantadine then we can attempt a higher dose of Aricept to 10 mg  He is still able to recognize his family,  Friends,  and [place]  Diagnoses and all orders for this visit:    Dizziness and giddiness    Parkinson's disease ([facility] [address] )  -     Carbidopa-Levodopa ER (RYTARY) 61  MG CPCR; 1 po 9 times a day    Tremor  -     Carbidopa-Levodopa ER (RYTARY) 61  MG CPCR; 1 po 9 times a day    History of CVA (cerebrovascular accident)    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         Subjective:    Diane Zambrano is an 19-year-old patient who 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  He is currently utilizing Aggrenox twice a day with no side effects  He does have some ongoing short-term memory loss as well as Parkinson's disease     At his last visit with Dr Cedrick Martin he was noting some increased freezing and falling  His Rytary was changed to 2 at 8, 1 at 10 am , 1  at noon , 1 at 2 pm , 1 at 4pm ,  2 at 6pm , and 1 at 8 pm  Starting amantadine was also discussed  amantadine was not started due to potential side effects  For dementia, he remained on aricept  He continued on Zyprexa 20 mg daily for mild halluncinations  Prior lower doses have proven to be less effective  Comtan and dopamine agonists both caused worse hallucinations in the past   Sinemet was tried in the past and was ineffective  He recently presented for evaluation for hospital bed  He had difficulty positioning in bed difficulty getting now in a bed and extreme deconditioning  He was continuing to have falls at home worse at night and was often found in the middle of the night on the floor  The hospital bed was approved and recently received it  The rails to have to be adjusted for his height of 5 feet  He was also noted to have abdominal pain at the last visit  He refused to be evlauted in the hospital and she felt that this may have been due to not eating  This resolved  Today he also complains of lightheadedness  This occurred about a 1 hour after the right     His blood pressure initially was 90/60 but repeat blood pressures were 130/70 without any orthostatic hypotension  He did complained of dizziness but specifics were difficult to obtain  He recently finished OT and PT at home  He is more alert            F/u in a few months                                                                                         The following portions of the patient's history were reviewed and updated as appropriate: He  has a past medical history of Dementia; Diabetes mellitus, type 2 ([facility] [address] ); Hypertension; Iron deficiency; Parkinson's disease ([facility] [address] ); Pneumonia; Stroke [facility]); and Vitamin D deficiency  He  has a past surgical history that includes Hallux valgus correction    His family history includes Depression in his family; Diabetes in his family; Heart attack in his family; Hypertension in his family; Pancreatic cancer in his family  He  reports that he has never smoked  He has never used smokeless tobacco  He reports that he does not drink alcohol or use drugs    Current Outpatient Prescriptions   Medication Sig Dispense Refill    amLODIPine (NORVASC) 10 mg tablet Take 1 tablet (10 mg total) by mouth daily 30 tablet 3    aspirin-dipyridamole (AGGRENOX)  mg per 12 hr capsule Take 1 capsule by mouth 2 (two) times a day      Carbidopa-Levodopa ER (RYTARY) 61  MG CPCR 1 po 9 times a day 270 capsule 5    Cholecalciferol (VITAMIN D) 2000 units tablet Take 1 tablet (2,000 Units total) by mouth daily 90 tablet 1    citalopram (CeleXA) 20 mg tablet Take 1 tablet (20 mg total) by mouth daily 30 tablet 5    donepezil (ARICEPT) 5 mg tablet TAKE 1 TABLET BY MOUTH EVERY DAY 30 tablet 5    FREESTYLE LITE test strip TEST TWICE DAILY 100 each 5    Incontinence Supply Disposable (DEPEND UNDERWEAR SM/MED) MISC by Does not apply route 4 (four) times a day 120 each 11    Incontinence Supply Disposable (UNDERPADS) MISC by Does not apply route once a week Please dispense reusable/washable pads 4 each 11    insulin glargine (LANTUS) 100 units/mL subcutaneous injection Inject 20 Units under the skin daily at bedtime 600 Units 0    lisinopril (ZESTRIL) 20 mg tablet Take 1 tablet (20 mg total) by mouth daily (Patient taking differently: Take 10 mg by mouth daily  ) 90 tablet 1    Nutritional Supplements (GLUCERNA ADVANCE SHAKE PO) Take by mouth      OLANZapine (ZyPREXA) 20 MG tablet Take 1 tablet (20 mg total) by mouth daily at bedtime 30 tablet 5    omeprazole (PriLOSEC) 20 mg delayed release capsule Take 20 mg by mouth daily      amantadine (SYMMETREL) 100 mg capsule Take 1 capsule (100 mg total) by mouth 2 (two) times a day 60 capsule 5    Insulin Pen Needle (NOVOFINE PLUS) 32G X 4 MM MISC by Does not apply route daily Use 1 daily 100 each 3    loratadine (CLARITIN) 10 mg tablet Take 10 mg by mouth daily      ondansetron (ZOFRAN) 4 mg tablet Take 1 tablet by mouth every 12 (twelve) hours as needed for nausea or vomiting 30 tablet 1    tamsulosin (FLOMAX) 0 4 mg Take 1 capsule (0 4 mg total) by mouth daily with dinner 30 capsule 0     No current facility-administered medications for this visit  He is allergic to mirapex [pramipexole]            Objective:    Blood pressure 90/60, pulse 70, resp  rate 12, height 5' (1 524 m), weight 50 3 kg (111 lb)  Blood pressure 130/70 sitting  On right and 126/60 on right with standing    Physical Exam   Constitutional: He appears well-developed  HENT:   Head: Normocephalic  Eyes: EOM are normal  Pupils are equal, round, and reactive to light  Neck: Normal range of motion  Cardiovascular: Normal rate  Neurological: He has normal strength  Neurological Exam    Mental Status  The patient is alert  His language is fluent with no aphasia  He has normal attention span and concentration  He has a normal fund of knowledge  Indian speaking     Cranial Nerves    CN II: The patient's visual acuity and visual fields are normal   CN III, IV, VI: The patient's pupils are equally round and reactive to light and ocular movements are normal   CN V: The patient has normal facial sensation  CN VII:  The patient has symmetric facial movement  CN VIII:  The patient's hearing is impaired  CN IX, X: The patient has symmetric palate movement and normal gag reflex  CN XI: The patient's shoulder shrug strength is normal   CN XII: The patient's tongue is midline without atrophy or fasciculations  ceremun bilaterally , right facial weakness , symmetric with smile     Motor   He has normal movement  His strength is 5/5 throughout all four extremities    No tremors, no cogwheeling, seen at 2pm , Rytary at noon     Sensory  The patient's sensation is to light touch and temperature  Reflexes  He has glabellar tap and palmomental release signs present  reflexs 1 , 1 in le      Gait and Coordination   He has a wide stance  He has normal right finger to nose and normal left finger to nose coordination  Stooped posture , needs two to stand          ROS:    Review of Systems   Constitutional: Positive for fatigue  Negative for appetite change and fever  HENT: Positive for tinnitus  Negative for hearing loss, trouble swallowing and voice change  Eyes: Positive for itching  Negative for photophobia and pain  Respiratory: Negative  Negative for shortness of breath  Cardiovascular: Negative  Negative for palpitations  Gastrointestinal: Positive for diarrhea  Negative for nausea and vomiting  Endocrine: Negative  Negative for cold intolerance and heat intolerance  Genitourinary: Negative  Negative for dysuria, frequency and urgency  Musculoskeletal: Negative  Negative for myalgias and neck pain  Skin: Positive for rash  Neurological: Positive for dizziness  Negative for tremors, seizures, syncope, facial asymmetry, speech difficulty, weakness, light-headedness, numbness and headaches  Hematological: Negative  Does not bruise/bleed easily  Psychiatric/Behavioral: Positive for confusion  Negative for hallucinations and sleep disturbance

## 2018-07-20 NOTE — ASSESSMENT & PLAN NOTE
Her multifactorial dizziness this may be more severe one within 1 hour of utilizing rytary    Exam failed to reveal any evidence of orthostatic hypotension  I examined his ears and there was excessive amount of cerumen in there  This could also lead to dizziness and vertiginous type symptoms    Carolina Mahoney  will attempt to address these issues

## 2018-07-20 NOTE — PATIENT INSTRUCTIONS
amatadine 100mg 1/2 tablet in morign for 1 week then 1/ 2 twice   Before 5 pm , can not give afer 5pm     Before rytary     Can wait 30 mins after     If side effects stop amantadine

## 2018-07-20 NOTE — ASSESSMENT & PLAN NOTE
He is on Rytary 61 25 2, 2 2 3 at night     No  halluncinations       Rytary to  continue 2 at 8, 1 10 am , 1  at noon , 1 at2 pm , 1 at 4 ,  2 at 6pm , 1 8 pm      Less freeezing       For PD add amatadine 100mg 1/2 tablet in morign for 1 week then 1/ 2 twice  A day    Before 5 pm , can not give afer 5pm  Due to side effects of insomnia           Can wait 30 mins after amantadine for rytary

## 2018-07-20 NOTE — ASSESSMENT & PLAN NOTE
On Aricept 5 mg a day  We did discuss the use of higher doses  but I would like to hold off  due to the potential of nightmares  If  he is able to tolerate the amantadine then we can attempt a higher dose of Aricept to 10 mg  He is still able to recognize his family,  Friends,  and [place]

## 2018-07-31 ENCOUNTER — APPOINTMENT (EMERGENCY)
Dept: RADIOLOGY | Facility: HOSPITAL | Age: 83
DRG: 435 | End: 2018-07-31
Payer: COMMERCIAL

## 2018-07-31 ENCOUNTER — HOSPITAL ENCOUNTER (INPATIENT)
Facility: HOSPITAL | Age: 83
LOS: 6 days | Discharge: HOSPICE | DRG: 435 | End: 2018-08-06
Attending: EMERGENCY MEDICINE | Admitting: INTERNAL MEDICINE
Payer: COMMERCIAL

## 2018-07-31 ENCOUNTER — APPOINTMENT (EMERGENCY)
Dept: CT IMAGING | Facility: HOSPITAL | Age: 83
DRG: 435 | End: 2018-07-31
Payer: COMMERCIAL

## 2018-07-31 DIAGNOSIS — R53.1 GENERALIZED WEAKNESS: ICD-10-CM

## 2018-07-31 DIAGNOSIS — Z79.4 TYPE 2 DIABETES MELLITUS WITHOUT COMPLICATION, WITH LONG-TERM CURRENT USE OF INSULIN (HCC): ICD-10-CM

## 2018-07-31 DIAGNOSIS — E11.9 TYPE 2 DIABETES MELLITUS WITHOUT COMPLICATION, WITH LONG-TERM CURRENT USE OF INSULIN (HCC): ICD-10-CM

## 2018-07-31 DIAGNOSIS — R35.1 BENIGN PROSTATIC HYPERPLASIA WITH NOCTURIA: ICD-10-CM

## 2018-07-31 DIAGNOSIS — C25.9 PANCREATIC CANCER (HCC): Primary | ICD-10-CM

## 2018-07-31 DIAGNOSIS — T68.XXXA HYPOTHERMIA, INITIAL ENCOUNTER: ICD-10-CM

## 2018-07-31 DIAGNOSIS — N40.1 BENIGN PROSTATIC HYPERPLASIA WITH NOCTURIA: ICD-10-CM

## 2018-07-31 DIAGNOSIS — S31.000A SACRAL WOUND: ICD-10-CM

## 2018-07-31 DIAGNOSIS — K64.1 GRADE II HEMORRHOIDS: ICD-10-CM

## 2018-07-31 LAB
ALBUMIN SERPL BCP-MCNC: 2.5 G/DL (ref 3.5–5)
ALP SERPL-CCNC: 342 U/L (ref 46–116)
ALT SERPL W P-5'-P-CCNC: 7 U/L (ref 12–78)
ANION GAP SERPL CALCULATED.3IONS-SCNC: 9 MMOL/L (ref 4–13)
APTT PPP: 37 SECONDS (ref 24–36)
AST SERPL W P-5'-P-CCNC: 48 U/L (ref 5–45)
BACTERIA UR QL AUTO: ABNORMAL /HPF
BASOPHILS # BLD AUTO: 0.02 THOUSANDS/ΜL (ref 0–0.1)
BASOPHILS NFR BLD AUTO: 0 % (ref 0–1)
BILIRUB SERPL-MCNC: 0.86 MG/DL (ref 0.2–1)
BILIRUB UR QL STRIP: ABNORMAL
BUN SERPL-MCNC: 19 MG/DL (ref 5–25)
CALCIUM SERPL-MCNC: 8.2 MG/DL (ref 8.3–10.1)
CHLORIDE SERPL-SCNC: 106 MMOL/L (ref 100–108)
CK SERPL-CCNC: 83 U/L (ref 39–308)
CLARITY UR: CLEAR
CO2 SERPL-SCNC: 26 MMOL/L (ref 21–32)
COLOR UR: YELLOW
CREAT SERPL-MCNC: 0.87 MG/DL (ref 0.6–1.3)
EOSINOPHIL # BLD AUTO: 0.05 THOUSAND/ΜL (ref 0–0.61)
EOSINOPHIL NFR BLD AUTO: 1 % (ref 0–6)
ERYTHROCYTE [DISTWIDTH] IN BLOOD BY AUTOMATED COUNT: 14.2 % (ref 11.6–15.1)
GFR SERPL CREATININE-BSD FRML MDRD: 77 ML/MIN/1.73SQ M
GLUCOSE SERPL-MCNC: 172 MG/DL (ref 65–140)
GLUCOSE SERPL-MCNC: 197 MG/DL (ref 65–140)
GLUCOSE UR STRIP-MCNC: NEGATIVE MG/DL
HCT VFR BLD AUTO: 30.1 % (ref 36.5–49.3)
HGB BLD-MCNC: 9.2 G/DL (ref 12–17)
HGB UR QL STRIP.AUTO: NEGATIVE
HYALINE CASTS #/AREA URNS LPF: ABNORMAL /LPF
INR PPP: 1.63 (ref 0.86–1.17)
KETONES UR STRIP-MCNC: ABNORMAL MG/DL
LACTATE SERPL-SCNC: 1.9 MMOL/L (ref 0.5–2)
LEUKOCYTE ESTERASE UR QL STRIP: NEGATIVE
LIPASE SERPL-CCNC: 248 U/L (ref 73–393)
LYMPHOCYTES # BLD AUTO: 1.15 THOUSANDS/ΜL (ref 0.6–4.47)
LYMPHOCYTES NFR BLD AUTO: 13 % (ref 14–44)
MCH RBC QN AUTO: 25.7 PG (ref 26.8–34.3)
MCHC RBC AUTO-ENTMCNC: 30.6 G/DL (ref 31.4–37.4)
MCV RBC AUTO: 84 FL (ref 82–98)
MONOCYTES # BLD AUTO: 0.67 THOUSAND/ΜL (ref 0.17–1.22)
MONOCYTES NFR BLD AUTO: 8 % (ref 4–12)
NEUTROPHILS # BLD AUTO: 7.06 THOUSANDS/ΜL (ref 1.85–7.62)
NEUTS SEG NFR BLD AUTO: 79 % (ref 43–75)
NITRITE UR QL STRIP: NEGATIVE
NON-SQ EPI CELLS URNS QL MICRO: ABNORMAL /HPF
NRBC BLD AUTO-RTO: 0 /100 WBCS
PH UR STRIP.AUTO: 6 [PH] (ref 4.5–8)
PLATELET # BLD AUTO: 171 THOUSANDS/UL (ref 149–390)
PMV BLD AUTO: 12.7 FL (ref 8.9–12.7)
POTASSIUM SERPL-SCNC: 4.2 MMOL/L (ref 3.5–5.3)
PROT SERPL-MCNC: 6.8 G/DL (ref 6.4–8.2)
PROT UR STRIP-MCNC: ABNORMAL MG/DL
PROTHROMBIN TIME: 19.4 SECONDS (ref 11.8–14.2)
RBC # BLD AUTO: 3.58 MILLION/UL (ref 3.88–5.62)
RBC #/AREA URNS AUTO: ABNORMAL /HPF
SODIUM SERPL-SCNC: 141 MMOL/L (ref 136–145)
SP GR UR STRIP.AUTO: 1.02 (ref 1–1.03)
TROPONIN I SERPL-MCNC: <0.02 NG/ML
UROBILINOGEN UR QL STRIP.AUTO: 1 E.U./DL
WBC # BLD AUTO: 8.95 THOUSAND/UL (ref 4.31–10.16)
WBC #/AREA URNS AUTO: ABNORMAL /HPF

## 2018-07-31 PROCEDURE — 82550 ASSAY OF CK (CPK): CPT | Performed by: EMERGENCY MEDICINE

## 2018-07-31 PROCEDURE — 99223 1ST HOSP IP/OBS HIGH 75: CPT | Performed by: PHYSICIAN ASSISTANT

## 2018-07-31 PROCEDURE — 82948 REAGENT STRIP/BLOOD GLUCOSE: CPT

## 2018-07-31 PROCEDURE — 96360 HYDRATION IV INFUSION INIT: CPT

## 2018-07-31 PROCEDURE — 72125 CT NECK SPINE W/O DYE: CPT

## 2018-07-31 PROCEDURE — 99285 EMERGENCY DEPT VISIT HI MDM: CPT

## 2018-07-31 PROCEDURE — 85610 PROTHROMBIN TIME: CPT | Performed by: EMERGENCY MEDICINE

## 2018-07-31 PROCEDURE — 87040 BLOOD CULTURE FOR BACTERIA: CPT | Performed by: EMERGENCY MEDICINE

## 2018-07-31 PROCEDURE — 84484 ASSAY OF TROPONIN QUANT: CPT | Performed by: EMERGENCY MEDICINE

## 2018-07-31 PROCEDURE — 93005 ELECTROCARDIOGRAM TRACING: CPT

## 2018-07-31 PROCEDURE — 71046 X-RAY EXAM CHEST 2 VIEWS: CPT

## 2018-07-31 PROCEDURE — 83690 ASSAY OF LIPASE: CPT | Performed by: EMERGENCY MEDICINE

## 2018-07-31 PROCEDURE — 85730 THROMBOPLASTIN TIME PARTIAL: CPT | Performed by: EMERGENCY MEDICINE

## 2018-07-31 PROCEDURE — 80053 COMPREHEN METABOLIC PANEL: CPT | Performed by: EMERGENCY MEDICINE

## 2018-07-31 PROCEDURE — 36415 COLL VENOUS BLD VENIPUNCTURE: CPT | Performed by: EMERGENCY MEDICINE

## 2018-07-31 PROCEDURE — 70450 CT HEAD/BRAIN W/O DYE: CPT

## 2018-07-31 PROCEDURE — 85025 COMPLETE CBC W/AUTO DIFF WBC: CPT | Performed by: EMERGENCY MEDICINE

## 2018-07-31 PROCEDURE — 74177 CT ABD & PELVIS W/CONTRAST: CPT

## 2018-07-31 PROCEDURE — 96361 HYDRATE IV INFUSION ADD-ON: CPT

## 2018-07-31 PROCEDURE — 83605 ASSAY OF LACTIC ACID: CPT | Performed by: EMERGENCY MEDICINE

## 2018-07-31 PROCEDURE — 81001 URINALYSIS AUTO W/SCOPE: CPT

## 2018-07-31 RX ORDER — OLANZAPINE 10 MG/1
20 TABLET ORAL
Status: DISCONTINUED | OUTPATIENT
Start: 2018-07-31 | End: 2018-08-06 | Stop reason: HOSPADM

## 2018-07-31 RX ORDER — LISINOPRIL 5 MG/1
10 TABLET ORAL DAILY
Status: DISCONTINUED | OUTPATIENT
Start: 2018-08-01 | End: 2018-08-06 | Stop reason: HOSPADM

## 2018-07-31 RX ORDER — AMANTADINE HYDROCHLORIDE 100 MG/1
100 CAPSULE, GELATIN COATED ORAL DAILY
Status: DISCONTINUED | OUTPATIENT
Start: 2018-08-01 | End: 2018-08-01 | Stop reason: CLARIF

## 2018-07-31 RX ORDER — ONDANSETRON 2 MG/ML
4 INJECTION INTRAMUSCULAR; INTRAVENOUS EVERY 6 HOURS PRN
Status: DISCONTINUED | OUTPATIENT
Start: 2018-07-31 | End: 2018-08-06 | Stop reason: HOSPADM

## 2018-07-31 RX ORDER — MORPHINE SULFATE 2 MG/ML
2 INJECTION, SOLUTION INTRAMUSCULAR; INTRAVENOUS EVERY 4 HOURS PRN
Status: DISCONTINUED | OUTPATIENT
Start: 2018-07-31 | End: 2018-08-03

## 2018-07-31 RX ORDER — CARBIDOPA/LEVODOPA 25MG-250MG
1 TABLET ORAL ONCE
Status: COMPLETED | OUTPATIENT
Start: 2018-07-31 | End: 2018-08-01

## 2018-07-31 RX ORDER — AMLODIPINE BESYLATE 10 MG/1
10 TABLET ORAL DAILY
Status: DISCONTINUED | OUTPATIENT
Start: 2018-08-01 | End: 2018-07-31

## 2018-07-31 RX ORDER — AMLODIPINE BESYLATE 10 MG/1
10 TABLET ORAL
Status: DISCONTINUED | OUTPATIENT
Start: 2018-07-31 | End: 2018-08-06 | Stop reason: HOSPADM

## 2018-07-31 RX ORDER — PANTOPRAZOLE SODIUM 40 MG/1
40 TABLET, DELAYED RELEASE ORAL
Status: DISCONTINUED | OUTPATIENT
Start: 2018-08-01 | End: 2018-08-06 | Stop reason: HOSPADM

## 2018-07-31 RX ORDER — DONEPEZIL HYDROCHLORIDE 5 MG/1
5 TABLET, FILM COATED ORAL
Status: DISCONTINUED | OUTPATIENT
Start: 2018-07-31 | End: 2018-08-06 | Stop reason: HOSPADM

## 2018-07-31 RX ORDER — ASPIRIN AND DIPYRIDAMOLE 25; 200 MG/1; MG/1
1 CAPSULE, EXTENDED RELEASE ORAL 2 TIMES DAILY
Status: DISCONTINUED | OUTPATIENT
Start: 2018-07-31 | End: 2018-08-06 | Stop reason: HOSPADM

## 2018-07-31 RX ORDER — INSULIN GLARGINE 100 [IU]/ML
10 INJECTION, SOLUTION SUBCUTANEOUS
Status: DISCONTINUED | OUTPATIENT
Start: 2018-07-31 | End: 2018-08-03

## 2018-07-31 RX ORDER — CITALOPRAM 20 MG/1
20 TABLET ORAL DAILY
Status: DISCONTINUED | OUTPATIENT
Start: 2018-08-01 | End: 2018-08-06 | Stop reason: HOSPADM

## 2018-07-31 RX ADMIN — SODIUM CHLORIDE 1000 ML: 0.9 INJECTION, SOLUTION INTRAVENOUS at 18:13

## 2018-07-31 RX ADMIN — ASPIRIN AND EXTENDED-RELEASE DIPYRIDAMOLE 1 CAPSULE: 25; 200 CAPSULE ORAL at 22:37

## 2018-07-31 RX ADMIN — IOHEXOL 80 ML: 350 INJECTION, SOLUTION INTRAVENOUS at 19:05

## 2018-07-31 NOTE — ED ATTENDING ATTESTATION
Olivia Mullen MD, saw and evaluated the patient  I have discussed the patient with the resident/non-physician practitioner and agree with the resident's/non-physician practitioner's findings, Plan of Care, and MDM as documented in the resident's/non-physician practitioner's note, except where noted  All available labs and Radiology studies were reviewed  At this point I agree with the current assessment done in the Emergency Department  I have conducted an independent evaluation of this patient a history and physical is as follows:      80year old male presents for evaluation of worsening generalized weakness and fall  Patient has baseline dementia and is unable provide any meaningful history  Unknown amount of down time  Unable perform review of systems secondary to dementia  On exam no acute distress, HEENT trauma is unremarkable, with palpation of cervical thoracic and lumbar spines, bilateral upper and lower extremity trauma exam is within normal limits, cardiac/lungs/abdomen/pelvis trauma exam is within normal limits-  Medical decision making;-fall and hypothermia-will do cardiac/septic workup, CT head rule out acute CNS pathology, chest x-ray rule out pneumonia/rib fracture/pneumothorax, admit    Critical Care Time  CritCare Time    Procedures

## 2018-07-31 NOTE — ED NOTES
Unable to obtain 2nd set of [facility] x2  Another RN will attempt shortly   Pt to Sherrill Eisenmenger, MAEVE  07/31/18 162 NACHO Justin RN  07/31/18 [id number]

## 2018-07-31 NOTE — ED PROVIDER NOTES
History  Chief Complaint   Patient presents with   Jullie Liming Fall     Patient presents with daughter, reports patient was found on ground today following fall by home health aid  Pt was covered in stool, per daughter  Shares that he's had increased weakness, but denies any new mental status changes or confusion  Hx of parkinsons   Leg Swelling     Reports bilateral lower extremity edema to the point of "not being able to put his shoes on"  80year old male presents for evaluation of generalized weakness and fall that occurred today  Patient's daughter is at bedside and helps provide the history  Patient has a hx of dementia and parkinsons  He currently denies any complaints  Daughter states that patient was found on the ground today by visiting home health aid  Unknown down time  Patient denies head trauma or LOC  Denies chest pain, SOB, abdominal pain, fever, chills, vomiting  Daughter states he complained of abdominal pain intermittently for the last 3 weeks but currently denies it  Prior to Admission Medications   Prescriptions Last Dose Informant Patient Reported? Taking?    Carbidopa-Levodopa ER (RYTARY) 61  MG CPCR   No No   Si po 9 times a day   Cholecalciferol (VITAMIN D) 2000 units tablet   No No   Sig: Take 1 tablet (2,000 Units total) by mouth daily   FREESTYLE LITE test strip   No No   Sig: TEST TWICE DAILY   Incontinence Supply Disposable (DEPEND UNDERWEAR SM/MED) MISC   No No   Sig: by Does not apply route 4 (four) times a day   Incontinence Supply Disposable (UNDERPADS) MISC   No No   Sig: by Does not apply route once a week Please dispense reusable/washable pads   Insulin Pen Needle (NOVOFINE PLUS) 32G X 4 MM MISC   No No   Sig: by Does not apply route daily Use 1 daily   Nutritional Supplements (GLUCERNA ADVANCE SHAKE PO)   Yes No   Sig: Take by mouth   OLANZapine (ZyPREXA) 20 MG tablet   No No   Sig: Take 1 tablet (20 mg total) by mouth daily at bedtime   amLODIPine (NORVASC) 10 mg tablet   No No   Sig: Take 1 tablet (10 mg total) by mouth daily   amantadine (SYMMETREL) 100 mg capsule   No No   Sig: Take 1 capsule (100 mg total) by mouth 2 (two) times a day   aspirin-dipyridamole (AGGRENOX)  mg per 12 hr capsule   Yes No   Sig: Take 1 capsule by mouth 2 (two) times a day   citalopram (CeleXA) 20 mg tablet   No No   Sig: Take 1 tablet (20 mg total) by mouth daily   donepezil (ARICEPT) 5 mg tablet   No No   Sig: TAKE 1 TABLET BY MOUTH EVERY DAY   insulin glargine (LANTUS) 100 units/mL subcutaneous injection   No No   Sig: Inject 20 Units under the skin daily at bedtime   lisinopril (ZESTRIL) 20 mg tablet   No No   Sig: Take 1 tablet (20 mg total) by mouth daily   Patient taking differently: Take 10 mg by mouth daily     loratadine (CLARITIN) 10 mg tablet   Yes No   Sig: Take 10 mg by mouth daily   omeprazole (PriLOSEC) 20 mg delayed release capsule   Yes No   Sig: Take 20 mg by mouth daily   ondansetron (ZOFRAN) 4 mg tablet   No No   Sig: Take 1 tablet by mouth every 12 (twelve) hours as needed for nausea or vomiting   tamsulosin (FLOMAX) 0 4 mg   No No   Sig: Take 1 capsule (0 4 mg total) by mouth daily with dinner      Facility-Administered Medications: None       Past Medical History:   Diagnosis Date    Dementia     Diabetes mellitus, type 2 ([facility] )     last assessed 10/13/14    Hypertension     Iron deficiency     Parkinson's disease ([facility] )     Pneumonia     community aquired /last assessed 1/26/17    Stroke ([facility] )     Vitamin D deficiency        Past Surgical History:   Procedure Laterality Date    HALLUX VALGUS CORRECTION         Family History   Problem Relation Age of Onset    Heart attack Family         acute MI    Pancreatic cancer Family     Depression Family     Diabetes Family     Hypertension Family      I have reviewed and agree with the history as documented      Social History   Substance Use Topics    Smoking status: Never Smoker    Smokeless tobacco: Never Used    Alcohol use No        Review of Systems   Unable to perform ROS: Dementia   All other systems reviewed and are negative  Physical Exam  ED Triage Vitals   Temperature Pulse Respirations Blood Pressure SpO2   07/31/18 1717 07/31/18 1716 07/31/18 1716 07/31/18 1716 07/31/18 1716   (!) 96 9 °F (36 1 °C) (!) 54 18 109/56 99 %      Temp Source Heart Rate Source Patient Position - Orthostatic VS BP Location FiO2 (%)   07/31/18 1717 07/31/18 1716 07/31/18 1716 07/31/18 1716 --   Temporal Monitor Sitting Left arm       Pain Score       07/31/18 1716       5           Orthostatic Vital Signs  Vitals:    07/31/18 2109 07/31/18 2128 07/31/18 2300 07/31/18 2345   BP: (!) 172/77 (!) 177/83 (!) 196/89 (!) 182/78   Pulse: 69 90 66    Patient Position - Orthostatic VS: Lying Lying Lying Lying       Physical Exam   Constitutional: He appears well-developed and well-nourished  No distress  HENT:   Head: Normocephalic and atraumatic  Previously biopsied mole on R eyebrow  Not actively bleeding  Eyes: Conjunctivae and EOM are normal  Pupils are equal, round, and reactive to light  Finger to nose intact  Neck: Normal range of motion  Neck supple  No C spine tenderness   Cardiovascular: Normal rate and regular rhythm  Pulmonary/Chest: Effort normal and breath sounds normal  No respiratory distress  He has no wheezes  He has no rales  Abdominal: Soft  Bowel sounds are normal  There is no tenderness  There is no guarding  Genitourinary:   Genitourinary Comments: Stage 1 sacral decub ulcer  Musculoskeletal: Normal range of motion  He exhibits edema  He exhibits no tenderness  R>L LE ankle edema  No erythema or tenderness  Neurological: He is alert  No cranial nerve deficit or sensory deficit  He exhibits normal muscle tone  Coordination normal    Skin: Skin is warm  He is not diaphoretic  No erythema  Psychiatric: He has a normal mood and affect   His behavior is normal    Nursing note and vitals reviewed        ED Medications  Medications   aspirin-dipyridamole (AGGRENOX)  mg per 12 hr capsule 1 capsule (1 capsule Oral Given 7/31/18 2237)   donepezil (ARICEPT) tablet 5 mg (0 mg Oral Hold 7/31/18 2238)   insulin glargine (LANTUS) subcutaneous injection 10 Units 0 1 mL (10 Units Subcutaneous Not Given 7/31/18 2230)   lisinopril (ZESTRIL) tablet 10 mg (not administered)   OLANZapine (ZyPREXA) tablet 20 mg (0 mg Oral Hold 7/31/18 2238)   pantoprazole (PROTONIX) EC tablet 40 mg (not administered)   amantadine (SYMMETREL) capsule 100 mg (not administered)   citalopram (CeleXA) tablet 20 mg (not administered)   ondansetron (ZOFRAN) injection 4 mg (not administered)   insulin lispro (HumaLOG) 100 units/mL subcutaneous injection 1-5 Units (not administered)   insulin lispro (HumaLOG) 100 units/mL subcutaneous injection 1-5 Units (1 Units Subcutaneous Not Given 7/31/18 2230)   morphine injection 2 mg (not administered)   carbidopa-levodopa (SINEMET)  mg per tablet 1 tablet (0 tablets Oral Hold 7/31/18 2237)   NON FORMULARY ( Oral Hold 7/31/18 2230)   amLODIPine (NORVASC) tablet 10 mg (0 mg Oral Canceled Entry 7/31/18 2249)   sodium chloride 0 9 % bolus 1,000 mL (1,000 mL Intravenous New Bag 7/31/18 1813)   iohexol (OMNIPAQUE) 350 MG/ML injection (SINGLE-DOSE) 80 mL (80 mL Intravenous Given 7/31/18 1905)       Diagnostic Studies  Results Reviewed     Procedure Component Value Units Date/Time    Urine Microscopic [[id number]]  (Abnormal) Collected:  07/31/18 1847    Lab Status:  Final result Specimen:  Urine from Urine, Clean Catch Updated:  07/31/18 1947     RBC, UA None Seen /hpf      WBC, UA 2-4 (A) /hpf      Epithelial Cells Occasional /hpf      Bacteria, UA None Seen /hpf      Hyaline Casts, UA 0-1 (A) /lpf     Lactic Acid x2 [[id number]]  (Normal) Resulted:  07/31/18 1936    Lab Status:  Final result Specimen:  Blood Updated:  07/31/18 1936     LACTIC ACID 1 9 mmol/L     Narrative:         Result may be elevated if tourniquet was used during collection  Result may be elevated if tourniquet was used during collection  Blood culture [[id number]] Collected:  07/31/18 1849    Lab Status: In process Specimen:  Blood from Arm, Left Updated:  07/31/18 1908    POCT urinalysis dipstick [[id number]]  (Abnormal) Resulted:  07/31/18 1844    Lab Status:  Final result Updated:  07/31/18 1844    Troponin I [[id number]]  (Normal) Collected:  07/31/18 1751    Lab Status:  Final result Specimen:  Blood from Arm, Right Updated:  07/31/18 1841     Troponin I <0 02 ng/mL     Comprehensive metabolic panel [[id number]]  (Abnormal) Collected:  07/31/18 1751    Lab Status:  Final result Specimen:  Blood from Arm, Right Updated:  07/31/18 1840     Sodium 141 mmol/L      Potassium 4 2 mmol/L      Chloride 106 mmol/L      CO2 26 mmol/L      Anion Gap 9 mmol/L      BUN 19 mg/dL      Creatinine 0 87 mg/dL      Glucose 197 (H) mg/dL      Calcium 8 2 (L) mg/dL      AST 48 (H) U/L      ALT 7 (L) U/L      Alkaline Phosphatase 342 (H) U/L      Total Protein 6 8 g/dL      Albumin 2 5 (L) g/dL      Total Bilirubin 0 86 mg/dL      eGFR 77 ml/min/1 73sq m     Narrative:         National Kidney Disease Education Program recommendations are as follows:  GFR calculation is accurate only with a steady state creatinine  Chronic Kidney disease less than 60 ml/min/1 73 sq  meters  Kidney failure less than 15 ml/min/1 73 sq  meters      Lipase [[id number]]  (Normal) Collected:  07/31/18 1751    Lab Status:  Final result Specimen:  Blood from Arm, Right Updated:  07/31/18 1840     Lipase 248 u/L     CK (with reflex to MB) [[id number]]  (Normal) Collected:  07/31/18 1751    Lab Status:  Final result Specimen:  Blood from Arm, Right Updated:  07/31/18 1840     Total CK 83 U/L     ED Urine Macroscopic [[id number]]  (Abnormal) Collected:  07/31/18 1847    Lab Status:  Final result Specimen:  Urine Updated:  07/31/18 1840     Color, UA Yellow     Clarity, UA Clear pH, UA 6 0     Leukocytes, UA Negative     Nitrite, UA Negative     Protein,  (2+) (A) mg/dl      Glucose, UA Negative mg/dl      Ketones, UA Trace (A) mg/dl      Urobilinogen, UA 1 0 E U /dl      Bilirubin, UA Interference- unable to analyze (A)     Blood, UA Negative     Specific Gravity, UA 1 025    Narrative:       CLINITEK RESULT    Protime-INR [[id number]]  (Abnormal) Collected:  07/31/18 1751    Lab Status:  Final result Specimen:  Blood from Arm, Right Updated:  07/31/18 1837     Protime 19 4 (H) seconds      INR 1 63 (H)    APTT [[id number]]  (Abnormal) Collected:  07/31/18 1751    Lab Status:  Final result Specimen:  Blood from Arm, Right Updated:  07/31/18 1837     PTT 37 (H) seconds     CBC and differential [[id number]]  (Abnormal) Collected:  07/31/18 1751    Lab Status:  Final result Specimen:  Blood from Arm, Right Updated:  07/31/18 1820     WBC 8 95 Thousand/uL      RBC 3 58 (L) Million/uL      Hemoglobin 9 2 (L) g/dL      Hematocrit 30 1 (L) %      MCV 84 fL      MCH 25 7 (L) pg      MCHC 30 6 (L) g/dL      RDW 14 2 %      MPV 12 7 fL      Platelets 777 Thousands/uL      nRBC 0 /100 WBCs      Neutrophils Relative 79 (H) %      Lymphocytes Relative 13 (L) %      Monocytes Relative 8 %      Eosinophils Relative 1 %      Basophils Relative 0 %      Neutrophils Absolute 7 06 Thousands/µL      Lymphocytes Absolute 1 15 Thousands/µL      Monocytes Absolute 0 67 Thousand/µL      Eosinophils Absolute 0 05 Thousand/µL      Basophils Absolute 0 02 Thousands/µL     Blood culture [[id number]] Collected:  07/31/18 1751    Lab Status: In process Specimen:  Blood from Arm, Right Updated:  07/31/18 1813                 X-ray chest 2 views   Final Result by Buster Garces DO (07/31 2057)      Blunting of the posterior costophrenic angles suggesting trace pleural effusions  Faint right middle lobe opacity suggested, nonspecific              Workstation performed: [id number]         CT abdomen pelvis with contrast   Final Result by Luba Haines MD (1933)      3 3 cm soft tissue mass at the mid pancreatic body which appears to encase the adjacent celiac and superior mesenteric arteries  There is atrophy and marked ductal dilatation of the distal pancreas  The findings are highly suspicious for malignancy  An enhanced MRI abdomen/MRCP is recommended for further evaluation  Heterogeneous enhancement of the liver with multiple ill-defined lesions measuring up to 3 9 cm suspicious for metastatic disease  This can be further evaluated at the time of the MRI abdomen  Small to moderate amount of intra-abdominal and pelvic ascites  No free intraperitoneal air  Scattered colonic diverticulosis with no inflammatory changes present to suggest acute diverticulitis  Scattered subcentimeter nodules noted at both lung bases suspicious for metastatic disease  A few peripheral ground glass opacities are also noted at the right lung base suspicious for an infectious or inflammatory process  Trace bilateral pleural    effusions  A CT scan of the chest is recommended for further evaluation  I personally discussed this study with Ronal Schmidt on 7/31/2018 at 7:31 PM                 Workstation performed: [id number]         CT spine cervical without contrast   Final Result by Vishal Flynn DO (07/31 1926)      No cervical spine fracture or traumatic malalignment  Moderately severe multilevel cervical spondylosis  Workstation performed: [id number]         CT head without contrast   Final Result by Vishal Flynn DO (07/31 1919)      No acute intracranial abnormality  Workstation performed: [id number]               Procedures  Procedures      Phone Consults  ED Phone Contact    ED Course  ED Course as of Jul 31 2351   Tue Jul 31, 2018   1949 Discussion with daughter regarding pancreatic mass highly suspicious for cancer   She declined when I offered to use a  phone to tell patient and stated she wanted to tell him herself  MDM  Number of Diagnoses or Management Options  Diagnosis management comments: 80year old male presenting with generalized weakness and fall today  Rectal temp 94 6  Will obtain sepsis work up including lactic, blood cx, labs, EKG, CTH, A/P, CXR  CritCare Time    Disposition  Final diagnoses:   Pancreatic cancer ([facility] )   Hypothermia, initial encounter   Generalized weakness     Time reflects when diagnosis was documented in both MDM as applicable and the Disposition within this note     Time User Action Codes Description Comment    7/31/2018  7:48 PM Bradley Barth Add [C25 9] Pancreatic cancer ([facility] )     7/31/2018  7:48 PM Reed, [address]  XXXA] Hypothermia, initial encounter     7/31/2018  7:48 PM Bradley Barth Add [R53 1] Generalized weakness     7/31/2018  9:24 PM Janifer Felty, Margretta Castillo A Modify [C25 9] Pancreatic cancer [facility])       ED Disposition     ED Disposition Condition Comment    Admit  Case was discussed with JORI and the patient's admission status was agreed to be Admission Status: inpatient status to the service of Dr Luis Carlos Flores           Follow-up Information    None         Current Discharge Medication List      CONTINUE these medications which have NOT CHANGED    Details   amantadine (SYMMETREL) 100 mg capsule Take 1 capsule (100 mg total) by mouth 2 (two) times a day  Qty: 60 capsule, Refills: 5    Associated Diagnoses: Parkinson's disease (HCC)      amLODIPine (NORVASC) 10 mg tablet Take 1 tablet (10 mg total) by mouth daily  Qty: 30 tablet, Refills: 3    Associated Diagnoses: Benign essential hypertension      aspirin-dipyridamole (AGGRENOX)  mg per 12 hr capsule Take 1 capsule by mouth 2 (two) times a day      Carbidopa-Levodopa ER (RYTARY) 61  MG CPCR 1 po 9 times a day  Qty: 270 capsule, Refills: 5    Comments: Please given brand name rytary  Associated Diagnoses: Parkinson's disease ([facility] ); Tremor      Cholecalciferol (VITAMIN D) 2000 units tablet Take 1 tablet (2,000 Units total) by mouth daily  Qty: 90 tablet, Refills: 1    Associated Diagnoses: Type 2 diabetes mellitus without complication, with long-term current use of insulin ([facility])      citalopram (CeleXA) 20 mg tablet Take 1 tablet (20 mg total) by mouth daily  Qty: 30 tablet, Refills: 5    Associated Diagnoses: Dementia due to Parkinson's disease without behavioral disturbance ([facility])      donepezil (ARICEPT) 5 mg tablet TAKE 1 TABLET BY MOUTH EVERY DAY  Qty: 30 tablet, Refills: 5    Associated Diagnoses: Dementia due to Parkinson's disease without behavioral disturbance ([facility])      FREESTYLE LITE test strip TEST TWICE DAILY  Qty: 100 each, Refills: 5    Associated Diagnoses: Type 2 diabetes mellitus without complication, with long-term current use of insulin ([facility])      !! Incontinence Supply Disposable (DEPEND UNDERWEAR SM/MED) MISC by Does not apply route 4 (four) times a day  Qty: 120 each, Refills: 11    Associated Diagnoses: Urgency of urination      !!  Incontinence Supply Disposable (UNDERPADS) MISC by Does not apply route once a week Please dispense reusable/washable pads  Qty: 4 each, Refills: 11    Associated Diagnoses: Urgency of urination      insulin glargine (LANTUS) 100 units/mL subcutaneous injection Inject 20 Units under the skin daily at bedtime  Qty: 600 Units, Refills: 0    Associated Diagnoses: Type 2 diabetes mellitus without complication, with long-term current use of insulin ([facility])      Insulin Pen Needle (NOVOFINE PLUS) 32G X 4 MM MISC by Does not apply route daily Use 1 daily  Qty: 100 each, Refills: 3    Associated Diagnoses: Uncontrolled diabetes mellitus type 2 without complications, unspecified long term insulin use status      lisinopril (ZESTRIL) 20 mg tablet Take 1 tablet (20 mg total) by mouth daily  Qty: 90 tablet, Refills: 1    Associated Diagnoses: Type 2 diabetes mellitus without complication, with long-term current use of insulin ([address] ); Essential hypertension      loratadine (CLARITIN) 10 mg tablet Take 10 mg by mouth daily      Nutritional Supplements (GLUCERNA ADVANCE SHAKE PO) Take by mouth      OLANZapine (ZyPREXA) 20 MG tablet Take 1 tablet (20 mg total) by mouth daily at bedtime  Qty: 30 tablet, Refills: 5    Associated Diagnoses: Dementia due to Parkinson's disease without behavioral disturbance (HCC)      omeprazole (PriLOSEC) 20 mg delayed release capsule Take 20 mg by mouth daily      ondansetron (ZOFRAN) 4 mg tablet Take 1 tablet by mouth every 12 (twelve) hours as needed for nausea or vomiting  Qty: 30 tablet, Refills: 1      tamsulosin (FLOMAX) 0 4 mg Take 1 capsule (0 4 mg total) by mouth daily with dinner  Qty: 30 capsule, Refills: 0    Associated Diagnoses: Benign prostatic hyperplasia with nocturia       !! - Potential duplicate medications found  Please discuss with provider  No discharge procedures on file  ED Provider  Attending physically available and evaluated Alberta Primrose I managed the patient along with the ED Attending      Electronically Signed by         Ange Salguero DO  07/31/18 1168

## 2018-08-01 ENCOUNTER — TELEPHONE (OUTPATIENT)
Dept: NEUROLOGY | Facility: CLINIC | Age: 83
End: 2018-08-01

## 2018-08-01 LAB
ALBUMIN SERPL BCP-MCNC: 2.4 G/DL (ref 3.5–5)
ALP SERPL-CCNC: 341 U/L (ref 46–116)
ALT SERPL W P-5'-P-CCNC: 9 U/L (ref 12–78)
ANION GAP SERPL CALCULATED.3IONS-SCNC: 9 MMOL/L (ref 4–13)
AST SERPL W P-5'-P-CCNC: 32 U/L (ref 5–45)
ATRIAL RATE: 55 BPM
BILIRUB SERPL-MCNC: 0.95 MG/DL (ref 0.2–1)
BUN SERPL-MCNC: 18 MG/DL (ref 5–25)
CALCIUM SERPL-MCNC: 8.2 MG/DL (ref 8.3–10.1)
CHLORIDE SERPL-SCNC: 107 MMOL/L (ref 100–108)
CO2 SERPL-SCNC: 25 MMOL/L (ref 21–32)
CREAT SERPL-MCNC: 0.81 MG/DL (ref 0.6–1.3)
ERYTHROCYTE [DISTWIDTH] IN BLOOD BY AUTOMATED COUNT: 14.2 % (ref 11.6–15.1)
EST. AVERAGE GLUCOSE BLD GHB EST-MCNC: 146 MG/DL
GFR SERPL CREATININE-BSD FRML MDRD: 79 ML/MIN/1.73SQ M
GLUCOSE SERPL-MCNC: 134 MG/DL (ref 65–140)
GLUCOSE SERPL-MCNC: 164 MG/DL (ref 65–140)
GLUCOSE SERPL-MCNC: 180 MG/DL (ref 65–140)
GLUCOSE SERPL-MCNC: 220 MG/DL (ref 65–140)
GLUCOSE SERPL-MCNC: 241 MG/DL (ref 65–140)
HBA1C MFR BLD: 6.7 % (ref 4.2–6.3)
HCT VFR BLD AUTO: 30.3 % (ref 36.5–49.3)
HGB BLD-MCNC: 9.3 G/DL (ref 12–17)
MAGNESIUM SERPL-MCNC: 1.8 MG/DL (ref 1.6–2.6)
MCH RBC QN AUTO: 25.7 PG (ref 26.8–34.3)
MCHC RBC AUTO-ENTMCNC: 30.7 G/DL (ref 31.4–37.4)
MCV RBC AUTO: 84 FL (ref 82–98)
P AXIS: 74 DEGREES
PLATELET # BLD AUTO: 187 THOUSANDS/UL (ref 149–390)
PMV BLD AUTO: 12.4 FL (ref 8.9–12.7)
POTASSIUM SERPL-SCNC: 3.2 MMOL/L (ref 3.5–5.3)
PR INTERVAL: 142 MS
PROT SERPL-MCNC: 6.4 G/DL (ref 6.4–8.2)
QRS AXIS: -3 DEGREES
QRSD INTERVAL: 142 MS
QT INTERVAL: 464 MS
QTC INTERVAL: 443 MS
RBC # BLD AUTO: 3.62 MILLION/UL (ref 3.88–5.62)
SODIUM SERPL-SCNC: 141 MMOL/L (ref 136–145)
T WAVE AXIS: 4 DEGREES
VENTRICULAR RATE: 55 BPM
WBC # BLD AUTO: 10.57 THOUSAND/UL (ref 4.31–10.16)

## 2018-08-01 PROCEDURE — 99222 1ST HOSP IP/OBS MODERATE 55: CPT | Performed by: INTERNAL MEDICINE

## 2018-08-01 PROCEDURE — 83735 ASSAY OF MAGNESIUM: CPT | Performed by: PHYSICIAN ASSISTANT

## 2018-08-01 PROCEDURE — G8987 SELF CARE CURRENT STATUS: HCPCS

## 2018-08-01 PROCEDURE — 97167 OT EVAL HIGH COMPLEX 60 MIN: CPT

## 2018-08-01 PROCEDURE — 82948 REAGENT STRIP/BLOOD GLUCOSE: CPT

## 2018-08-01 PROCEDURE — G8978 MOBILITY CURRENT STATUS: HCPCS

## 2018-08-01 PROCEDURE — 85027 COMPLETE CBC AUTOMATED: CPT | Performed by: PHYSICIAN ASSISTANT

## 2018-08-01 PROCEDURE — 80053 COMPREHEN METABOLIC PANEL: CPT | Performed by: PHYSICIAN ASSISTANT

## 2018-08-01 PROCEDURE — 83036 HEMOGLOBIN GLYCOSYLATED A1C: CPT | Performed by: PHYSICIAN ASSISTANT

## 2018-08-01 PROCEDURE — G8979 MOBILITY GOAL STATUS: HCPCS

## 2018-08-01 PROCEDURE — G8988 SELF CARE GOAL STATUS: HCPCS

## 2018-08-01 PROCEDURE — 99232 SBSQ HOSP IP/OBS MODERATE 35: CPT | Performed by: INTERNAL MEDICINE

## 2018-08-01 PROCEDURE — 93010 ELECTROCARDIOGRAM REPORT: CPT | Performed by: INTERNAL MEDICINE

## 2018-08-01 PROCEDURE — 97163 PT EVAL HIGH COMPLEX 45 MIN: CPT

## 2018-08-01 RX ORDER — HYDRALAZINE HYDROCHLORIDE 20 MG/ML
5 INJECTION INTRAMUSCULAR; INTRAVENOUS EVERY 6 HOURS PRN
Status: DISCONTINUED | OUTPATIENT
Start: 2018-08-01 | End: 2018-08-06 | Stop reason: HOSPADM

## 2018-08-01 RX ORDER — AMANTADINE HYDROCHLORIDE 100 MG/1
100 TABLET ORAL DAILY
Status: DISCONTINUED | OUTPATIENT
Start: 2018-08-01 | End: 2018-08-01

## 2018-08-01 RX ORDER — AMANTADINE HYDROCHLORIDE 100 MG/1
50 TABLET ORAL DAILY
Status: DISCONTINUED | OUTPATIENT
Start: 2018-08-02 | End: 2018-08-01 | Stop reason: CLARIF

## 2018-08-01 RX ORDER — POTASSIUM CHLORIDE 20 MEQ/1
40 TABLET, EXTENDED RELEASE ORAL ONCE
Status: COMPLETED | OUTPATIENT
Start: 2018-08-01 | End: 2018-08-01

## 2018-08-01 RX ORDER — AMANTADINE HYDROCHLORIDE 100 MG/1
50 TABLET ORAL DAILY
Status: DISCONTINUED | OUTPATIENT
Start: 2018-08-02 | End: 2018-08-06 | Stop reason: HOSPADM

## 2018-08-01 RX ORDER — AMANTADINE HYDROCHLORIDE 100 MG/1
50 TABLET ORAL DAILY
Status: DISCONTINUED | OUTPATIENT
Start: 2018-08-02 | End: 2018-08-01 | Stop reason: SDUPTHER

## 2018-08-01 RX ADMIN — DONEPEZIL HYDROCHLORIDE 5 MG: 5 TABLET, FILM COATED ORAL at 00:15

## 2018-08-01 RX ADMIN — CITALOPRAM HYDROBROMIDE 20 MG: 20 TABLET ORAL at 10:10

## 2018-08-01 RX ADMIN — OLANZAPINE 20 MG: 10 TABLET, FILM COATED ORAL at 21:49

## 2018-08-01 RX ADMIN — AMLODIPINE BESYLATE 10 MG: 10 TABLET ORAL at 00:15

## 2018-08-01 RX ADMIN — INSULIN GLARGINE 5 UNITS: 100 INJECTION, SOLUTION SUBCUTANEOUS at 22:45

## 2018-08-01 RX ADMIN — MORPHINE SULFATE 2 MG: 2 INJECTION, SOLUTION INTRAMUSCULAR; INTRAVENOUS at 04:46

## 2018-08-01 RX ADMIN — HYDRALAZINE HYDROCHLORIDE 5 MG: 20 INJECTION INTRAMUSCULAR; INTRAVENOUS at 02:07

## 2018-08-01 RX ADMIN — CARBIDOPA AND LEVODOPA 1 TABLET: 25; 250 TABLET ORAL at 00:15

## 2018-08-01 RX ADMIN — ASPIRIN AND EXTENDED-RELEASE DIPYRIDAMOLE 1 CAPSULE: 25; 200 CAPSULE ORAL at 18:06

## 2018-08-01 RX ADMIN — OLANZAPINE 20 MG: 10 TABLET, FILM COATED ORAL at 00:15

## 2018-08-01 RX ADMIN — POTASSIUM CHLORIDE 40 MEQ: 1500 TABLET, EXTENDED RELEASE ORAL at 10:13

## 2018-08-01 RX ADMIN — AMANTADINE HYDROCHLORIDE 100 MG: 100 CAPSULE ORAL at 10:10

## 2018-08-01 RX ADMIN — AMANTADINE HYDROCHLORIDE 50 MG: 100 TABLET ORAL at 10:30

## 2018-08-01 RX ADMIN — DONEPEZIL HYDROCHLORIDE 5 MG: 5 TABLET, FILM COATED ORAL at 21:49

## 2018-08-01 RX ADMIN — MORPHINE SULFATE 2 MG: 2 INJECTION, SOLUTION INTRAMUSCULAR; INTRAVENOUS at 16:50

## 2018-08-01 RX ADMIN — ASPIRIN AND EXTENDED-RELEASE DIPYRIDAMOLE 1 CAPSULE: 25; 200 CAPSULE ORAL at 10:08

## 2018-08-01 RX ADMIN — AMLODIPINE BESYLATE 10 MG: 10 TABLET ORAL at 21:49

## 2018-08-01 RX ADMIN — INSULIN LISPRO 1 UNITS: 100 INJECTION, SOLUTION INTRAVENOUS; SUBCUTANEOUS at 13:16

## 2018-08-01 RX ADMIN — ENOXAPARIN SODIUM 40 MG: 40 INJECTION SUBCUTANEOUS at 15:44

## 2018-08-01 RX ADMIN — LISINOPRIL 10 MG: 5 TABLET ORAL at 10:12

## 2018-08-01 RX ADMIN — INSULIN LISPRO 1 UNITS: 100 INJECTION, SOLUTION INTRAVENOUS; SUBCUTANEOUS at 10:13

## 2018-08-01 NOTE — PLAN OF CARE
Problem: OCCUPATIONAL THERAPY ADULT  Goal: Performs self-care activities at highest level of function for planned discharge setting  See evaluation for individualized goals  Treatment Interventions: ADL retraining, Functional transfer training, UE strengthening/ROM, Endurance training, Cognitive reorientation, Patient/family training, Equipment evaluation/education, Compensatory technique education, Energy conservation, Activityengagement          See flowsheet documentation for full assessment, interventions and recommendations  Limitation: Decreased UE ROM, Decreased ADL status, Decreased UE strength, Decreased Safe judgement during ADL, Decreased cognition, Decreased endurance, Decreased self-care trans, Decreased high-level ADLs  Prognosis: Fair  Assessment: Pt is a 80 y o  male seen for OT evaluation s/p admit to [facility] on 7/31/2018 w/ fall at home and abdominal pain  Pt w/ Pancreatic cancer ([facility] )  Comorbidities affecting pt's functional performance at time of assessment include: Parkinson's disease, h/o stroke, HTN, liver mets, DM II  Personal factors affecting pt at time of IE include: impaired cognition, Macedonian speaking, increased fatigue  Pt was a poor historian unable to provide prior level of functioning  Prior to admission, pt was living w/ spouse and needs assist w/ ADLs and IADLs, supervision mobility w/ Rw  Will await CM note for further clarification   Upon evaluation: Pt requires MAX assist x2 supine<>sit bed mobility, MAX assist x2 sit<>stand w/ VCs for hand positioning and able to stand flex posture of knees and able to clear bottom from bed, MAX assist LB ADLs, MAX assist UB ADLs, MOD Assist UB ADLs 2* the following deficits impacting occupational performance: decreased strength and endurance, impaired balance, impaired activity tolerance, impaired functional reach, impaired speech w/ dysarthria and difficult to understand,impaired cognition (STM, insight, safety awareness, able to follow one step commands w/ repeated directions)  Pt to benefit from continued skilled OT tx while in the hospital to address deficits as defined above and maximize level of functional independence w ADL's and functional mobility  Occupational Performance areas to address include: grooming, bathing/shower, toilet hygiene, dressing, functional mobility, clothing management, cleaning and meal prep, home safety education  From OT standpoint, recommendation at time of d/c would be short term rehab at this time, however will monitor medical course regarding POC for appropriateness for therapy and at this time would require assist for all ADLs and functional transfers/mobility due to severe functional limitations and impaired cognition       OT Discharge Recommendation: Short Term Rehab (pending medical status)         Comments: Elyse Contreras MS, OTR/L

## 2018-08-01 NOTE — PROGRESS NOTES
[facility] Progress Note  Patient: Trisha Guillen 80 y o  male   MRN: [id number]  PCP: Becky Jean MD  Unit/Bed#: E4 -01 Encounter: [id number]  Date Of Visit: 08/01/18    Assessment:    Principal Problem:    Pancreatic cancer [facility])  Active Problems:    Dementia    Hypertension    Parkinson's disease ([facility] [address] )    History of CVA (cerebrovascular accident)    Diabetes mellitus, insulin dependent (IDDM), uncontrolled ([facility] )    Sleep disorder      Plan: This is an 80-year-old male with past medical history significant for hypertension, diabetes mellitus, Parkinson's, dementia, CVA admitted on 07/31/2018 for fall at home  1 )  Mechanical fall  -patient fell wall try to go to the bathroom via walker  -likely secondary to lethargy and recent weight loss  -no fractures, PT OT    2 )  Pancreatic mass  -imaging reveals 3 3 cm soft tissue mass at mid pancreatic body appears to be encasing adjacent celiac and superior mesenteric arteries, atrophy and marked ductal dilation of distal pancreas findings highly suspicious for malignancy  -family aware, family would not like palpation at this time  -had a detailed discussion with patient's daughter, Indigo Moulton, at bedside regarding diagnosis  -explained that given patient's age, weakness, unclear whether patient would tolerate further workup or treatment including biopsy and chemotherapy however will leave further discussion to oncologist  -family is interested in hospice but would like to speak to Oncology first    3 )  Hypertension  -continue with Norvasc and monitor blood pressure    4 )  Parkinson's dementia  -continue with Aricept and Sinemet    5 )  History of CVA  -continue Aggrenox    6 )  Diabetes mellitus  -continue home dose of Lantus, monitor Accu-Cheks, sliding scale for coverage      VTE Pharmacologic Prophylaxis:   Pharmacologic:  Lovenox    Patient Centered Rounds: I have performed bedside rounds with nursing staff today      Education and Discussions with Family / Patient: Daughter, Lady Doctor    Time Spent for Care: 20 minutes  More than 50% of total time spent on counseling and coordination of care as described above  Current Length of Stay: 1 day(s)    Current Patient Status: Inpatient   Certification Statement: The patient will continue to require additional inpatient hospital stay due to new diagnosis of pancreatic ca, weakness    Discharge Plan / Estimated Discharge Date: 2-3 days    Code Status: Level 3 - DNAR and DNI      Subjective:   Patient seen and examined at bedside, lying comfortably in bed, eating breakfast, Br[place]ian-speaking only  Daughter at bedside providing translation  Objective:     Vitals:   Temp (24hrs), Av 5 °F (35 8 °C), Min:94 6 °F (34 8 °C), Max:97 7 °F (36 5 °C)    HR:  [54-90] 72  Resp:  [18-20] 20  BP: (109-196)/(56-91) 130/62  SpO2:  [93 %-99 %] 96 %  Body mass index is 22 57 kg/m²  Input and Output Summary (last 24 hours): Intake/Output Summary (Last 24 hours) at 18 1342  Last data filed at 18 0400   Gross per 24 hour   Intake                0 ml   Output              250 ml   Net             -250 ml       Physical Exam:    Constitutional: Patient is in no acute distress  HEENT:  Normocephalic, atraumatic, EOMI, PERRLA, no scleral icterus, no pallor, moist oral mucosa  Neck:  Supple, no masses, no thyromegaly, no bruits Normal range of motion  Lymph nodes:  No lymphadenopathy  Cardiovascular: Normal S1S2, RRR, No murmurs/rubs/gallops appreciated  Pulmonary: Clear to auscultation bilaterally, No rhonchi/rales/wheezing appreciated  Abdominal: Soft, Bowel sounds present, Non-tender, Non-distended, No rebound/guarding, no hepatomegaly   Musculoskeletal: No tenderness/abnormality   Extremities:  No cyanosis, clubbing or edema  Peripheral pulses palpable and equal bilaterally  Neurological: Cranial nerves II-XII grossly intact, sensation intact, otherwise no focal neurological symptoms  Skin: Skin is warm and dry, no rashes  Additional Data:     Labs:      Results from last 7 days  Lab Units 08/01/18  0508 07/31/18  1751   WBC Thousand/uL 10 57* 8 95   HEMOGLOBIN g/dL 9 3* 9 2*   HEMATOCRIT % 30 3* 30 1*   PLATELETS Thousands/uL 187 171   NEUTROS PCT %  --  79*   LYMPHS PCT %  --  13*   MONOS PCT %  --  8   EOS PCT %  --  1       Results from last 7 days  Lab Units 08/01/18  0508   SODIUM mmol/L 141   POTASSIUM mmol/L 3 2*   CHLORIDE mmol/L 107   CO2 mmol/L 25   BUN mg/dL 18   CREATININE mg/dL 0 81   CALCIUM mg/dL 8 2*   TOTAL PROTEIN g/dL 6 4   BILIRUBIN TOTAL mg/dL 0 95   ALK PHOS U/L 341*   ALT U/L 9*   AST U/L 32   GLUCOSE RANDOM mg/dL 180*       Results from last 7 days  Lab Units 07/31/18  1751   INR  1 63*        I Have Reviewed All Lab Data Listed Above        Recent Cultures (last 7 days):           Last 24 Hours Medication List:     Current Facility-Administered Medications:  [START ON 8/2/2018] Amantadine HCl 50 mg Oral Daily Papito Washburn PA-C   amLODIPine 10 mg Oral HS Papito Washburn PA-C   aspirin-dipyridamole 1 capsule Oral BID Papito Washburn PA-C   Carbidopa-Levodopa ER 1 capsule Oral 9 times per day Gerri Mcclure MD   citalopram 20 mg Oral Daily Papito Washburn PA-C   donepezil 5 mg Oral HS Papito Washburn PA-C   hydrALAZINE 5 mg Intravenous Q6H PRN SANCHEZ Desir   insulin glargine 10 Units Subcutaneous HS Papito Washburn PA-C   insulin lispro 1-5 Units Subcutaneous TID AC Papito Washburn PA-C   insulin lispro 1-5 Units Subcutaneous HS Papito Washburn PA-C   lisinopril 10 mg Oral Daily Papito Washburn PA-C   morphine injection 2 mg Intravenous Q4H PRN Papito Washburn PA-C   OLANZapine 20 mg Oral HS Papito Washburn PA-C   ondansetron 4 mg Intravenous Q6H PRN Papito Washburn PA-C   pantoprazole 40 mg Oral Early Morning Papito Washburn PA-C        Today, Patient Was Seen By: Gerri Mcclure MD

## 2018-08-01 NOTE — PHYSICAL THERAPY NOTE
PT EVALUATION    80 y o     [id number]    Pancreatic cancer ([facility] [address] ) [C25 9]  Leg swelling [M79 89]  Generalized weakness [R53 1]  Hypothermia, initial encounter [T68  XXXA]    Past Medical History:   Diagnosis Date    Dementia     Diabetes mellitus, type 2 ([facility] )     last assessed 10/13/14    Hypertension     Iron deficiency     Parkinson's disease ([facility] )     Pneumonia     community aquired /last assessed 1/26/17    Stroke ([place] [address] )     Vitamin D deficiency          Past Surgical History:   Procedure Laterality Date    HALLUX VALGUS CORRECTION        08/01/18 0833   Note Type   Note type Eval only   Pain Assessment   Pain Assessment No/denies pain   Home Living   Type of [facility] to live on main level with bedroom/bathroom  (denies SAILAJA)   Bathroom Shower/Tub (unable to state)   [address]; Shower chair  (previous notes indicate + shower chair)   [address] [facility]; Hospital bed; Wheelchair-manual  (per previous notes hosp bd ordered for home, + RW and WC)   Additional Comments Poor historian  Speech difficult to understand  Stating has no stairs, one floor living with spouse  Notes indicate home health aide 6 hours daily  Prior Function   Level of [place] Needs assistance with ADLs and functional mobility   Lives With Spouse   Receives Help From Family   ADL Assistance Needs assistance   IADLs Needs assistance   Falls in the last 6 months 1 to 4  (PT eval 2017 ( hx 3 falls), 1 fall leading to admission )   Vocational Retired   Comments Reports ambulates short distances with RW  Poor historian and speech unintelligible at times  Restrictions/Precautions   Weight Bearing Precautions Per Order No   Other Precautions Cognitive; Chair Alarm; Bed Alarm; Fall Risk;Pain  (language barrier-Martiniquais Speaking )   General   Additional Pertinent History Pt is 79 y/o male admitted after being found down following a fall   New dx of pancreatic mass  PT consulted  Activity as tolerated  Family/Caregiver Present No   Cognition   Overall Cognitive Status Impaired   Arousal/Participation Responsive   Orientation Level Oriented to person   Following Commands Follows one step commands with increased time or repetition   Comments Kyrgyz speaking, poor historian, difficulty with undertanding speech  RUE Assessment   RUE Assessment WFL  (PROM, difficulty getting formal assessment)   LUE Assessment   LUE Assessment WFL  (PROM, difficulty obtaining formal assessment )   RLE Assessment   RLE Assessment X  (limited knee extension, unable to formally assess strength)   LLE Assessment   LLE Assessment X  (limited knee extension, unable to obtain formal assessment )   Coordination   Movements are Fluid and Coordinated 0   Coordination and Movement Description 2* observed generalized weakness  Bed Mobility   Supine to Sit 2  Maximal assistance   Additional items Assist x 2; Increased time required; Bedrails;HOB elevated;LE management;Verbal cues   Sit to Supine 2  Maximal assistance   Additional items Assist x 2; Increased time required;LE management;Verbal cues   Additional Comments Increasd time to reach EOB  EOB sitting 7 minutes until fatigue noteable with decreased sitting balance  Transfers   Sit to Stand 2  Maximal assistance   Additional items Assist x 3; Increased time required;Verbal cues  (unable to complete, flexed posture, inc knee flexion )   Stand to Sit 2  Maximal assistance   Additional items Assist x 2   Additional Comments unable to acheive full standing with RW support  Acheived 50% of way into standing with max A of 2  Flexed posture and increased b/L knee flexion noted with limited effort     Ambulation/Elevation   Gait pattern Not appropriate  (given inabiltiy to acheive standing with 2 attempts )   Assistive Device Rolling walker   Distance 0   Balance   Static Sitting Fair -  (fair to fair- as increased fatigue noted )   Dynamic Sitting Poor +   Static Standing Zero   Endurance Deficit   Endurance Deficit Yes   Endurance Deficit Description weakness, fatigue   Activity Tolerance   Activity Tolerance Patient limited by fatigue; Other (Comment)  (cognition)   Medical Staff Made Aware NurseMichael   Nurse Made Aware yes   Assessment   Prognosis Guarded  (given medical diagnosis )   Problem List Decreased strength;Decreased range of motion;Decreased endurance; Impaired balance;Decreased mobility; Decreased coordination;Decreased cognition; Impaired judgement;Decreased safety awareness   Assessment Pt is 81 y/o male admitted after being found down at home following a fall  Upon testing found to have newly diagnosed pancreatic CA and awaiting oncology evaluation  PT consulted with orders activity as tolerated  Pt seen at bedside  [place] speaking only, poor historian and mumbling speech and unintelligible at times  Pt notes baseline ambulation with RW support short distances in home  Stating resides in one floor accomodations with spouse and does not perform stairs  Currently presents with decreased general strength, activity tolerance, balance, ROM limitations in b/l knees, and functional mobility  Impaired cognition as oriented to self only at this time  Given impairments will benefit from skilled PT in order to progress and optimize functional mobility while lessening caregiver support  May require rehab at this time given impairments, however will monitor medical course and decisions regarding POC for continued appropriateness for PT intervention  Ultimately requiring 24* assistance at this time given severe functional limitations  Will follow and progress as appropriate as well as monitor and assist with d/c planning  Barriers to Discharge Comments medical prognosis? Goals   Patient Goals none expressed 2* cognitive impairments     STG Expiration Date 08/11/18   Short Term Goal #1 10 days:  1) Pt will perform bed mobility with Lilian of 1 in order to optimize mobility while lessening caregiver assistance in his home environment  2)  Improve activity tolerance to 30 minutes in order to improve endurance to functional tasks  3)  Complete sit to stand transfer with modA of 1 using RW support in order to improve functional transfer ability while lessening caregiver assistance for ADL performance  4)  Improve standing tolerance to 2 minutes in order to improve endurance for ADL management  5) increase LE strength and balance 1/2 grade in order to improve ability to participate with functional mobility and decrease risk for falls  6) PT to continue to assist with DME recommendations, pt/caregiver education and d/c planning in order to achieve highest level of function in least restrictive environment  7) PT to see for ambulation assessment once previously listed goals achieved  Treatment Day 0   Plan   Treatment/Interventions Functional transfer training;LE strengthening/ROM; Therapeutic exercise; Endurance training;Patient/family training;Equipment eval/education; Bed mobility;Gait training; Compensatory technique education;Continued evaluation;Spoke to nursing;OT   PT Frequency 2-3x/wk  (monitor POC to increase if appropriate )   Recommendation   Recommendation 24 hour supervision/assist  (rhb vs 24* care pending medical decisions )   Equipment Recommended (monitor)   Additional Comments Pt will require rhb vs environment with 24* care     Modified [place] Scale   Modified [place] Scale 4   Barthel Index   Feeding 5   Bathing 0   Grooming Score 0   Dressing Score 0   Bladder Score 5   Bowels Score 5   Toilet Use Score 0   Transfers (Bed/Chair) Score 5   Mobility (Level Surface) Score 0   Stairs Score 0   Barthel Index Score 20     History: co - morbidities, fall risk, use of assistive device, assist for adl's, cognition, multiple lines  Exam: impairments in locomotion, musculoskeletal, balance,posture, joint integrity, skin integrity, cognition   Clinical: unstable/unpredictable ( fall risk, medical prognosis, continued workup with oncology eval pending)  Complexity:high    Lutricia Edu, PT

## 2018-08-01 NOTE — PROGRESS NOTES
Patient was refusing scheduled medications, including blood pressure medication  BP was elevated at this time  SLIM admitting NP was paged and she ordered a IV PRN hydralazine for pt's blood pressure  Prior to administrating the IV PRN hydralazine, patient's daughter called around 46 and talked to patient about his medications, and he took them at that time  Will monitor patient's BP after administration of PO BP meds and will administer PRN if needed

## 2018-08-01 NOTE — MALNUTRITION/BMI
This medical record reflects one or more clinical indicators suggestive of malnutrition and/or morbid obesity  Malnutrition Findings:   Malnutrition type: Chronic illness  Degree of Malnutrition: Other severe protein calorie malnutrition (acute on chronic severe pro, curt malnutrition d/t condition, CA as evidence by severe muscle and fat loss of temporals, clavicles, triceps, 5 5% x 1 month, hx wt loss over 3 years)  Malnutrition Characteristics:  (treated w/ supplements, snacks, pt requiring assistance at meals  )    BMI Findings: Body mass index is 22 57 kg/m²  See Nutrition note dated 8/1/18 for additional details  Completed nutrition assessment is viewable in the nutrition documentation

## 2018-08-01 NOTE — CONSULTS
Consulted for 60lbs wt loss  Reviewed wt hx: 7/29/16 136lbs, 11/4/16 136lbs, 1/6/17 133lbs, 7/21/17 125lbs, 12/22/17 112lbs, 4/25/18 110lbs, 6/22/18 110lbs, current wt 104lbs  17% wt loss x 1 year from 7/21/17 to 7/31/18 (not considered significant), past month pt showing 5 5% wt loss (significant)  Diet hx: pt's daughter explained pt does well when fed, pt gets frustrated when he tries to eat on his own and spills things, pt's daughter explained pt has parkinsons and difficulty w/ feeding self, reported no concerns w/ chewing or swallowing  B: glucerna shake, eggs or pancakes, peaches, banana, L: chicken and broccoli or tuna sandwich D: chicken, rice, beans    Discussed w/ daughter ideas for snacks w/ protein in between meals to prevent further wt loss and increasing glucerna to BID  Will add glucerna for B and L and banana w/ pb for HS snack

## 2018-08-01 NOTE — OCCUPATIONAL THERAPY NOTE
[address]  Evaluation     Patient Name: Miya Ramirez  [id number] Date: 8/1/2018  Problem List  Patient Active Problem List   Diagnosis    Dementia    Hypertension    Parkinson's disease ([facility] )    History of CVA (cerebrovascular accident)    Acute tracheobronchitis    Ambulatory dysfunction    Diabetes mellitus, insulin dependent (IDDM), uncontrolled ([facility] )    Blister of left foot    Acute urinary retention    LUQ pain    Benign prostatic hyperplasia with nocturia    Sleep disorder    Skin cancer    Stroke syndrome (HCC)    Tremor    Staggering gait    Peripheral neuropathy    Orthostatic hypotension    Chronic midline low back pain    Dizziness and giddiness    Lower abdominal pain    Pancreatic cancer [facility])     Past Medical History  Past Medical History:   Diagnosis Date    Dementia     Diabetes mellitus, type 2 ([place] [address] )     last assessed 10/13/14    Hypertension     Iron deficiency     Parkinson's disease ([place] [address] )     Pneumonia     community aquired /last assessed 1/26/17    Stroke ([place] [address] )     Vitamin D deficiency      Past Surgical History  Past Surgical History:   Procedure Laterality Date    HALLUX VALGUS CORRECTION             08/01/18 0834   Note Type   Note type Eval/Treat   Restrictions/Precautions   Weight Bearing Precautions Per Order No   Other Precautions Cognitive; Chair Alarm; Bed Alarm;Multiple lines; Fall Risk;Pain  (Serbian speaking)   Pain Assessment   Pain Assessment No/denies pain   Pain Score No Pain   Home Living   Type of Home House   Home Layout Two level; One level   Bathroom Shower/Tub (unable to report)   [address]; [address]; Hospital bed   Additional Comments pt is a poor historian unable to provide home setup ; will await CM notes for further home setup   Prior Function   Level of [place] Needs assistance with IADLs; Needs assistance with ADLs and functional mobility   Lives With Spouse   Receives Help From Family   ADL Assistance Needs assistance   IADLs Needs assistance   Falls in the last 6 months 1 to 4  (atleast 1 per chart)   Vocational Retired   Comments pt poor historian unable to provide h/o, has home aides for him and his wife and family assists   Lifestyle   Autonomy per pt setup self-feeding, assist ADLs, assist IADLs, supervision functional transfers and mobility w/ RW   Reciprocal Relationships spouse and children   Service to Others retired unable to report   Intrinsic Gratification watch tv   ADL   Where Assessed Edge of bed   Eating Assistance 4  Minimal Assistance   Grooming Assistance 3  Moderate Assistance   UB Bathing Assistance 2  Maximal Assistance   LB [address] 2  Maximal Assistance   [address] 2  Maximal Assistance    [facility] [address] 2  Maximal [address]  2  Maximal Assistance   Bed Mobility   Supine to Sit 2  Maximal assistance   Additional items Assist x 2; Increased time required;LE management;Verbal cues; Bedrails;HOB elevated   Sit to Supine 2  Maximal assistance   Additional items Assist x 2; Increased time required;Verbal cues;LE management; Bedrails   Additional Comments increased time and use of bed rails   Transfers   Sit to Stand 2  Maximal assistance   Additional items Assist x 2; Increased time required;Verbal cues  (stand w/ flexed knees)   Stand to Sit 2  Maximal assistance   Additional items Assist x 2; Increased time required;Verbal cues   Additional Comments pt able to clear bottom from bed w/ flexed posture; cues for hand placement   Balance   Static Sitting Fair -  (fluxuates Fair/Fair-)   Dynamic Sitting Poor +   Static Standing Zero   Activity Tolerance   Activity Tolerance Patient limited by fatigue;Patient limited by pain  (cognition)   Nurse Made Aware appropriate to see per Chacha MCFARLANE Assessment   RUE Assessment X  (limited shoulder elevation, grossly 3/5 PROM WFL, resistive)   LUE Assessment   LUE Assessment X  (limited shoulder elevation, grossly 3/5, resistive PROM WFL)   Hand Function   Gross Motor Coordination Functional   Sensation   Light Touch Not tested   Vision-Basic Assessment   Current Vision No visual deficits   Vision - Complex Assessment   Ocular Range of Motion WFL   Cognition   Overall Cognitive Status Impaired   Arousal/Participation Persistent stimuli required;Poorly responsive   Attention Difficulty attending to directions   Orientation Level Oriented to person;Disoriented to place; Disoriented to time;Disoriented to situation  (able to state his birthday)   Memory Decreased short term memory;Decreased recall of precautions;Decreased recall of recent events;Decreased recall of biographical information   Following Commands Follows one step commands with increased time or repetition   Comments pt w/ Dementia at baseline, increased time, cuing, repeated directions for tasks; decreased initiative; speech is dysarthric and unintelligable at times   Assessment   Limitation Decreased UE ROM; Decreased ADL status; Decreased UE strength;Decreased Safe judgement during ADL;Decreased cognition;Decreased endurance;Decreased self-care trans;Decreased high-level ADLs   Prognosis Fair   Assessment Pt is a 80 y o  male seen for OT evaluation s/p admit to [facility] on 7/31/2018 w/ fall at home and abdominal pain  Pt w/ Pancreatic cancer ([facility] [address] [facility] )  Comorbidities affecting pt's functional performance at time of assessment include: Parkinson's disease, h/o stroke, HTN, liver mets, DM II  Personal factors affecting pt at time of IE include: impaired cognition, Swedish speaking, increased fatigue  Pt was a poor historian unable to provide prior level of functioning  Prior to admission, pt was living w/ spouse and needs assist w/ ADLs and IADLs, supervision mobility w/ Rw  Will await CM note for further clarification   Upon evaluation: Pt requires MAX assist x2 supine<>sit bed mobility, MAX assist x2 sit<>stand w/ VCs for hand positioning and able to stand flex posture of knees and able to clear bottom from bed, MAX assist LB ADLs, MAX assist UB ADLs, MOD Assist UB ADLs 2* the following deficits impacting occupational performance: decreased strength and endurance, impaired balance, impaired activity tolerance, impaired functional reach, impaired speech w/ dysarthria and difficult to understand,impaired cognition (STM, insight, safety awareness, able to follow one step commands w/ repeated directions)  Pt to benefit from continued skilled OT tx while in the hospital to address deficits as defined above and maximize level of functional independence w ADL's and functional mobility  Occupational Performance areas to address include: grooming, bathing/shower, toilet hygiene, dressing, functional mobility, clothing management, cleaning and meal prep, home safety education  From OT standpoint, recommendation at time of d/c would be short term rehab at this time, however will monitor medical course regarding POC for appropriateness for therapy and at this time would require assist for all ADLs and functional transfers/mobility due to severe functional limitations and impaired cognition  Goals   Patient Goals none expressed 2* impaired cognition   LTG Time Frame 10-14   Long Term Goal please see below goals   Plan   Treatment Interventions ADL retraining;Functional transfer training;UE strengthening/ROM; Endurance training;Cognitive reorientation;Patient/family training;Equipment evaluation/education; Compensatory technique education; Energy conservation; Activityengagement   Goal Expiration Date 08/15/18   OT Frequency 2-3x/wk   Recommendation   OT Discharge Recommendation Short Term Rehab  (pending medical status)   Barthel Index   Feeding 5   Bathing 0   Grooming Score 0   Dressing Score 0   Bladder Score 5   Bowels Score 5   Toilet Use Score 0   Transfers (Bed/Chair) Score 5   Mobility (Level Surface) Score 0   Stairs Score 0   Barthel Index Score 20   Modified Whiting Scale   Modified Whiting Scale 4     Occupational Therapy Goals to be met in 10-14 days:  1) Pt will improve activity tolerance to G for min 20 min txment sessions to enhance ADLs  2) Pt will complete UB ADLs/self care w/ min assist and mod assist LB ADLS  3) Pt will complete toileting w/ mod assist w/ G hygiene/thoroughness using DME PRN  4) Pt will improve functional transfers on/off all surfaces using DME PRN w/ G balance/safety including toileting w/ mod assist  5) Pt will improve fx'l mobility during I/ADl/leisure tasks using DME PRN w/ g balance/safety w/ mod assist  6) Pt will engage in ongoing cognitive assessment w/ G participation to A w/ safe d/c planning/recommendations  7) Pt/caregiver will demonstrate G carryover of pt/caregiver education and training as appropriate w/ G tolerance  8) Pt will demonstrate 100% carryover of E C  techniques w/ mod I t/o fx'l I/ADL/leisure tasks w/o cues s/p skilled education  9) Pt will tolerate bed mobility and EOB seated tasks w/ min A for 30 mins to engage in fx'l I/ADL/leisure tasks w/ min A w/ min cues to enhance ADLs  10) Pt will demonstrate improved b/l UE strength by 1 MMT grade to enhance ADLS and functional transfers  11) Pt will tolerate sitting upright for all meals to perform self-feeding w/ setup      Documentation completed by: Genaro Gruber MS, OTR/L

## 2018-08-01 NOTE — CASE MANAGEMENT
Initial Clinical Review    Admission: Date/Time/Statement: 7/31/18 @ 1949     Orders Placed This Encounter   Procedures    Inpatient Admission (expected length of stay for this patient is greater than two midnights)     Standing Status:   Standing     Number of Occurrences:   1     Order Specific Question:   Admitting Physician     Answer:   Angelique Jules     Order Specific Question:   Level of Care     Answer:   Med Surg [16]     Order Specific Question:   Estimated length of stay     Answer:   More than 2 Midnights     Order Specific Question:   Certification     Answer:   I certify that inpatient services are medically necessary for this patient for a duration of greater than two midnights  See H&P and MD Progress Notes for additional information about the patient's course of treatment  ED: Date/Time/Mode of Arrival:   ED Arrival Information     Expected Arrival Acuity Means of Arrival Escorted By Service Admission Type    - 7/31/2018 17:09 Urgent Walk-In Family Member General Medicine Urgent    Arrival Complaint    Fall, Leg Swelling          Chief Complaint:   Chief Complaint   Patient presents with    Fall     Patient presents with daughter, reports patient was found on ground today following fall by home health aid  Pt was covered in stool, per daughter  Shares that he's had increased weakness, but denies any new mental status changes or confusion  Hx of parkinsons   Leg Swelling     Reports bilateral lower extremity edema to the point of "not being able to put his shoes on"  History of Illness: 80year old male presents for evaluation of generalized weakness and fall that occurred today  Patient's daughter is at bedside and helps provide the history  Patient has a hx of dementia and parkinsons  Daughter states that patient was found on the ground today by visiting home health aid  Unknown down time    Daughter states he complained of abdominal pain intermittently for the last 3 weeks     ED Vital Signs:   ED Triage Vitals   Temperature Pulse Respirations Blood Pressure SpO2   07/31/18 1717 07/31/18 1716 07/31/18 1716 07/31/18 1716 07/31/18 1716   (!) 96 9 °F (36 1 °C) (!) 54 18 109/56 99 %      Temp Source Heart Rate Source Patient Position - Orthostatic VS BP Location FiO2 (%)   07/31/18 1717 07/31/18 1716 07/31/18 1716 07/31/18 1716 --   Temporal Monitor Sitting Left arm       Pain Score       07/31/18 1716       5        Wt Readings from Last 1 Encounters:   07/31/18 47 3 kg (104 lb 4 4 oz)       Vital Signs (abnormal):   Date and Time Temp Pulse SpO2 Resp BP   07/31/18 2109 -- 69 93 % 19  172/77   07/31/18 1923 -- 71 96 % 18  195/91     Abnormal Labs/Diagnostic Test Results: Glucose 197, AST 48, Alk Phos 342, H/H 9 2/30 1, Neutro PCT 79    CT of Abd/Pelvis: 3 3 cm soft tissue mass at the mid pancreatic body which appears to encase the adjacent celiac and superior mesenteric arteries   There is atrophy and marked ductal dilatation of the distal pancreas   The findings are highly suspicious for malignancy       Heterogeneous enhancement of the liver with multiple ill-defined lesions measuring up to 3 9 cm suspicious for metastatic disease       Small to moderate amount of intra-abdominal and pelvic ascites   No free intraperitoneal air  Scattered colonic diverticulosis with no inflammatory changes present to suggest acute diverticulitis  Scattered subcentimeter nodules noted at both lung bases suspicious for metastatic disease   A few peripheral ground glass opacities are also noted at the right lung base suspicious for an infectious or inflammatory process   Trace bilateral pleural effusions       CT of Cervical Spine: No cervical spine fracture or traumatic malalignment  Moderately severe multilevel cervical spondylosis      ED Treatment:   Medication Administration from 07/31/2018 1709 to 07/31/2018 2116       Date/Time Order Dose Route Action Action by Comments     07/31/2018 1813 sodium chloride 0 9 % bolus 1,000 mL 1,000 mL Intravenous [address] Trudy Culp RN      07/31/2018 1905 iohexol (OMNIPAQUE) 350 MG/ML injection (SINGLE-DOSE) 80 mL 80 mL Intravenous Given Nitin Finders           Past Medical/Surgical History: Active Ambulatory Problems     Diagnosis Date Noted    Dementia     Hypertension     [facility]'s disease ([facility] [address] )     History of CVA (cerebrovascular accident)     Acute tracheobronchitis 01/06/2017    Ambulatory dysfunction 01/06/2017    Diabetes mellitus, insulin dependent (IDDM), uncontrolled ([facility] ) 01/16/2017    Blister of left foot 01/16/2017    Acute urinary retention 01/16/2017    LUQ pain 02/21/2018    Benign prostatic hyperplasia with nocturia 02/21/2018    Sleep disorder 10/28/2013    Skin cancer 10/28/2013    Stroke syndrome ([facility] ) 07/31/2012    Tremor 04/15/2016    Staggering gait 10/28/2013    Peripheral neuropathy 11/06/2012    Orthostatic hypotension 10/28/2013    Chronic midline low back pain 04/25/2018    Dizziness and giddiness 04/25/2018    Lower abdominal pain 06/22/2018     Resolved Ambulatory Problems     Diagnosis Date Noted    No Resolved Ambulatory Problems     Past Medical History:   Diagnosis Date    Dementia     Diabetes mellitus, type 2 ([facility] )     Hypertension     Iron deficiency     Parkinson's disease ([facility]     Pneumonia     Stroke ([facility] )     Vitamin D deficiency        Admitting Diagnosis: Pancreatic cancer ([facility] ) [C25 9]  Leg swelling [M79 89]  Generalized weakness [R53 1]  Hypothermia, initial encounter [T68  XXXA]    Age/Sex: 80 y o  male    Assessment/Plan:     Hospital Problem List:      Principal Problem:    Pancreatic cancer ([facility] )  Active Problems:    Dementia    Hypertension    Parkinson's disease ([facility] )    History of CVA (cerebrovascular accident)    Diabetes mellitus, insulin dependent (IDDM), uncontrolled ([facility] )    Sleep disorder        Plan for the Primary Problem(s):  · Pancreatic cancer-newly diagnosed  ? Admit to med/surg  ? Family (daughter and son) at bedside  Lengthy discussion with family about CT scan results  Family feeling overwhelmed with new diagnosis  Provided emotional support  At this point patient is not aware of the diagnosis as he is Turkmen speaking only  Family would like to not tell him at this time  ? Discussed code status  He was historically full code and will remain the same overnight  Family knows they will likely change code status in near future but they want to speak with oncology first  ? Additional diagnostic studies will be deferred to oncology  ? Family had recent experience with hospice for another family member and they were pleased with the hospice experience  They are will to consider home care hospice after consult with oncology  Will also consult palliative care service      Plan for Additional Problems:   · Dementia- patient is still able to recognize and communicate with family in [address]  Continue aricept  · HTN- BP high in ED  Due for norvasc  Will monitor BP closely  May need PRN  · parkinsons- on rytary at home  Spoke with pharmacy as we do not have rytary here  Dose cannot be converted  Will give a dose of sinemet tonight  Family will bring in rytary tomorrow  · Hx CVA- on aggrenox  · DM- continue home lantus dose  Order accuchecks  Monitor blood sugar closely with decreased PO intake  · Sleep disorder- on zyprexa at home  · Liver mets- avoid anticoagulation with elevated PT/INR  SCD's ordered for DVT prophylaxis     VTE Prophylaxis: Pharmacologic VTE Prophylaxis contraindicated due to liver mets  / sequential compression device   Code Status: full code  POLST: There is no POLST form on file for this patient (pre-hospital)     Anticipated Length of Stay:  Patient will be admitted on an Inpatient basis with an anticipated length of stay of  Greater than 2 midnights     Justification for Hospital Stay: patient requires oncology       Admission Orders:  Inpatient/MedSurg  Consult Onco r/e pancreatic cancer  Consult Palliative Care r/e new dx pancreatic cancer   Bilateral Sequential Compression Device  OT/PT Consult   SSI    Scheduled Meds:   Current Facility-Administered Medications:  amantadine 100 mg Oral Daily   amLODIPine 10 mg Oral HS   aspirin-dipyridamole 1 capsule Oral BID   Carbidopa-Levodopa ER 1 capsule Oral 9 times per day   citalopram 20 mg Oral Daily   donepezil 5 mg Oral HS   insulin glargine 10 Units Subcutaneous HS   insulin lispro 1-5 Units Subcutaneous TID AC   insulin lispro 1-5 Units Subcutaneous HS   lisinopril 10 mg Oral Daily   OLANZapine 20 mg Oral HS   pantoprazole 40 mg Oral Early Morning     PRN Meds: hydrALAZINE    morphine injection    ondansetron

## 2018-08-01 NOTE — TELEPHONE ENCOUNTER
Patient's daughter called to make you aware that patient was admitted to the hospital and was diagnosed with pancreatic cancer  Meeting with oncologist tonight

## 2018-08-01 NOTE — H&P
History and Physical - [facility] Internal Medicine    Patient Information: Debby Gilmore 80 y o  male MRN: [id number]  Unit/Bed#: E4 -01 Encounter: [id number]  Admitting Physician: Luli Garrido PA-C  PCP: Jaspal Beck MD  Date of Admission:  07/31/18    Assessment/Plan:    Hospital Problem List:     Principal Problem:    Pancreatic cancer [facility])  Active Problems:    Dementia    Hypertension    Parkinson's disease ([facility] [address] )    History of CVA (cerebrovascular accident)    Diabetes mellitus, insulin dependent (IDDM), uncontrolled ([facility] )    Sleep disorder      Plan for the Primary Problem(s):  · Pancreatic cancer-newly diagnosed  · Admit to med/surg  · Family (daughter and son) at bedside  Lengthy discussion with family about CT scan results  Family feeling overwhelmed with new diagnosis  Provided emotional support  At this point patient is not aware of the diagnosis as he is Ethiopian speaking only  Family would like to not tell him at this time  · Discussed code status  He was historically full code and will remain the same overnight  Family knows they will likely change code status in near future but they want to speak with oncology first  · Additional diagnostic studies will be deferred to oncology  · Family had recent experience with hospice for another family member and they were pleased with the hospice experience  They are will to consider home care hospice after consult with oncology  Will also consult palliative care service  Plan for Additional Problems:   · Dementia- patient is still able to recognize and communicate with family in [address]  Continue aricept  · HTN- BP high in ED  Due for norvasc  Will monitor BP closely  May need PRN  · parkinsons- on rytary at home  Spoke with pharmacy as we do not have rytary here  Dose cannot be converted  Will give a dose of sinemet tonight  Family will bring in rytary tomorrow  · Hx CVA- on aggrenox  · DM- continue home lantus dose  Order accuchecks   Monitor blood sugar closely with decreased PO intake  · Sleep disorder- on zyprexa at home  · Liver mets- avoid anticoagulation with elevated PT/INR  SCD's ordered for DVT prophylaxis    VTE Prophylaxis: Pharmacologic VTE Prophylaxis contraindicated due to liver mets  / sequential compression device   Code Status: full code  POLST: There is no POLST form on file for this patient (pre-hospital)    Anticipated Length of Stay:  Patient will be admitted on an Inpatient basis with an anticipated length of stay of  Greater than 2 midnights  Justification for Hospital Stay: patient requires oncology    Total Time for Visit, including Counseling / Coordination of Care: 90   Greater than 50% of this total time spent on direct patient counseling and coordination of care  Chief Complaint:   Fall at home    History of Present Illness:    Xochilt Rocha is a 80 y o  male who presents with fall at home  Patient presented to the ED after a fall at home  daughter states she put him to bed last night and he was found on the floor this morning by his daytime caretaker  Daughter lives one minute away from patient  He is Maced[place] speaking only  Daughter at bedside  Patient lives at home with his wife who is deaf and had a prior head trauma  Until recently patient was caring for his wife  Family noticed that he has been declining over the past year  He has had decreased PO intake  He has lost 60 pounds in the past year  Family thought it was due to his parkinsons and all of his medications  He has been complaining of intermittent abdominal discomfort for a few months  He was seen by his PCP  An abdominal ultrasound was ordered but not done yet  He also complains at time of an "asthma" sensation when lying flat  He was recently given a hospital bed for home use  Review of Systems:    Review of Systems   Constitutional: Positive for activity change, appetite change, fatigue and unexpected weight change  HENT: Negative      Eyes: Negative  Respiratory: Negative  Cardiovascular: Negative  Gastrointestinal: Positive for abdominal pain  Endocrine: Negative  Genitourinary: Negative  Musculoskeletal: Negative  Skin: Negative  Allergic/Immunologic: Negative  Neurological: Positive for dizziness  Hematological: Negative  Psychiatric/Behavioral: Negative  Past Medical and Surgical History:     Past Medical History:   Diagnosis Date    Dementia     Diabetes mellitus, type 2 ([facility] )     last assessed 10/13/14    Hypertension     Iron deficiency     Parkinson's disease ([place] [address] )     Pneumonia     community aquired /last assessed 1/26/17    Stroke ([place] [address] )     Vitamin D deficiency        Past Surgical History:   Procedure Laterality Date    HALLUX VALGUS CORRECTION         Meds/Allergies:    Prior to Admission medications    Medication Sig Start Date End Date Taking?  Authorizing Provider   amantadine (SYMMETREL) 100 mg capsule Take 1 capsule (100 mg total) by mouth 2 (two) times a day 4/25/18   Jacquie Bobo DO   amLODIPine (NORVASC) 10 mg tablet Take 1 tablet (10 mg total) by mouth daily 5/15/18   Kirill Barboza MD   aspirin-dipyridamole (AGGRENOX)  mg per 12 hr capsule Take 1 capsule by mouth 2 (two) times a day    Historical Provider, MD   Carbidopa-Levodopa ER (RYTARY) 61  MG CPCR 1 po 9 times a day 7/20/18   Keerthi Urbano DO   Cholecalciferol (VITAMIN D) 2000 units tablet Take 1 tablet (2,000 Units total) by mouth daily 2/21/18   Kirill Barboza MD   citalopram (CeleXA) 20 mg tablet Take 1 tablet (20 mg total) by mouth daily 4/25/18   Jacquie Bobo DO   donepezil (ARICEPT) 5 mg tablet TAKE 1 TABLET BY MOUTH EVERY DAY 7/16/18   Luis Manuel Mcdonald PA-C   FREESTYLE LITE test strip TEST TWICE DAILY 5/14/18   Kirill Barboza MD   Incontinence Supply Disposable (DEPEND UNDERWEAR SM/MED) MISC by Does not apply route 4 (four) times a day 6/13/18   Kirill Barboza MD   Incontinence Supply Disposable (UNDERPADS) MISC by Does not apply route once a week Please dispense reusable/washable pads 6/13/18   Andreea Manning MD   insulin glargine (LANTUS) 100 units/mL subcutaneous injection Inject 20 Units under the skin daily at bedtime 2/21/18   Andreea Maninng MD   Insulin Pen Needle (NOVOFINE PLUS) 32G X 4 MM MISC by Does not apply route daily Use 1 daily 3/14/18   Evelio Gray PA-C   lisinopril (ZESTRIL) 20 mg tablet Take 1 tablet (20 mg total) by mouth daily  Patient taking differently: Take 10 mg by mouth daily   2/21/18   Andreea Manning MD   loratadine (CLARITIN) 10 mg tablet Take 10 mg by mouth daily    Historical Provider, MD   Nutritional Supplements ([address] [place] [address] Dr) Take by mouth 10/20/17   Historical Provider, MD   OLANZapine (ZyPREXA) 20 MG tablet Take 1 tablet (20 mg total) by mouth daily at bedtime 4/25/18   Jacquie Bobo,    omeprazole (PriLOSEC) 20 mg delayed release capsule Take 20 mg by mouth daily    Historical Provider, MD   ondansetron (ZOFRAN) 4 mg tablet Take 1 tablet by mouth every 12 (twelve) hours as needed for nausea or vomiting 1/17/17   Monica De Oliveira,    tamsulosin (FLOMAX) 0 4 mg Take 1 capsule (0 4 mg total) by mouth daily with dinner 7/6/18   Andreea Manning MD     I have reviewed home medications with patient personally  Allergies:    Allergies   Allergen Reactions    Mirapex [Pramipexole] Other (See Comments)     hallucinations       Social History:     Marital Status: /Civil Union   Occupation: retired  Patient Pre-hospital Living Situation: lives with wife  Patient Pre-hospital Level of Mobility: ambulatory  Patient Pre-hospital Diet Restrictions: none  Substance Use History:   History   Alcohol Use No     History   Smoking Status    Never Smoker   Smokeless Tobacco    Never Used     History   Drug Use No       Family History:    non-contributory    Physical Exam:     Vitals:   Blood Pressure: (!) 172/77 (07/31/18 2109)  Pulse: 69 (07/31/18 2109)  Temperature: (!) 94 6 °F (34 8 °C) (07/31/18 1834)  Temp Source: Rectal (07/31/18 1834)  Respirations: 19 (07/31/18 2109)  Weight - Scale: 49 9 kg (110 lb) (07/31/18 1716)  SpO2: 93 % (07/31/18 2109)    Physical Exam   Constitutional: He is oriented to person, place, and time  No distress  Ill appearing   HENT:   Head: Normocephalic and atraumatic  Eyes: Conjunctivae are normal  Pupils are equal, round, and reactive to light  Neck: Normal range of motion  Neck supple  No JVD present  No tracheal deviation present  No thyromegaly present  Cardiovascular: Normal rate and regular rhythm  Pulmonary/Chest: Effort normal and breath sounds normal  No respiratory distress  He has no wheezes  Abdominal: Bowel sounds are normal    Abdomen firm  Generalized tenderness   Musculoskeletal: Normal range of motion  He exhibits no edema, tenderness or deformity  Lymphadenopathy:     He has no cervical adenopathy  Neurological: He is alert and oriented to person, place, and time  Skin: Skin is warm and dry  He is not diaphoretic  No erythema  Psychiatric: He has a normal mood and affect  His behavior is normal    Vitals reviewed  Additional Data:     Lab Results: I have personally reviewed pertinent reports  Results from last 7 days  Lab Units 07/31/18  1751   WBC Thousand/uL 8 95   HEMOGLOBIN g/dL 9 2*   HEMATOCRIT % 30 1*   PLATELETS Thousands/uL 171   NEUTROS PCT % 79*   LYMPHS PCT % 13*   MONOS PCT % 8   EOS PCT % 1       Results from last 7 days  Lab Units 07/31/18  1751   SODIUM mmol/L 141   POTASSIUM mmol/L 4 2   CHLORIDE mmol/L 106   CO2 mmol/L 26   BUN mg/dL 19   CREATININE mg/dL 0 87   CALCIUM mg/dL 8 2*   TOTAL PROTEIN g/dL 6 8   BILIRUBIN TOTAL mg/dL 0 86   ALK PHOS U/L 342*   ALT U/L 7*   AST U/L 48*   GLUCOSE RANDOM mg/dL 197*       Results from last 7 days  Lab Units 07/31/18  1751   INR  1 63*       Imaging: I have personally reviewed pertinent reports        X-ray Chest 2 Views    Result Date: 7/31/2018  Narrative: CHEST INDICATION:   Chest pain  COMPARISON:  1/6/2017 EXAM PERFORMED/VIEWS:  XR CHEST AP & LATERAL Images: 3 FINDINGS:  The patient is slightly rotated to the right  Cardiomediastinal silhouette appears stable noting uncoiled thoracic aorta  There is a faint nodular density seen over the anterior heart on lateral view which could correspond to middle lobe opacity on recent abdominal CT  Skinfold is seen along the right lower chest wall  No consolidation  Blunting of the posterior costophrenic angles suggesting trace pleural effusions  Osseous structures appear within normal limits for patient age  Impression: Blunting of the posterior costophrenic angles suggesting trace pleural effusions  Faint right middle lobe opacity suggested, nonspecific  Workstation performed: [id number]     Ct Head Without Contrast    Result Date: 7/31/2018  Narrative: CT BRAIN - WITHOUT CONTRAST INDICATION:   Patient fell  COMPARISON:  CT head 12/27/2016, MRI brain 10/29/2014 TECHNIQUE:  CT examination of the brain was performed  In addition to axial images, coronal 2D reformatted images were created and submitted for interpretation  Radiation dose length product (DLP) for this visit:  945 mGy-cm   This examination, like all CT scans performed in the [facility], was performed utilizing techniques to minimize radiation dose exposure, including the use of iterative reconstruction and automated exposure control  IMAGE QUALITY:  Diagnostic  FINDINGS: PARENCHYMA:  No intracranial mass, mass effect or midline shift  No CT signs of acute infarction  No acute parenchymal hemorrhage  Age-related cortical atrophy  Periventricular white matter hypodensity which is nonspecific although most compatible with  chronic small vessel ischemic disease  Prominent perivascular CSF spaces or old lacunar infarcts bilateral basal ganglia  Vascular calcifications   VENTRICLES AND EXTRA-AXIAL SPACES:  Unchanged  VISUALIZED ORBITS AND PARANASAL SINUSES:  Unremarkable  CALVARIUM AND EXTRACRANIAL SOFT TISSUES:  Normal      Impression: No acute intracranial abnormality  Workstation performed: [id number]     Ct Spine Cervical Without Contrast    Result Date: 7/31/2018  Narrative: CT CERVICAL SPINE - WITHOUT CONTRAST INDICATION:   Patient fell  COMPARISON:  CT cervical spine 9/9/2013 TECHNIQUE:  CT examination of the cervical spine was performed without intravenous contrast   Contiguous axial images were obtained  Sagittal and coronal reconstructions were performed  Radiation dose length product (DLP) for this visit:  258 mGy-cm   This examination, like all CT scans performed in the [facility], was performed utilizing techniques to minimize radiation dose exposure, including the use of iterative reconstruction and automated exposure control  IMAGE QUALITY:  Diagnostic  FINDINGS: ALIGNMENT:  Stable alignment  No traumatic subluxation detected  There are no jumped facets  VERTEBRAL BODIES:  No fracture  DEGENERATIVE CHANGES:  Moderately severe multilevel cervical spondylosis with disc space narrowing, osteophyte formation, hypertrophic uncovertebral joint changes and varying degrees of neural foraminal narrowing  No critical central canal stenosis  PREVERTEBRAL AND PARASPINAL SOFT TISSUES:  Carotid artery calcifications  THORACIC INLET:  No pneumothorax  Unchanged calcific scarring in the right apex  Impression: No cervical spine fracture or traumatic malalignment  Moderately severe multilevel cervical spondylosis  Workstation performed: [id number]     Ct Abdomen Pelvis With Contrast    Result Date: 7/31/2018  Narrative: CT ABDOMEN AND PELVIS WITH IV CONTRAST INDICATION:   Abdominal pain, unspecified  COMPARISON:  CT abdomen/pelvis dated March 6, 2014  TECHNIQUE:  CT examination of the abdomen and pelvis was performed   Axial, sagittal, and coronal 2D reformatted images were created from the source data and submitted for interpretation  Radiation dose length product (DLP) for this visit:  271 mGy-cm   This examination, like all CT scans performed in the [facility], was performed utilizing techniques to minimize radiation dose exposure, including the use of iterative reconstruction and automated exposure control  IV Contrast:  80 mL of iohexol (OMNIPAQUE) Enteric Contrast:  Enteric contrast was not administered  FINDINGS: ABDOMEN LOWER CHEST:  There are tiny bilateral pleural effusions with mild bibasilar atelectasis  There a few scattered faint subcentimeter nodules  A few peripheral groundglass opacities are also noted at the right lung base  LIVER/BILIARY TREE:  There is diffuse heterogeneity of the hepatic parenchyma with ill-defined mass at the anterior hepatic dome measuring 3 8 x 3 9 cm  There is no intrahepatic biliary ductal dilatation  GALLBLADDER:  No calcified gallstones  No pericholecystic inflammatory change  SPLEEN:  Unremarkable  PANCREAS:  There is a soft tissue mass at the mid pancreatic body measuring approximately 3 3 x 2 7 cm which appears to encase the proximal SMA and celiac arteries  There is pancreatic ductal dilatation and atrophy of the distal pancreas  Supple cysts ADRENAL GLANDS:  Unremarkable  KIDNEYS/URETERS:  One or more simple renal cyst(s) is noted  Otherwise unremarkable kidneys  No hydronephrosis  STOMACH AND BOWEL:  There is colonic diverticulosis without evidence of acute diverticulitis  APPENDIX:  A normal appendix was visualized  ABDOMINOPELVIC CAVITY:  There is a small amount of intra-abdominal and pelvic ascites  No free intraperitoneal air  VESSELS:  Unremarkable for patient's age  PELVIS REPRODUCTIVE ORGANS:  Unremarkable for patient's age  URINARY BLADDER:  There is a small diverticulum at the posterior right urinary bladder wall, stable  ABDOMINAL WALL/INGUINAL REGIONS:  Unremarkable   OSSEOUS STRUCTURES:  No acute fracture or destructive osseous lesion  Impression: 3 3 cm soft tissue mass at the mid pancreatic body which appears to encase the adjacent celiac and superior mesenteric arteries  There is atrophy and marked ductal dilatation of the distal pancreas  The findings are highly suspicious for malignancy  An enhanced MRI abdomen/MRCP is recommended for further evaluation  Heterogeneous enhancement of the liver with multiple ill-defined lesions measuring up to 3 9 cm suspicious for metastatic disease  This can be further evaluated at the time of the MRI abdomen  Small to moderate amount of intra-abdominal and pelvic ascites  No free intraperitoneal air  Scattered colonic diverticulosis with no inflammatory changes present to suggest acute diverticulitis  Scattered subcentimeter nodules noted at both lung bases suspicious for metastatic disease  A few peripheral ground glass opacities are also noted at the right lung base suspicious for an infectious or inflammatory process  Trace bilateral pleural effusions  A CT scan of the chest is recommended for further evaluation  I personally discussed this study with Shanti Holt on 7/31/2018 at 7:31 PM   Workstation performed: [id number]       EKG, Pathology, and Other Studies Reviewed on Admission:   · EKG: NSR    Allscripts / Epic Records Reviewed: Yes     ** Please Note: This note has been constructed using a voice recognition system   **

## 2018-08-01 NOTE — PLAN OF CARE
Knowledge Deficit     Patient/family/caregiver demonstrates understanding of disease process, treatment plan, medications, and discharge instructions Progressing        Nutrition/Hydration-ADULT     Nutrient/Hydration intake appropriate for improving, restoring or maintaining nutritional needs Progressing        PAIN - ADULT     Verbalizes/displays adequate comfort level or baseline comfort level Progressing        Potential for Falls     Patient will remain free of falls Progressing        Prexisting or High Potential for Compromised Skin Integrity     Skin integrity is maintained or improved Progressing

## 2018-08-01 NOTE — CONSULTS
Patient: Scarlett Brewster  Patient MRN: [id number]  Service date: 8/1/2018  Attending Physician:       CHIEF COMPLAIN  Chief Complaint   Patient presents with    Fall     Patient presents with daughter, reports patient was found on ground today following fall by home health aid  Pt was covered in stool, per daughter  Shares that he's had increased weakness, but denies any new mental status changes or confusion  Hx of parkinsons   Leg Swelling     Reports bilateral lower extremity edema to the point of "not being able to put his shoes on"  Heme / Oncology history:  Scarlett Brewster is a 80 y o  male     None     HISTORY OF PRESENT ILLNESS:  Scarlett Brewster is a 80 y o  male who has below mentioned medical condition, presented with syncope, further imaging showed pancreatic body mass and liver mass, overall favor malignancy  Oncology was consulted for comanagement  There is a language barrier  History was obtained from patient's daughter and from chart  Patient presented with a poor appetite, abdominal distention, 60 lb weight loss since last October  Also noted is pale, fatigue, worsening, until yesterday patient had a syncope event  Patient has no prior oncology history, nonsmoker, used to drink alcohol socially  Patient mother had pancreatic cancer      PROBLEM LIST:  Patient Active Problem List   Diagnosis    Dementia    Hypertension    Parkinson's disease ([facility] )    History of CVA (cerebrovascular accident)    Acute tracheobronchitis    Ambulatory dysfunction    Diabetes mellitus, insulin dependent (IDDM), uncontrolled (HCC)    Blister of left foot    Acute urinary retention    LUQ pain    Benign prostatic hyperplasia with nocturia    Sleep disorder    Skin cancer    Stroke syndrome (HCC)    Tremor    Staggering gait    Peripheral neuropathy    Orthostatic hypotension    Chronic midline low back pain    Dizziness and giddiness    Lower abdominal pain    Pancreatic cancer ([facility] ) ASSESSMENT/PLAN:  Abdoulaye Bauer is a 80 y o  male with:    1) pancreatic mass/liver mass  - highly suspicious malignancy  The pattern favors primary pancreatic cancer with liver metastatic lesion  However could be both metastatic cancer from unknown primary   - had a long conversation with patient's family  discussed with patient 's daughter Macie More [phone],  regarding options, including 1)  Hospice, 2) basic workup for diagnosis, 3)  progressive treatment  Made patient aware if metastatic,  this is not curable, patient not a candidate for aggressive chemotherapy  Patient's daughter will discuss with other family members  - in the meantime will proceed with tumor marker   If elevated will be highly suggestive for pancreatic primary  2) supportive care  - the patient appears comfortable    3) anemia  - hemoglobin 9, MCV 84, normocytic  Possible from chronic disease, malignancy  45   minutes were spent with patient and family with greater than 50% of the time spent in counseling or coordination of care including discussions of treatment instructions  All of the patient's questions were answered to their satisfactory during this discussion   Boogie Ratliff MD PhD  Hematology / Oncology                              PAST MEDICAL HISTORY:   has a past medical history of Dementia; Diabetes mellitus, type 2 ([facility] ); Hypertension; Iron deficiency; Parkinson's disease ([facility] ); Pneumonia; Stroke [facility]); and Vitamin D deficiency  PAST SURGICAL HISTORY:   has a past surgical history that includes Hallux valgus correction      CURRENT MEDICATIONS  Scheduled Meds:  Current Facility-Administered Medications:  [START ON 8/2/2018] Amantadine HCl 50 mg Oral Daily DESIREE Santiago-MIREYA   amLODIPine 10 mg Oral HS DESIREE Santiago-MIREYA   aspirin-dipyridamole 1 capsule Oral BID DESIREE Santiago-MIREYA   Carbidopa-Levodopa ER 1 capsule Oral 9 times per day Ainsley Hatchet, MD   citalopram 20 mg Oral Daily Kimberly Ballesteros [place], FANNIE   donepezil 5 mg Oral HS Sarah Roles, PA-C   enoxaparin 40 mg Subcutaneous Q24H [address] Erich Zhu MD   hydrALAZINE 5 mg Intravenous Q6H PRN Wiley Aldana, SANCHEZ   insulin glargine 10 Units Subcutaneous HS Sarah Roles, PA-C   insulin lispro 1-5 Units Subcutaneous TID AC Sarah Roles, PA-C   insulin lispro 1-5 Units Subcutaneous HS Sarah Roles, PA-C   lisinopril 10 mg Oral Daily Sarah Roles, PA-C   morphine injection 2 mg Intravenous Q4H PRN Sarah Roles, PA-C   OLANZapine 20 mg Oral HS Sarah Roles, PA-C   ondansetron 4 mg Intravenous Q6H PRN Sarah Roles, PA-C   pantoprazole 40 mg Oral Early Morning Sarah Roles, PA-C     Continuous Infusions:   PRN Meds: hydrALAZINE    morphine injection    ondansetron    SOCIAL HISTORY:   reports that he has never smoked  He has never used smokeless tobacco  He reports that he does not drink alcohol or use drugs  FAMILY HISTORY:  family history includes Depression in his family; Diabetes in his family; Heart attack in his family; Hypertension in his family; Pancreatic cancer in his family  ALLERGIES:  is allergic to mirapex [pramipexole]  REVIEW OF SYSTEMS:  Please note that a 14-point review of systems was performed to include Constitutional, HEENT, Respiratory, CVS, GI, , Musculoskeletal, Integumentary, Neurologic, Rheumatologic, Endocrinologic, Psychiatric, Lymphatic, and Hematologic/Oncologic systems were reviewed and are negative unless otherwise stated in HPI  Positive and negative findings pertinent to this evaluation are incorporated into the history of present illness  PHYSICAL EXAMINATION:  Vital Signs: Temp:  [94 6 °F (34 8 °C)-97 7 °F (36 5 °C)] 97 3 °F (36 3 °C)  HR:  [54-90] 72  Resp:  [18-20] 20  BP: (109-196)/(56-91) 130/62  Body mass index is 22 57 kg/m²  Body surface area is 1 36 meters squared  Constitutional: Alert and oriented  HEENT: Anicteric, PERRLA    Chest: Decreased breathing sound bilaterally, No wheezes/rales/rhonchi  CVS: Regular rhythm  Normal rate  Abdomen: Soft, nontender, nondistended  No palpable organomegaly  Extremities: No cyanosis/clubbing/edema  Integumentary: No obvious rashes or bruises  Musculoskeletal: No obvious bony or joint deformities  Psychiatric: Appropriate affect and mood  Lymph Node Survey: No palpable preauricular, submandibular, cervical, supraclavicular, axillary, epitrochlear or inguinal lymphadenopathy  LABS:    Results from last 7 days  Lab Units 08/01/18  0508 07/31/18  1751   WBC Thousand/uL 10 57* 8 95   HEMATOCRIT % 30 3* 30 1*   PLATELETS Thousands/uL 187 171   NEUTROS PCT %  --  79*   MONOS PCT %  --  8       Results from last 7 days  Lab Units 08/01/18  0508 07/31/18  1751   SODIUM mmol/L 141 141   POTASSIUM mmol/L 3 2* 4 2   CHLORIDE mmol/L 107 106   CO2 mmol/L 25 26   BUN mg/dL 18 19       Results from last 7 days  Lab Units 08/01/18  0508 07/31/18  1751   MAGNESIUM mg/dL 1 8  --    ALBUMIN g/dL 2 4* 2 5*   BILIRUBIN TOTAL mg/dL 0 95 0 86   ALK PHOS U/L 341* 342*   ALT U/L 9* 7*   AST U/L 32 48*       Results from last 7 days  Lab Units 07/31/18  1751   INR  1 63*   PTT seconds 37*           Invalid input(s): TNI,  PCT              IMAGING:  X-ray chest 2 views   Final Result      Blunting of the posterior costophrenic angles suggesting trace pleural effusions  Faint right middle lobe opacity suggested, nonspecific  Workstation performed: [id number]         CT abdomen pelvis with contrast   Final Result      3 3 cm soft tissue mass at the mid pancreatic body which appears to encase the adjacent celiac and superior mesenteric arteries  There is atrophy and marked ductal dilatation of the distal pancreas  The findings are highly suspicious for malignancy  An enhanced MRI abdomen/MRCP is recommended for further evaluation        Heterogeneous enhancement of the liver with multiple ill-defined lesions measuring up to 3 9 cm suspicious for metastatic disease  This can be further evaluated at the time of the MRI abdomen  Small to moderate amount of intra-abdominal and pelvic ascites  No free intraperitoneal air  Scattered colonic diverticulosis with no inflammatory changes present to suggest acute diverticulitis  Scattered subcentimeter nodules noted at both lung bases suspicious for metastatic disease  A few peripheral ground glass opacities are also noted at the right lung base suspicious for an infectious or inflammatory process  Trace bilateral pleural    effusions  A CT scan of the chest is recommended for further evaluation  I personally discussed this study with Blanca Began on 7/31/2018 at 7:31 PM                 Workstation performed: [id number]         CT spine cervical without contrast   Final Result      No cervical spine fracture or traumatic malalignment  Moderately severe multilevel cervical spondylosis  Workstation performed: [id number]         CT head without contrast   Final Result      No acute intracranial abnormality                    Workstation performed: [id number]

## 2018-08-01 NOTE — PLAN OF CARE
Problem: PHYSICAL THERAPY ADULT  Goal: Performs mobility at highest level of function for planned discharge setting  See evaluation for individualized goals  Treatment/Interventions: Functional transfer training, LE strengthening/ROM, Therapeutic exercise, Endurance training, Patient/family training, Equipment eval/education, Bed mobility, Gait training, Compensatory technique education, Continued evaluation, Spoke to nursing, OT  Equipment Recommended:  (monitor)       See flowsheet documentation for full assessment, interventions and recommendations  Prognosis: Guarded (given medical diagnosis )  Problem List: Decreased strength, Decreased range of motion, Decreased endurance, Impaired balance, Decreased mobility, Decreased coordination, Decreased cognition, Impaired judgement, Decreased safety awareness  Assessment: Pt is 81 y/o male admitted after being found down at home following a fall  Upon testing found to have newly diagnosed pancreatic CA and awaiting oncology evaluation  PT consulted with orders activity as tolerated  Pt seen at bedside  Syriac speaking only, poor historian and mumbling speech and unintelligible at times  Pt notes baseline ambulation with RW support short distances in home  Stating resides in one floor accomodations with spouse and does not perform stairs  Currently presents with decreased general strength, activity tolerance, balance, ROM limitations in b/l knees, and functional mobility  Impaired cognition as oriented to self only at this time  Given impairments will benefit from skilled PT in order to progress and optimize functional mobility while lessening caregiver support  May require rehab at this time given impairments, however will monitor medical course and decisions regarding POC for continued appropriateness for PT intervention  Ultimately requiring 24* assistance at this time given severe functional limitations    Will follow and progress as appropriate as well as monitor and assist with d/c planning  Barriers to Discharge Comments: medical prognosis? Recommendation: 24 hour supervision/assist (rhb vs 24* care pending medical decisions )          See flowsheet documentation for full assessment

## 2018-08-02 LAB
AMMONIA PLAS-SCNC: 21 UMOL/L (ref 11–35)
ANION GAP SERPL CALCULATED.3IONS-SCNC: 8 MMOL/L (ref 4–13)
BASOPHILS # BLD AUTO: 0.02 THOUSANDS/ΜL (ref 0–0.1)
BASOPHILS NFR BLD AUTO: 0 % (ref 0–1)
BUN SERPL-MCNC: 19 MG/DL (ref 5–25)
CALCIUM SERPL-MCNC: 8.2 MG/DL (ref 8.3–10.1)
CHLORIDE SERPL-SCNC: 108 MMOL/L (ref 100–108)
CO2 SERPL-SCNC: 27 MMOL/L (ref 21–32)
CREAT SERPL-MCNC: 1 MG/DL (ref 0.6–1.3)
EOSINOPHIL # BLD AUTO: 0.28 THOUSAND/ΜL (ref 0–0.61)
EOSINOPHIL NFR BLD AUTO: 3 % (ref 0–6)
ERYTHROCYTE [DISTWIDTH] IN BLOOD BY AUTOMATED COUNT: 14.3 % (ref 11.6–15.1)
GFR SERPL CREATININE-BSD FRML MDRD: 67 ML/MIN/1.73SQ M
GLUCOSE SERPL-MCNC: 104 MG/DL (ref 65–140)
GLUCOSE SERPL-MCNC: 154 MG/DL (ref 65–140)
GLUCOSE SERPL-MCNC: 198 MG/DL (ref 65–140)
GLUCOSE SERPL-MCNC: 85 MG/DL (ref 65–140)
GLUCOSE SERPL-MCNC: 90 MG/DL (ref 65–140)
HCT VFR BLD AUTO: 29.9 % (ref 36.5–49.3)
HGB BLD-MCNC: 9 G/DL (ref 12–17)
LYMPHOCYTES # BLD AUTO: 1.71 THOUSANDS/ΜL (ref 0.6–4.47)
LYMPHOCYTES NFR BLD AUTO: 19 % (ref 14–44)
MCH RBC QN AUTO: 25.4 PG (ref 26.8–34.3)
MCHC RBC AUTO-ENTMCNC: 30.1 G/DL (ref 31.4–37.4)
MCV RBC AUTO: 84 FL (ref 82–98)
MONOCYTES # BLD AUTO: 0.75 THOUSAND/ΜL (ref 0.17–1.22)
MONOCYTES NFR BLD AUTO: 8 % (ref 4–12)
NEUTROPHILS # BLD AUTO: 6.49 THOUSANDS/ΜL (ref 1.85–7.62)
NEUTS SEG NFR BLD AUTO: 70 % (ref 43–75)
NRBC BLD AUTO-RTO: 0 /100 WBCS
PLATELET # BLD AUTO: 174 THOUSANDS/UL (ref 149–390)
PMV BLD AUTO: 12.2 FL (ref 8.9–12.7)
POTASSIUM SERPL-SCNC: 3.2 MMOL/L (ref 3.5–5.3)
RBC # BLD AUTO: 3.55 MILLION/UL (ref 3.88–5.62)
SODIUM SERPL-SCNC: 143 MMOL/L (ref 136–145)
WBC # BLD AUTO: 9.25 THOUSAND/UL (ref 4.31–10.16)

## 2018-08-02 PROCEDURE — 82948 REAGENT STRIP/BLOOD GLUCOSE: CPT

## 2018-08-02 PROCEDURE — 99232 SBSQ HOSP IP/OBS MODERATE 35: CPT | Performed by: FAMILY MEDICINE

## 2018-08-02 PROCEDURE — 85025 COMPLETE CBC W/AUTO DIFF WBC: CPT | Performed by: INTERNAL MEDICINE

## 2018-08-02 PROCEDURE — 86301 IMMUNOASSAY TUMOR CA 19-9: CPT | Performed by: INTERNAL MEDICINE

## 2018-08-02 PROCEDURE — 82140 ASSAY OF AMMONIA: CPT | Performed by: INTERNAL MEDICINE

## 2018-08-02 PROCEDURE — 99232 SBSQ HOSP IP/OBS MODERATE 35: CPT | Performed by: INTERNAL MEDICINE

## 2018-08-02 PROCEDURE — 80048 BASIC METABOLIC PNL TOTAL CA: CPT | Performed by: INTERNAL MEDICINE

## 2018-08-02 RX ORDER — POTASSIUM CHLORIDE 20 MEQ/1
40 TABLET, EXTENDED RELEASE ORAL ONCE
Status: COMPLETED | OUTPATIENT
Start: 2018-08-02 | End: 2018-08-02

## 2018-08-02 RX ADMIN — INSULIN LISPRO 1 UNITS: 100 INJECTION, SOLUTION INTRAVENOUS; SUBCUTANEOUS at 21:40

## 2018-08-02 RX ADMIN — DONEPEZIL HYDROCHLORIDE 5 MG: 5 TABLET, FILM COATED ORAL at 21:35

## 2018-08-02 RX ADMIN — ASPIRIN AND EXTENDED-RELEASE DIPYRIDAMOLE 1 CAPSULE: 25; 200 CAPSULE ORAL at 17:15

## 2018-08-02 RX ADMIN — AMANTADINE HYDROCHLORIDE 50 MG: 100 TABLET ORAL at 09:47

## 2018-08-02 RX ADMIN — PANTOPRAZOLE SODIUM 40 MG: 40 TABLET, DELAYED RELEASE ORAL at 05:25

## 2018-08-02 RX ADMIN — POTASSIUM CHLORIDE 40 MEQ: 1500 TABLET, EXTENDED RELEASE ORAL at 09:44

## 2018-08-02 RX ADMIN — ASPIRIN AND EXTENDED-RELEASE DIPYRIDAMOLE 1 CAPSULE: 25; 200 CAPSULE ORAL at 09:45

## 2018-08-02 RX ADMIN — INSULIN GLARGINE 10 UNITS: 100 INJECTION, SOLUTION SUBCUTANEOUS at 21:42

## 2018-08-02 RX ADMIN — OLANZAPINE 20 MG: 10 TABLET, FILM COATED ORAL at 21:36

## 2018-08-02 RX ADMIN — LISINOPRIL 10 MG: 5 TABLET ORAL at 09:44

## 2018-08-02 RX ADMIN — AMLODIPINE BESYLATE 10 MG: 10 TABLET ORAL at 21:35

## 2018-08-02 RX ADMIN — CITALOPRAM HYDROBROMIDE 20 MG: 20 TABLET ORAL at 09:45

## 2018-08-02 RX ADMIN — ENOXAPARIN SODIUM 40 MG: 40 INJECTION SUBCUTANEOUS at 09:47

## 2018-08-02 RX ADMIN — INSULIN LISPRO 1 UNITS: 100 INJECTION, SOLUTION INTRAVENOUS; SUBCUTANEOUS at 17:16

## 2018-08-02 NOTE — PLAN OF CARE
DISCHARGE PLANNING - CARE MANAGEMENT     Discharge to post-acute care or home with appropriate resources Progressing        Knowledge Deficit     Patient/family/caregiver demonstrates understanding of disease process, treatment plan, medications, and discharge instructions Progressing        Nutrition/Hydration-ADULT     Nutrient/Hydration intake appropriate for improving, restoring or maintaining nutritional needs Progressing        PAIN - ADULT     Verbalizes/displays adequate comfort level or baseline comfort level Progressing        Potential for Falls     Patient will remain free of falls Progressing        Prexisting or High Potential for Compromised Skin Integrity     Skin integrity is maintained or improved Progressing

## 2018-08-02 NOTE — SOCIAL WORK
FRANCESCA called Phoebe Ledesma to obtain information for discharge planning but had to leave a VoiceMail message  FRANCESCA will attempt to call again this afternoon if no return call       Geo Tena RN

## 2018-08-02 NOTE — PROGRESS NOTES
[facility] Progress Note  Patient: Neela Byrnes 80 y o  male   MRN: [id number]  PCP: Kelsey Merchant MD  Unit/Bed#: E4 -01 Encounter: [id number]  Date Of Visit: 08/02/18    Assessment:    Principal Problem:    Pancreatic cancer [facility])  Active Problems:    Dementia    Hypertension    Parkinson's disease ([facility] )    History of CVA (cerebrovascular accident)    Diabetes mellitus, insulin dependent (IDDM), uncontrolled ([facility] )    Sleep disorder      Plan: This is an 80-year-old male with past medical history significant for hypertension, diabetes mellitus, Parkinson's, dementia, CVA admitted on 07/31/2018 for fall at home      1 )  Mechanical fall  -patient fell wall try to go to the bathroom via walker  -likely secondary to lethargy and recent weight loss  -no fractures, PT OT     2 )  Pancreatic mass  -imaging reveals 3 3 cm soft tissue mass at mid pancreatic body appears to be encasing adjacent celiac and superior mesenteric arteries, atrophy and marked ductal dilation of distal pancreas findings highly suspicious for malignancy  -family aware, family would not like to tele patient at this time  -had a detailed discussion with patient's daughter, Alessandra Phoenix, at bedside regarding diagnosis    -explained that given patient's age, weakness, unclear whether patient would tolerate further workup or treatment including biopsy and chemotherapy -oncology consult appreciated, pending CA 21 8  -discussed with daughter today who states she would not want to pursue biopsy and deciding on home versus inpatient hospice at this point  -palliative care input appreciated     3 )  Hypertension  -continue with Norvasc and monitor blood pressure     4 )  Parkinson's dementia  -continue with Aricept and Sinemet     5 )  History of CVA  -continue Aggrenox     6 )  Diabetes mellitus  -continue home dose of Lantus, monitor Accu-Cheks, sliding scale for coverage       VTE Pharmacologic Prophylaxis:   Pharmacologic: lovenox    Patient Centered Rounds: I have performed bedside rounds with nursing staff today  Discussions with Specialists or Other Care Team Provider:  Palliative care    Education and Discussions with Family / Patient:  DaughterTamia    Time Spent for Care: 20 minutes  More than 50% of total time spent on counseling and coordination of care as described above  Current Length of Stay: 2 day(s)    Current Patient Status: Inpatient   Certification Statement: The patient will continue to require additional inpatient hospital stay due to pancreatic ca    Discharge Plan / Estimated Discharge Date: 24-48 hours    Code Status: Level 3 - DNAR and DNI      Subjective:   Patient seen and examined at bedside, currently denies any complaints  Objective:     Vitals:   Temp (24hrs), Av 7 °F (35 9 °C), Min:96 3 °F (35 7 °C), Max:97 3 °F (36 3 °C)    HR:  [57-86] 86  Resp:  [18-20] 18  BP: (115-142)/(62-82) 142/74  SpO2:  [92 %-98 %] 98 %  Body mass index is 22 57 kg/m²  Input and Output Summary (last 24 hours): Intake/Output Summary (Last 24 hours) at 18 1636  Last data filed at 18 0401   Gross per 24 hour   Intake                0 ml   Output              120 ml   Net             -120 ml       Physical Exam:    Constitutional: Patient is not in acute distress  HEENT:  Normocephalic, atraumatic, EOMI, PERRLA, no scleral icterus, no pallor, moist oral mucosa  Neck:  Supple, no masses, no thyromegaly, no bruits Normal range of motion  Lymph nodes:  No lymphadenopathy  Cardiovascular: Normal S1S2, RRR, No murmurs/rubs/gallops appreciated  Pulmonary: Clear to auscultation bilaterally, No rhonchi/rales/wheezing appreciated  Abdominal: Soft, Bowel sounds present, Non-tender, Non-distended, No rebound/guarding, no hepatomegaly   Musculoskeletal: No tenderness/abnormality   Extremities:  No cyanosis, clubbing or edema   Peripheral pulses palpable and equal bilaterally  Neurological: Cranial nerves II-XII grossly intact, sensation intact, otherwise no focal neurological symptoms  Skin: Skin is warm and dry, no rashes  Additional Data:     Labs:      Results from last 7 days  Lab Units 08/02/18  0422   WBC Thousand/uL 9 25   HEMOGLOBIN g/dL 9 0*   HEMATOCRIT % 29 9*   PLATELETS Thousands/uL 174   NEUTROS PCT % 70   LYMPHS PCT % 19   MONOS PCT % 8   EOS PCT % 3       Results from last 7 days  Lab Units 08/02/18  0422 08/01/18  0508   SODIUM mmol/L 143 141   POTASSIUM mmol/L 3 2* 3 2*   CHLORIDE mmol/L 108 107   CO2 mmol/L 27 25   BUN mg/dL 19 18   CREATININE mg/dL 1 00 0 81   CALCIUM mg/dL 8 2* 8 2*   TOTAL PROTEIN g/dL  --  6 4   BILIRUBIN TOTAL mg/dL  --  0 95   ALK PHOS U/L  --  341*   ALT U/L  --  9*   AST U/L  --  32   GLUCOSE RANDOM mg/dL 90 180*       Results from last 7 days  Lab Units 07/31/18  1751   INR  1 63*        I Have Reviewed All Lab Data Listed Above  Recent Cultures (last 7 days):       Results from last 7 days  Lab Units 07/31/18  1849 07/31/18  1751   BLOOD CULTURE  No Growth at 24 hrs  No Growth at 24 hrs         Last 24 Hours Medication List:     Current Facility-Administered Medications:  Amantadine HCl 50 mg Oral Daily Telma Luna, PA-C   amLODIPine 10 mg Oral HS Telma Luna, PA-C   aspirin-dipyridamole 1 capsule Oral BID Telma Luna, PA-C   Carbidopa-Levodopa ER 1 capsule Oral 9 times per day Logan Burnett MD   citalopram 20 mg Oral Daily Telma Luna, PA-C   donepezil 5 mg Oral HS Locaty Luna, PA-C   enoxaparin 40 mg Subcutaneous Q24H Erica Lewis MD   hydrALAZINE 5 mg Intravenous Q6H PRN SANCHEZ Reeves   insulin glargine 10 Units Subcutaneous HS Loanthonyie Sondrass, PA-C   insulin lispro 1-5 Units Subcutaneous TID AC Telma Luna, PA-C   insulin lispro 1-5 Units Subcutaneous HS Telma Luna, PA-C   lisinopril 10 mg Oral Daily Telma Luna, PA-C   morphine injection 2 mg Intravenous Q4H PRN Telma Luna PA-C   OLANZapine 20 mg Oral HS Wm Bob PA-C   ondansetron 4 mg Intravenous Q6H PRN Wm Bob PA-C   pantoprazole 40 mg Oral Early Morning Wm Bob PA-C        Today, Patient Was Seen By: Keara Muse MD

## 2018-08-02 NOTE — PLAN OF CARE
Problem: DISCHARGE PLANNING - CARE MANAGEMENT  Goal: Discharge to post-acute care or home with appropriate resources  INTERVENTIONS:  - Conduct assessment to determine patient/family and health care team treatment goals, and need for post-acute services based on payer coverage, community resources, and patient preferences, and barriers to discharge  - Address psychosocial, clinical, and financial barriers to discharge as identified in assessment in conjunction with the patient/family and health care team  - Arrange appropriate level of post-acute services according to patients   needs and preference and payer coverage in collaboration with the physician and health care team  - Communicate with and update the patient/family, physician, and health care team regarding progress on the discharge plan  - Arrange appropriate transportation to post-acute venues  Outcome: Progressing    Plan: to be determined  Palliative Care consult pending and Hospice liason to eval patient  Family still deciding if they will take patient home  Currently they refuse to have patient placed in a SNF

## 2018-08-02 NOTE — SOCIAL WORK
CM received return call from daughter Jackie Speaker [phone] to discuss discharge needs  CM explained role and made sure name was on whiteboard  Patient lives in [place] [place] with his wife in a 2 bedroom,1st floor apartment with no steps to enter  Patient's wife had a TBI as a child and per daughter, "is not competent" and can not provide assistance to   Daughter and Son live near by and helps patient with ADLS and food shopping and transportation to [address] [place] [address]  Patient has RW, W/C, SC, Hospital Bed at home  Prior to admission patient was independent with miminal ADL's, he is able to feed self and brush teeth and is able to toilet self  Patient receives help/support from family and has a HHA from [facility] 7 days a week 4 hrs a day, 2 hrs in morning and 2 hours at night  Jackie Speaker state's she  will provide transportation home at discharge  Patient has hx of using [facility] and would be open to using them if needed at d/c  Patient uses [facility] on [address]  Patient's PCP is Dr Lexi Thomas  Patient has not appointed a  POA and does not have a Living Will but daughter did inquire about how she would become his POA  CM provided her with the number for an Elder Law  since patient has POA for wife and she had more detailed questions that CM could not answer  Jackie Alarcon states she's not sure where she wants her father to go at time of discharge  CM explained PT recommends if patient goes home he will need 24 hr supervision, she states she will get back to me if that is possible for him  CM explined if he can't be cared for at home that going to a SNF would be an option, Jackie Alarcon adamantly refused to place patient in a SNF  Palliative Care consult pending  CM will wait for determination on next level of care from patient's medical team      Jackie Alarcon states she will be at the hospital today around 1pm and should be here for the rest of the day         CM will continue to follow for any further anticipated d/c needs      Angelica Alejandro  [phone]

## 2018-08-02 NOTE — PHYSICAL THERAPY NOTE
PHYSICAL THERAPY NOTE          Patient Name: Maci SIERRA Date: 8/2/2018     Hospice consult pending  Will follow as appropriate  Nsg notified

## 2018-08-02 NOTE — TREATMENT PLAN
Reviewed chart, and interviewed pt at bedside [place] [facility] [place]  He appears poorly oriented to situation, and very dysarthric  There is a tremor of the neck and mouth, as well as a stutter, and word-finding difficulties  He only endorses some RLQ pain, which is not terribly bothersome to him  Per discussion with CM/RN VIDYA Anton and attending MD NICK Duff, pt is incompetent, and family continues to wrestle with concerns for diagnostics and next steps in plan  At the moment, Dr Ebenezer Duff wishes to work with family and with Med/Onc provider Dr Jeannine Rodriguez as re: indications and utility of bx, but we are still awaiting tumor markers to guide us  Derek Villavicencio, pt's primary advocate and daughter, will be in hospital at 13:30 today  Unfortunately for me, I am unavailable d/t clinic conflicts in my scheduled at [facility]  I will attempt to send a hospice partner to provide some degree of support for a comfort cares plan, should the family wish to move in that direction  They are clear that they do not wish for SNF care, and our PT/OT teams make it clear that any D/C plan without 24hr supervision would be unsafe  We will continue to follow as we are able      Josh Traore MD  Palliative and Supportive Care  Pager: [phone]

## 2018-08-02 NOTE — SOCIAL WORK
CM received consult to place referral for Hospice  Referral sent to [facility]  Will wait for Liaison to eval patient       David Ortega RN

## 2018-08-03 LAB
CANCER AG19-9 SERPL-ACNC: 1 U/ML (ref 0–35)
GLUCOSE SERPL-MCNC: 115 MG/DL (ref 65–140)
GLUCOSE SERPL-MCNC: 170 MG/DL (ref 65–140)
GLUCOSE SERPL-MCNC: 237 MG/DL (ref 65–140)
GLUCOSE SERPL-MCNC: 26 MG/DL (ref 65–140)
GLUCOSE SERPL-MCNC: 304 MG/DL (ref 65–140)
GLUCOSE SERPL-MCNC: 44 MG/DL (ref 65–140)
GLUCOSE SERPL-MCNC: 80 MG/DL (ref 65–140)
GLUCOSE SERPL-MCNC: 89 MG/DL (ref 65–140)

## 2018-08-03 PROCEDURE — 99232 SBSQ HOSP IP/OBS MODERATE 35: CPT | Performed by: INTERNAL MEDICINE

## 2018-08-03 PROCEDURE — 82948 REAGENT STRIP/BLOOD GLUCOSE: CPT

## 2018-08-03 RX ORDER — OXYCODONE HYDROCHLORIDE 5 MG/1
2.5 TABLET ORAL EVERY 4 HOURS PRN
Status: DISCONTINUED | OUTPATIENT
Start: 2018-08-03 | End: 2018-08-03

## 2018-08-03 RX ORDER — OXYCODONE HCL 5 MG/5 ML
2.5 SOLUTION, ORAL ORAL EVERY 4 HOURS PRN
Status: DISCONTINUED | OUTPATIENT
Start: 2018-08-03 | End: 2018-08-06 | Stop reason: HOSPADM

## 2018-08-03 RX ORDER — INSULIN GLARGINE 100 [IU]/ML
8 INJECTION, SOLUTION SUBCUTANEOUS
Status: DISCONTINUED | OUTPATIENT
Start: 2018-08-03 | End: 2018-08-03

## 2018-08-03 RX ORDER — OXYCODONE HYDROCHLORIDE 5 MG/1
2.5 TABLET ORAL 2 TIMES DAILY
Status: DISCONTINUED | OUTPATIENT
Start: 2018-08-03 | End: 2018-08-06 | Stop reason: HOSPADM

## 2018-08-03 RX ORDER — MORPHINE SULFATE 2 MG/ML
1 INJECTION, SOLUTION INTRAMUSCULAR; INTRAVENOUS EVERY 4 HOURS PRN
Status: DISCONTINUED | OUTPATIENT
Start: 2018-08-03 | End: 2018-08-03

## 2018-08-03 RX ORDER — DEXTROSE MONOHYDRATE 25 G/50ML
INJECTION, SOLUTION INTRAVENOUS
Status: COMPLETED
Start: 2018-08-03 | End: 2018-08-03

## 2018-08-03 RX ADMIN — ENOXAPARIN SODIUM 40 MG: 40 INJECTION SUBCUTANEOUS at 09:16

## 2018-08-03 RX ADMIN — MORPHINE SULFATE 2 MG: 2 INJECTION, SOLUTION INTRAMUSCULAR; INTRAVENOUS at 11:44

## 2018-08-03 RX ADMIN — LISINOPRIL 10 MG: 5 TABLET ORAL at 09:16

## 2018-08-03 RX ADMIN — DONEPEZIL HYDROCHLORIDE 5 MG: 5 TABLET, FILM COATED ORAL at 21:02

## 2018-08-03 RX ADMIN — AMANTADINE HYDROCHLORIDE 50 MG: 100 TABLET ORAL at 09:18

## 2018-08-03 RX ADMIN — OLANZAPINE 20 MG: 10 TABLET, FILM COATED ORAL at 21:02

## 2018-08-03 RX ADMIN — DEXTROSE MONOHYDRATE 50 ML: 25 INJECTION, SOLUTION INTRAVENOUS at 14:39

## 2018-08-03 RX ADMIN — ASPIRIN AND EXTENDED-RELEASE DIPYRIDAMOLE 1 CAPSULE: 25; 200 CAPSULE ORAL at 09:16

## 2018-08-03 RX ADMIN — PANTOPRAZOLE SODIUM 40 MG: 40 TABLET, DELAYED RELEASE ORAL at 06:03

## 2018-08-03 RX ADMIN — ASPIRIN AND EXTENDED-RELEASE DIPYRIDAMOLE 1 CAPSULE: 25; 200 CAPSULE ORAL at 19:28

## 2018-08-03 RX ADMIN — INSULIN LISPRO 3 UNITS: 100 INJECTION, SOLUTION INTRAVENOUS; SUBCUTANEOUS at 11:45

## 2018-08-03 RX ADMIN — AMLODIPINE BESYLATE 10 MG: 10 TABLET ORAL at 21:02

## 2018-08-03 RX ADMIN — CITALOPRAM HYDROBROMIDE 20 MG: 20 TABLET ORAL at 09:16

## 2018-08-03 NOTE — PROGRESS NOTES
[facility] Internal Medicine Progress Note  Patient: Abdoulaye Bauer 80 y o  male   MRN: [id number]  PCP: Sushma Perdomo MD  Unit/Bed#: E4 -01 Encounter: [id number]  Date Of Visit: 08/03/18    Assessment:    Principal Problem:    Pancreatic cancer [facility])  Active Problems:    Dementia    Hypertension    Parkinson's disease ([facility] )    History of CVA (cerebrovascular accident)    Diabetes mellitus, insulin dependent (IDDM), uncontrolled ([facility] )    Sleep disorder      Plan:       This is an 49-year-old male with past medical history significant for hypertension, diabetes mellitus, Parkinson's, dementia, CVA admitted on 07/31/2018 for fall at home      1 )  Mechanical fall  -patient fell wall try to go to the bathroom via walker  -likely secondary to lethargy and recent weight loss  -no fractures     2 )  Pancreatic mass  -imaging reveals 3 3 cm soft tissue mass at mid pancreatic body appears to be encasing adjacent celiac and superior mesenteric arteries, atrophy and marked ductal dilation of distal pancreas findings highly suspicious for malignancy  -family aware, family would not like to tele patient at this time  -had a detailed discussion with patient's daughter, Macie More, at bedside regarding diagnosis    -explained that given patient's age, weakness, unclear whether patient would tolerate further workup or treatment including biopsy and chemotherapy -oncology consult appreciated  -discussed with daughter today who states that her and her brother have made the decision for home hospice  -palliative care input appreciated     3 )  Hypertension  -continue with Norvasc and monitor blood pressure     4 )  Parkinson's dementia  -continue with Aricept and Sinemet     5 )  History of CVA  -continue Aggrenox     6 )  Diabetes mellitus  -patient having episodes of hypoglycemia therefore will discontinue insulin  -monitor accuchecks, sliding scale for coverage       VTE Pharmacologic Prophylaxis:   Pharmacologic: lovenox    Patient Centered Rounds: I have performed bedside rounds with nursing staff today  Time Spent for Care: 20 minutes  More than 50% of total time spent on counseling and coordination of care as described above  Current Length of Stay: 3 day(s)    Current Patient Status: Inpatient   Certification Statement: The patient will continue to require additional inpatient hospital stay due to hypoglycemia, weakness    Discharge Plan / Estimated Discharge Date: 24-48 hours    Code Status: Level 3 - DNAR and DNI      Subjective:   Patient seen and examined at bedside, resting in bed, daughter, Precilla Speaker at bedside  Complaining of generalized abdominal pain  Objective:     Vitals:   Temp (24hrs), Av 4 °F (36 3 °C), Min:96 6 °F (35 9 °C), Max:97 9 °F (36 6 °C)    HR:  [53-69] 53  Resp:  [18] 18  BP: (111-139)/(55-69) 127/59  SpO2:  [96 %-98 %] 96 %  Body mass index is 22 57 kg/m²  Input and Output Summary (last 24 hours):     No intake or output data in the 24 hours ending 18 8396    Physical Exam:    Constitutional: Patient is in no acute distress  HEENT:  Normocephalic, atraumatic, EOMI, PERRLA, no scleral icterus, no pallor, moist oral mucosa  Neck:  Supple, no masses, no thyromegaly, no bruits Normal range of motion  Lymph nodes:  No lymphadenopathy  Cardiovascular: Normal S1S2, RRR, No murmurs/rubs/gallops appreciated  Pulmonary: Clear to auscultation bilaterally, No rhonchi/rales/wheezing appreciated  Abdominal: Soft, Bowel sounds present, Non-tender, Non-distended, No rebound/guarding, no hepatomegaly   Musculoskeletal: No tenderness/abnormality   Extremities:  No cyanosis, clubbing or edema  Peripheral pulses palpable and equal bilaterally  Neurological: Cranial nerves II-XII grossly intact, sensation intact, otherwise no focal neurological symptoms  Skin: Skin is warm and dry, no rashes      Additional Data:     Labs:      Results from last 7 days  Lab Units 18  0422   WBC Thousand/uL 9 25   HEMOGLOBIN g/dL 9 0*   HEMATOCRIT % 29 9*   PLATELETS Thousands/uL 174   NEUTROS PCT % 70   LYMPHS PCT % 19   MONOS PCT % 8   EOS PCT % 3       Results from last 7 days  Lab Units 08/02/18  0422 08/01/18  0508   SODIUM mmol/L 143 141   POTASSIUM mmol/L 3 2* 3 2*   CHLORIDE mmol/L 108 107   CO2 mmol/L 27 25   BUN mg/dL 19 18   CREATININE mg/dL 1 00 0 81   CALCIUM mg/dL 8 2* 8 2*   TOTAL PROTEIN g/dL  --  6 4   BILIRUBIN TOTAL mg/dL  --  0 95   ALK PHOS U/L  --  341*   ALT U/L  --  9*   AST U/L  --  32   GLUCOSE RANDOM mg/dL 90 180*       Results from last 7 days  Lab Units 07/31/18  1751   INR  1 63*        I Have Reviewed All Lab Data Listed Above  Recent Cultures (last 7 days):       Results from last 7 days  Lab Units 07/31/18  1849 07/31/18  1751   BLOOD CULTURE  No Growth at 48 hrs  No Growth at 48 hrs         Last 24 Hours Medication List:     Current Facility-Administered Medications:  Amantadine HCl 50 mg Oral Daily Arthchandler Bob PA-C   amLODIPine 10 mg Oral HS Arthea Lisbeth, FANNIE   aspirin-dipyridamole 1 capsule Oral BID Arthchandler Bob PA-C   Carbidopa-Levodopa ER 1 capsule Oral 9 times per day Keara Muse MD   citalopram 20 mg Oral Daily Arthea FANNIE Bob   donepezil 5 mg Oral HS Arthea LisbethFANNIE sol   enoxaparin 40 mg Subcutaneous Q24H [facility] Keara Muse MD   hydrALAZINE 5 mg Intravenous Q6H PRN SANCHEZ Bella   insulin lispro 1-5 Units Subcutaneous TID AC Arthea DESIREE Bob-C   insulin lispro 1-5 Units Subcutaneous HS Arthea FANNIE Bob   lisinopril 10 mg Oral Daily Arthea FANNIE Bob   OLANZapine 20 mg Oral HS Arthea LisbethFANNIE sol   ondansetron 4 mg Intravenous Q6H PRN Wm Bob PA-C   oxyCODONE 2 5 mg Oral BID Ronald [place]   oxyCODONE 2 5 mg Oral Q4H PRN Keara Muse MD   pantoprazole 40 mg Oral Early Morning Wm Bob PA-C        Today, Patient Was Seen By: Keara Muse MD

## 2018-08-03 NOTE — HOSPICE NOTE
Met with patients daughter Kristina Miles who is understanding of patients disease and discussed hospice service  At present she appear a bit overwhelmed as there has been a small amount of time to process all of the information discovered with this hospitalization  She has requested the weekend liaison try to meet with her and her brother to discuss services and help them through this process  Will pass on to weekend liaison to try to have her meet with Kristinagerry Miles and Josiane

## 2018-08-03 NOTE — SOCIAL WORK
CM met with daughter Derek Villavicencio to inquire about transport at discharge  She states she will be able to provide transport home       Caroline Sainz RN

## 2018-08-04 LAB
GLUCOSE SERPL-MCNC: 174 MG/DL (ref 65–140)
GLUCOSE SERPL-MCNC: 184 MG/DL (ref 65–140)
GLUCOSE SERPL-MCNC: 281 MG/DL (ref 65–140)
GLUCOSE SERPL-MCNC: 290 MG/DL (ref 65–140)

## 2018-08-04 PROCEDURE — 99232 SBSQ HOSP IP/OBS MODERATE 35: CPT | Performed by: INTERNAL MEDICINE

## 2018-08-04 PROCEDURE — 82948 REAGENT STRIP/BLOOD GLUCOSE: CPT

## 2018-08-04 RX ADMIN — ASPIRIN AND EXTENDED-RELEASE DIPYRIDAMOLE 1 CAPSULE: 25; 200 CAPSULE ORAL at 09:12

## 2018-08-04 RX ADMIN — DONEPEZIL HYDROCHLORIDE 5 MG: 5 TABLET, FILM COATED ORAL at 21:47

## 2018-08-04 RX ADMIN — OXYCODONE HYDROCHLORIDE 2.5 MG: 5 TABLET ORAL at 17:15

## 2018-08-04 RX ADMIN — OLANZAPINE 20 MG: 10 TABLET, FILM COATED ORAL at 21:46

## 2018-08-04 RX ADMIN — AMLODIPINE BESYLATE 10 MG: 10 TABLET ORAL at 21:47

## 2018-08-04 RX ADMIN — LISINOPRIL 10 MG: 5 TABLET ORAL at 09:12

## 2018-08-04 RX ADMIN — ENOXAPARIN SODIUM 40 MG: 40 INJECTION SUBCUTANEOUS at 09:12

## 2018-08-04 RX ADMIN — OXYCODONE HYDROCHLORIDE 2.5 MG: 5 TABLET ORAL at 09:12

## 2018-08-04 RX ADMIN — INSULIN LISPRO 2 UNITS: 100 INJECTION, SOLUTION INTRAVENOUS; SUBCUTANEOUS at 17:11

## 2018-08-04 RX ADMIN — PANTOPRAZOLE SODIUM 40 MG: 40 TABLET, DELAYED RELEASE ORAL at 06:10

## 2018-08-04 RX ADMIN — AMANTADINE HYDROCHLORIDE 50 MG: 100 TABLET ORAL at 09:14

## 2018-08-04 RX ADMIN — ASPIRIN AND EXTENDED-RELEASE DIPYRIDAMOLE 1 CAPSULE: 25; 200 CAPSULE ORAL at 17:12

## 2018-08-04 RX ADMIN — CITALOPRAM HYDROBROMIDE 20 MG: 20 TABLET ORAL at 09:12

## 2018-08-04 RX ADMIN — OXYCODONE HYDROCHLORIDE 2.5 MG: 5 SOLUTION ORAL at 20:28

## 2018-08-04 NOTE — SOCIAL WORK
Checked in with patient's daughter at bedside---plan is still proceed with home hospice, however she is hoping for d/c on Monday; reason to the above not provided  Previous CM notes and hospice liaison notes reviewed----unclear if arrangements are fully in place for hospice services; PC to Aleida Walters from [address] [place] [address]---vmm left  CM to follow  GUSTAVO Santiago  08/04/18  3:59 PM      Spoke with Aleida Walters from [facility] approved for hospice, consents not yet signed  Consents to be signed at home once hospice RN initiates visit  Hospice to be notified of d/c to confirm hospice SOC  CM to follow         GUSTAVO Santiago  08/04/18   4:33 PM

## 2018-08-04 NOTE — PROGRESS NOTES
[facility] Internal Medicine Progress Note  Patient: Chad Krause 80 y o  male   MRN: [id number]  PCP: Michael Celaya MD  Unit/Bed#: E4 -01 Encounter: [id number]  Date Of Visit: 08/04/18    Assessment:    Principal Problem:    Pancreatic cancer [facility])  Active Problems:    Dementia    Hypertension    [facility] ([facility] )    History of CVA (cerebrovascular accident)    Diabetes mellitus, insulin dependent (IDDM), uncontrolled ([facility] )    Sleep disorder      Plan: This is an 66-year-old male with past medical history significant for hypertension, diabetes mellitus, Parkinson's, dementia, CVA admitted on 07/31/2018 for fall at home      1 )  Mechanical fall  -patient fell wall try to go to the bathroom via walker  -likely secondary to lethargy and recent weight loss  -no fractures     2 )  Pancreatic mass  -imaging reveals 3 3 cm soft tissue mass at mid pancreatic body appears to be encasing adjacent celiac and superior mesenteric arteries, atrophy and marked ductal dilation of distal pancreas findings highly suspicious for malignancy  -family aware, family would not like to tele patient at this time  -had a detailed discussion with patient's daughter, Frankie Ye, at bedside regarding diagnosis    -explained that given patient's age, weakness, unclear whether patient would tolerate further workup or treatment including biopsy and chemotherapy -oncology consult appreciated  -discussed with daughter today who states that her and her brother have made the decision for home hospice  -palliative care input appreciated     3 )  Hypertension  -continue with Norvasc and monitor blood pressure     4 )  Parkinson's dementia  -continue with Aricept and Sinemet     5 )  History of CVA  -continue Aggrenox     6 )  Diabetes mellitus  -patient having episodes of hypoglycemia therefore will discontinue insulin  -monitor accuchecks, sliding scale for coverage    7 )  Suprapubic pain  -bladder scan revealed about 250 cc  -will monitor urine output    8 )  Stage II sacral decubitus  -wound evaluation    VTE Pharmacologic Prophylaxis:   Pharmacologic: lovenox    Patient Centered Rounds: I have performed bedside rounds with nursing staff today  Education and Discussions with Family / Patient: daughterJorge at bedside    Time Spent for Care: 20 minutes  More than 50% of total time spent on counseling and coordination of care as described above  Current Length of Stay: 4 day(s)    Current Patient Status: Inpatient   Certification Statement: The patient will continue to require additional inpatient hospital stay due to hospice placement    Discharge Plan / Estimated Discharge Date: 2-3 days    Code Status: Level 3 - DNAR and DNI      Subjective:   Patient seen and examined at bedside, complaining of generalized abdominal pain  Denies any nausea, vomiting, diarrhea, fever, chills  Objective:     Vitals:   Temp (24hrs), Av 4 °F (36 3 °C), Min:97 °F (36 1 °C), Max:97 9 °F (36 6 °C)    HR:  [53-77] 65  Resp:  [18] 18  BP: (120-133)/(52-84) 133/52  SpO2:  [94 %-97 %] 94 %  Body mass index is 22 57 kg/m²  Input and Output Summary (last 24 hours): Intake/Output Summary (Last 24 hours) at 18 1355  Last data filed at 18 1301   Gross per 24 hour   Intake              480 ml   Output              300 ml   Net              180 ml       Physical Exam:    Constitutional: Patient is in no acute distress  HEENT:  Normocephalic, atraumatic, EOMI, PERRLA, no scleral icterus, no pallor, moist oral mucosa  Neck:  Supple, no masses, no thyromegaly, no bruits Normal range of motion  Lymph nodes:  No lymphadenopathy  Cardiovascular: Normal S1S2, RRR, No murmurs/rubs/gallops appreciated    Pulmonary: Clear to auscultation bilaterally, No rhonchi/rales/wheezing appreciated  Abdominal: Soft, bowel sounds present, abdominal tenderness over gastric and suprapubic region  Musculoskeletal: No tenderness/abnormality   Extremities:  No cyanosis, clubbing or edema  Peripheral pulses palpable and equal bilaterally  Neurological: Cranial nerves II-XII grossly intact, sensation intact, otherwise no focal neurological symptoms  Skin: Skin is warm and dry, no rashes  Additional Data:     Labs:      Results from last 7 days  Lab Units 08/02/18  0422   WBC Thousand/uL 9 25   HEMOGLOBIN g/dL 9 0*   HEMATOCRIT % 29 9*   PLATELETS Thousands/uL 174   NEUTROS PCT % 70   LYMPHS PCT % 19   MONOS PCT % 8   EOS PCT % 3       Results from last 7 days  Lab Units 08/02/18  0422 08/01/18  0508   SODIUM mmol/L 143 141   POTASSIUM mmol/L 3 2* 3 2*   CHLORIDE mmol/L 108 107   CO2 mmol/L 27 25   BUN mg/dL 19 18   CREATININE mg/dL 1 00 0 81   CALCIUM mg/dL 8 2* 8 2*   TOTAL PROTEIN g/dL  --  6 4   BILIRUBIN TOTAL mg/dL  --  0 95   ALK PHOS U/L  --  341*   ALT U/L  --  9*   AST U/L  --  32   GLUCOSE RANDOM mg/dL 90 180*       Results from last 7 days  Lab Units 07/31/18  1751   INR  1 63*        I Have Reviewed All Lab Data Listed Above  Recent Cultures (last 7 days):       Results from last 7 days  Lab Units 07/31/18  1849 07/31/18  1751   BLOOD CULTURE  No Growth at 72 hrs  No Growth at 72 hrs         Last 24 Hours Medication List:     Current Facility-Administered Medications:  Amantadine HCl 50 mg Oral Daily Britney Ferrera PA-C   amLODIPine 10 mg Oral HS Britney Ferrera PA-C   aspirin-dipyridamole 1 capsule Oral BID Britney Ferrera PA-C   Carbidopa-Levodopa ER 1 capsule Oral 9 times per day Vishnu Cortes MD   citalopram 20 mg Oral Daily Britney Ferrera PA-C   donepezil 5 mg Oral HS Britney Ferrera PA-C   enoxaparin 40 mg Subcutaneous Q24H Jessi Manzo MD   hydrALAZINE 5 mg Intravenous Q6H PRN SANCHEZ Mullen   insulin lispro 1-5 Units Subcutaneous TID AC DESIREE Esquivel-MIREYA   insulin lispro 1-5 Units Subcutaneous HS Britney Ferrera PA-C   lisinopril 10 mg Oral Daily Britney Ferrera PA-C   OLANZapine 20 mg Oral HS Britney Ferrera PA-C ondansetron 4 mg Intravenous Q6H PRN Porfirio Julian PA-C   oxyCODONE 2 5 mg Oral BID Hussain Scanlon   oxyCODONE 2 5 mg Oral Q4H PRN Allie Ambrocio MD   pantoprazole 40 mg Oral Early Morning Porfirio Julian PA-C        Today, Patient Was Seen By: Allie Ambrocio MD

## 2018-08-05 LAB
BACTERIA BLD CULT: NORMAL
BACTERIA BLD CULT: NORMAL
GLUCOSE SERPL-MCNC: 147 MG/DL (ref 65–140)
GLUCOSE SERPL-MCNC: 228 MG/DL (ref 65–140)
GLUCOSE SERPL-MCNC: 257 MG/DL (ref 65–140)
GLUCOSE SERPL-MCNC: 267 MG/DL (ref 65–140)
GLUCOSE SERPL-MCNC: 268 MG/DL (ref 65–140)
GLUCOSE SERPL-MCNC: 295 MG/DL (ref 65–140)

## 2018-08-05 PROCEDURE — 82948 REAGENT STRIP/BLOOD GLUCOSE: CPT

## 2018-08-05 PROCEDURE — 99232 SBSQ HOSP IP/OBS MODERATE 35: CPT | Performed by: INTERNAL MEDICINE

## 2018-08-05 RX ADMIN — OXYCODONE HYDROCHLORIDE 2.5 MG: 5 TABLET ORAL at 07:54

## 2018-08-05 RX ADMIN — ENOXAPARIN SODIUM 40 MG: 40 INJECTION SUBCUTANEOUS at 08:06

## 2018-08-05 RX ADMIN — OXYCODONE HYDROCHLORIDE 2.5 MG: 5 TABLET ORAL at 21:31

## 2018-08-05 RX ADMIN — OXYCODONE HYDROCHLORIDE 2.5 MG: 5 SOLUTION ORAL at 13:50

## 2018-08-05 RX ADMIN — ASPIRIN AND EXTENDED-RELEASE DIPYRIDAMOLE 1 CAPSULE: 25; 200 CAPSULE ORAL at 17:29

## 2018-08-05 RX ADMIN — OLANZAPINE 20 MG: 10 TABLET, FILM COATED ORAL at 21:38

## 2018-08-05 RX ADMIN — INSULIN LISPRO 2 UNITS: 100 INJECTION, SOLUTION INTRAVENOUS; SUBCUTANEOUS at 13:49

## 2018-08-05 RX ADMIN — INSULIN LISPRO 2 UNITS: 100 INJECTION, SOLUTION INTRAVENOUS; SUBCUTANEOUS at 21:36

## 2018-08-05 RX ADMIN — PANTOPRAZOLE SODIUM 40 MG: 40 TABLET, DELAYED RELEASE ORAL at 07:52

## 2018-08-05 RX ADMIN — AMANTADINE HYDROCHLORIDE 50 MG: 100 TABLET ORAL at 08:07

## 2018-08-05 RX ADMIN — DONEPEZIL HYDROCHLORIDE 5 MG: 5 TABLET, FILM COATED ORAL at 21:40

## 2018-08-05 RX ADMIN — LISINOPRIL 10 MG: 5 TABLET ORAL at 07:56

## 2018-08-05 RX ADMIN — CITALOPRAM HYDROBROMIDE 20 MG: 20 TABLET ORAL at 08:06

## 2018-08-05 RX ADMIN — ASPIRIN AND EXTENDED-RELEASE DIPYRIDAMOLE 1 CAPSULE: 25; 200 CAPSULE ORAL at 08:06

## 2018-08-05 NOTE — PROGRESS NOTES
[facility] Internal Medicine Progress Note  Patient: Sri Osorio 80 y o  male   MRN: [id number]  PCP: Chuck Cifuentes MD  Unit/Bed#: E4 -01 Encounter: [id number]  Date Of Visit: 08/05/18    Assessment:    Principal Problem:    Pancreatic cancer [facility])  Active Problems:    Dementia    Hypertension    Park[facility]'s disease ([facility] )    History of CVA (cerebrovascular accident)    Diabetes mellitus, insulin dependent (IDDM), uncontrolled ([facility] )    Sleep disorder      Plan:       This is an 43-year-old male with past medical history significant for hypertension, diabetes mellitus, Parkinson's, dementia, CVA admitted on 07/31/2018 for fall at home      1 )  Mechanical fall  -patient fell wall try to go to the bathroom via walker  -likely secondary to lethargy and recent weight loss  -no fractures     2 )  Pancreatic mass  -imaging reveals 3 3 cm soft tissue mass at mid pancreatic body appears to be encasing adjacent celiac and superior mesenteric arteries, atrophy and marked ductal dilation of distal pancreas findings highly suspicious for malignancy  -family aware, family would not like to tele patient at this time  -had a detailed discussion with patient's daughter, Natalie Cazares, at bedside regarding diagnosis    -explained that given patient's age, weakness, unclear whether patient would tolerate further workup or treatment including biopsy and chemotherapy -oncology consult appreciated  -discussed with daughter today who states that her and her brother have made the decision for home hospice  -palliative care input appreciated     3 )  Hypertension  -continue with Norvasc and monitor blood pressure     4 )  Parkinson's dementia  -continue with Aricept and Sinemet     5 )  History of CVA  -continue Aggrenox     6 )  Diabetes mellitus  -patient having episodes of hypoglycemia therefore will discontinue insulin  -monitor accuchecks, sliding scale for coverage     7 )  Suprapubic pain  -bladder scan revealed about 250 cc  -will monitor urine output     8 )  Stage II sacral decubitus  -wound evaluation    9 )  Encephalopathy  -likely metabolic secondary to history of Parkinson's and dementia, delirium due to being new environment and pancreatic cancer  -no signs of any infection, CT head x2 have been benign    VTE Pharmacologic Prophylaxis:   Pharmacologic: lovenox    Patient Centered Rounds: I have performed bedside rounds with nursing staff today  Education and Discussions with Family / Patient: daughterIgnacio at bedside    Time Spent for Care: 20 minutes  More than 50% of total time spent on counseling and coordination of care as described above  Current Length of Stay: 5 day(s)    Current Patient Status: Inpatient   Certification Statement: The patient will continue to require additional inpatient hospital stay due to awaiting hospice    Discharge Plan / Estimated Discharge Date: 24-48 hours    Code Status: Level 3 - DNAR and DNI      Subjective:   Patient seen and examined at bedside, no acute distress  Objective:     Vitals:   Temp (24hrs), Av 4 °F (35 8 °C), Min:95 9 °F (35 5 °C), Max:97 °F (36 1 °C)    HR:  [72-98] 98  Resp:  [18] 18  BP: (162-174)/(60-98) 165/83  SpO2:  [95 %-99 %] 99 %  Body mass index is 22 57 kg/m²  Input and Output Summary (last 24 hours): Intake/Output Summary (Last 24 hours) at 18 1227  Last data filed at 18 0701   Gross per 24 hour   Intake                0 ml   Output              300 ml   Net             -300 ml       Physical Exam:    Constitutional: Patient is in no acute distress  HEENT:  Normocephalic, atraumatic, EOMI, PERRLA, no scleral icterus, no pallor, moist oral mucosa  Neck:  Supple, no masses, no thyromegaly, no bruits Normal range of motion  Lymph nodes:  No lymphadenopathy  Cardiovascular: Normal S1S2, RRR, No murmurs/rubs/gallops appreciated    Pulmonary: Clear to auscultation bilaterally, No rhonchi/rales/wheezing appreciated  Abdominal: Soft, Bowel sounds present, Non-tender, Non-distended, No rebound/guarding, no hepatomegaly   Musculoskeletal: No tenderness/abnormality   Extremities:  No cyanosis, clubbing or edema  Peripheral pulses palpable and equal bilaterally  Neurological: Cranial nerves II-XII grossly intact, sensation intact, otherwise no focal neurological symptoms  Skin: Skin is warm and dry, no rashes  Additional Data:     Labs:      Results from last 7 days  Lab Units 08/02/18  0422   WBC Thousand/uL 9 25   HEMOGLOBIN g/dL 9 0*   HEMATOCRIT % 29 9*   PLATELETS Thousands/uL 174   NEUTROS PCT % 70   LYMPHS PCT % 19   MONOS PCT % 8   EOS PCT % 3       Results from last 7 days  Lab Units 08/02/18  0422 08/01/18  0508   SODIUM mmol/L 143 141   POTASSIUM mmol/L 3 2* 3 2*   CHLORIDE mmol/L 108 107   CO2 mmol/L 27 25   BUN mg/dL 19 18   CREATININE mg/dL 1 00 0 81   CALCIUM mg/dL 8 2* 8 2*   TOTAL PROTEIN g/dL  --  6 4   BILIRUBIN TOTAL mg/dL  --  0 95   ALK PHOS U/L  --  341*   ALT U/L  --  9*   AST U/L  --  32   GLUCOSE RANDOM mg/dL 90 180*       Results from last 7 days  Lab Units 07/31/18  1751   INR  1 63*        I Have Reviewed All Lab Data Listed Above  Recent Cultures (last 7 days):       Results from last 7 days  Lab Units 07/31/18  1849 07/31/18  1751   BLOOD CULTURE  No Growth After 4 Days  No Growth After 4 Days         Last 24 Hours Medication List:     Current Facility-Administered Medications:  Amantadine HCl 50 mg Oral Daily Wm Bob PA-C   amLODIPine 10 mg Oral HS Wm Bob PA-C   aspirin-dipyridamole 1 capsule Oral BID Wm Bob PA-C   Carbidopa-Levodopa ER 1 capsule Oral 9 times per day Keara Muse MD   citalopram 20 mg Oral Daily Wm Bob PA-C   donepezil 5 mg Oral HS Wm Bob PA-C   enoxaparin 40 mg Subcutaneous Q24H Jihan Gentile MD   hydrALAZINE 5 mg Intravenous Q6H PRN SANCHEZ Bella   insulin lispro 1-5 Units Subcutaneous TID AC Wm Bob PA-C   insulin lispro 1-5 Units Subcutaneous HS Kristen Mann PA-C   lisinopril 10 mg Oral Daily Kristen Mann PA-C   OLANZapine 20 mg Oral HS Kristen Mann PA-C   ondansetron 4 mg Intravenous Q6H PRN Kristen Mann PA-C   oxyCODONE 2 5 mg Oral BID Okarche Ro Candler   oxyCODONE 2 5 mg Oral Q4H PRN Anna Corona MD   pantoprazole 40 mg Oral Early Morning Kristen Mann PA-C        Today, Patient Was Seen By: Anna Corona MD

## 2018-08-06 ENCOUNTER — TELEPHONE (OUTPATIENT)
Dept: FAMILY MEDICINE CLINIC | Facility: CLINIC | Age: 83
End: 2018-08-06

## 2018-08-06 VITALS
SYSTOLIC BLOOD PRESSURE: 144 MMHG | HEIGHT: 60 IN | HEART RATE: 71 BPM | WEIGHT: 104.28 LBS | OXYGEN SATURATION: 99 % | BODY MASS INDEX: 20.47 KG/M2 | DIASTOLIC BLOOD PRESSURE: 67 MMHG | TEMPERATURE: 98.9 F | RESPIRATION RATE: 18 BRPM

## 2018-08-06 PROBLEM — E43 SEVERE PROTEIN-CALORIE MALNUTRITION (HCC): Status: ACTIVE | Noted: 2018-08-06

## 2018-08-06 LAB — GLUCOSE SERPL-MCNC: 150 MG/DL (ref 65–140)

## 2018-08-06 PROCEDURE — 82948 REAGENT STRIP/BLOOD GLUCOSE: CPT

## 2018-08-06 PROCEDURE — 99239 HOSP IP/OBS DSCHRG MGMT >30: CPT | Performed by: HOSPITALIST

## 2018-08-06 RX ORDER — ONDANSETRON 4 MG/1
4 TABLET, FILM COATED ORAL EVERY 12 HOURS PRN
Qty: 30 TABLET | Refills: 0 | Status: SHIPPED | OUTPATIENT
Start: 2018-08-06

## 2018-08-06 RX ORDER — OMEPRAZOLE 20 MG/1
20 CAPSULE, DELAYED RELEASE ORAL DAILY
Qty: 30 CAPSULE | Refills: 0 | Status: SHIPPED | OUTPATIENT
Start: 2018-08-06

## 2018-08-06 RX ORDER — TAMSULOSIN HYDROCHLORIDE 0.4 MG/1
0.4 CAPSULE ORAL
Qty: 30 CAPSULE | Refills: 0 | Status: SHIPPED | OUTPATIENT
Start: 2018-08-06

## 2018-08-06 RX ORDER — INSULIN GLARGINE 100 [IU]/ML
5 INJECTION, SOLUTION SUBCUTANEOUS
Qty: 600 UNITS | Refills: 0 | Status: SHIPPED | OUTPATIENT
Start: 2018-08-06

## 2018-08-06 RX ORDER — OXYCODONE HYDROCHLORIDE 5 MG/1
2.5 TABLET ORAL 2 TIMES DAILY
Qty: 10 TABLET | Refills: 0 | Status: SHIPPED | OUTPATIENT
Start: 2018-08-06 | End: 2018-08-11 | Stop reason: SDUPTHER

## 2018-08-06 RX ADMIN — CITALOPRAM HYDROBROMIDE 20 MG: 20 TABLET ORAL at 08:35

## 2018-08-06 RX ADMIN — PANTOPRAZOLE SODIUM 40 MG: 40 TABLET, DELAYED RELEASE ORAL at 06:37

## 2018-08-06 RX ADMIN — OXYCODONE HYDROCHLORIDE 2.5 MG: 5 TABLET ORAL at 08:35

## 2018-08-06 RX ADMIN — ENOXAPARIN SODIUM 40 MG: 40 INJECTION SUBCUTANEOUS at 08:35

## 2018-08-06 RX ADMIN — AMANTADINE HYDROCHLORIDE 50 MG: 100 TABLET ORAL at 08:38

## 2018-08-06 RX ADMIN — ASPIRIN AND EXTENDED-RELEASE DIPYRIDAMOLE 1 CAPSULE: 25; 200 CAPSULE ORAL at 08:35

## 2018-08-06 RX ADMIN — LISINOPRIL 10 MG: 5 TABLET ORAL at 08:35

## 2018-08-06 NOTE — SOCIAL WORK
CM spoke with Devota Phalen, [facility], They will be able to send a nurse out tomorrow and patient ok from Hospice standpoint to d/c home  Luis Antonio Crawford, patient's son aware       Cele Cola, MAEVE

## 2018-08-06 NOTE — DISCHARGE INSTR - OTHER ORDERS
The Hospital bed will most likely be delivered on Tuesday 8/7/18  The Nurse from [facility] should also be out on Tuesday

## 2018-08-06 NOTE — DISCHARGE SUMMARY
Discharge Summary - Medical Farzaneh Irvin 80 y o  male MRN: [id number]    [address] [address] MED SURG [address] 441/E4 -* Encounter: [id number]    BRIEF OVERVIEW      Admission Date: 7/31/2018       Discharge Date:  08/06/2018      Admitting Diagnosis:  Mechanical fall    Primary Discharge Diagnosis  Principal Problem:    Pancreatic cancer [facility])  Active Problems:    Dementia    Hypertension    Parkinson's disease ([facility] )    History of CVA (cerebrovascular accident)    Diabetes mellitus, insulin dependent (IDDM), uncontrolled ([facility] )    Sleep disorder    Severe protein-calorie malnutrition ([facility] )      Service:  [facility]    Consulting Providers   Oncology-Dr Morgan  Palliative care-r Winston    Procedures Performed   CT abdomen and pelvis  Impression:       3 3 cm soft tissue mass at the mid pancreatic body which appears to encase the adjacent celiac and superior mesenteric arteries   There is atrophy and marked ductal dilatation of the distal pancreas   The findings are highly suspicious for malignancy         Heterogeneous enhancement of the liver with multiple ill-defined lesions measuring up to 3 9 cm suspicious for metastatic disease       Scattered subcentimeter nodules noted at both lung bases suspicious for metastatic disease   A few peripheral ground glass opacities are also noted at the right lung base suspicious for an infectious or inflammatory process                   Assessment and plan on date of discharge  This is an 51-year-old male with past medical history significant for hypertension, diabetes mellitus, Parkinson's, dementia, CVA admitted on 07/31/2018 for fall at home      1 )  Mechanical fall  -patient fell while trying to go to the bathroom   -likely secondary to lethargy and recent weight loss  -no fractures     2 )  Pancreatic mass with widespread metastasis-liver and lung  -imaging reveals 3 3 cm soft tissue mass at mid pancreatic body appears to be encasing adjacent celiac and superior mesenteric arteries, atrophy and marked ductal dilation of distal pancreas findings highly suspicious for malignancy  -family aware, family would not like to tell patient at this time  -internist palliative care, oncologist had a detailed discussion with patient's daughter, Marco A Kuo,  regarding diagnosis  -explained that given patient's age, weakness, unclear whether patient would tolerate further workup or treatment including biopsy and chemotherapy  -daughter and her brother have made the decision for home hospice  -palliative care input appreciated  -pain controlled      3 )  Hypertension  -continue with Norvasc and monitor blood pressure     4 )  Parkinson's dementia  -continue with Aricept and Sinemet     5 )  History of CVA  -continue Aggrenox     6 )  Diabetes mellitus  -patient having episodes of hypoglycemia therefore Lantus decreased to 5 units daily        8 )  Stage II sacral decubitus    9) severe protein calorie malnutrition secondary to pancreatic cancer-continue nutritional supplements     Stable for discharge home with home hospice    Discharge Condition: Improved    Discharge Disposition: Home with Home Hospice    Discharge summary:   Maci Brown is a 51-year-old male with a past medical history of dementia, Parkinson's disease, history of CVA who was noted by his family to be weak and losing weight over the past few months  He was admitted on 07/31/2018 with a mechanical fall  During evaluation for this fall a CT of the abdomen and pelvis was done which revealed a 3 3 cm soft tissue mass at mid pancreatic body appears to be encasing adjacent celiac and superior mesenteric arteries, atrophy and marked ductal dilation of distal pancreas findings highly suspicious for malignancy-with metastasis to the liver and lung    This was reviewed by Oncology and felt that this favored pancreatic cancer with liver metastatic lesion however it could also be a metastatic cancer from an unknown primary  After a long discussion with the patient's family regarding options-including basic workup for diagnosis, hospice, progressive treatment-family was made aware that since this was metastatic it was not curable and the patient was not a candidate for aggressive chemotherapy  After discussion with other family members they elected for home hospice and patient will be discharged home today  He is pain free, without any shortness of breath  Hospice liaison will follow the patient on discharge  His blood sugars were noted to be trending low hence his Lantus was decreased from 20 units at night to 5 units at night  Blood sugars on discharge are at around 1:50 a m  He will be discharged home on oxycodone  He remains stable for discharge            Discharge Medications   Please see Medical Reconciliation Discharge Form    Discharge Follow Up Appointments:   PCP    Discharge  Statement   Total Time Spent today including physical exam, discussion with patient and family, and discharge arrangements/care 45 minutes

## 2018-08-06 NOTE — HOSPICE NOTE
Call placed to [facility] this morning regarding having the current bed picked up  They are unable to inform liaison when someone will be out but they are saying it will most likely be tomorrow  When a time is confirmed liaison will order hospital bed from [facility]  This should not hold up patients discharge if medically cleared

## 2018-08-06 NOTE — PROGRESS NOTES
Progress Note - Molly Moore 80 y o  male MRN: [id number]    Unit/Bed#: E4 -01 Encounter: [id number]    Principal Problem:    Pancreatic cancer [facility])  Active Problems:    Dementia    Hypertension    Parkinson's disease ([facility] )    History of CVA (cerebrovascular accident)    Diabetes mellitus, insulin dependent (IDDM), uncontrolled ([facility] )    Sleep disorder    Assessment/Plan: This is an 80-year-old male with past medical history significant for hypertension, diabetes mellitus, Parkinson's, dementia, CVA admitted on 07/31/2018 for fall at home      1 )  Mechanical fall  -patient fell while trying to go to the bathroom   -likely secondary to lethargy and recent weight loss  -no fractures     2 )  Pancreatic mass  -imaging reveals 3 3 cm soft tissue mass at mid pancreatic body appears to be encasing adjacent celiac and superior mesenteric arteries, atrophy and marked ductal dilation of distal pancreas findings highly suspicious for malignancy  -family aware, family would not like to tell patient at this time  -internist palliative care, oncologist had a detailed discussion with patient's daughter, Derek Villavicencio,  regarding diagnosis    -explained that given patient's age, weakness, unclear whether patient would tolerate further workup or treatment including biopsy and chemotherapy -  -daughter and her brother have made the decision for home hospice  -palliative care input appreciated  -pain controlled      3 )  Hypertension  -continue with Norvasc and monitor blood pressure     4 )  Parkinson's dementia  -continue with Aricept and Sinemet     5 )  History of CVA  -continue Aggrenox     6 )  Diabetes mellitus  -patient having episodes of hypoglycemia therefore Lantus decreased to 5 units daily       8 )  Stage II sacral decubitus    Stable for discharge home with home hospice     VTE Pharmacologic Prophylaxis:   Pharmacologic: lovenox     Patient Centered Rounds: I have performed bedside rounds with nursing staff today       Subjective: Wife acted as -patient denies any pain  Comfortable  Denies any loss of appetite  Will go home today with home hospice  Patient has a pancreatic mass encasing the celiac, superior mesenteric arteries  Physical Exam:   Vitals: Blood pressure 144/67, pulse 71, temperature 98 9 °F (37 2 °C), temperature source Temporal, resp  rate 18, height 4' 9" (1 448 m), weight 47 3 kg (104 lb 4 4 oz), SpO2 99 %  ,Body mass index is 22 57 kg/m²  Gen:  Pleasant, non-tachypnic, non-dyspnic  Conversant  Heart: regular rate and rhythm, S1S2 present, no murmur, rub or gallop  Lungs: clear to ausculatation bilaterally  No wheezing, crackless, or rhonchi  No accessory muscle use or respiratory distress  Abd: soft, non-tender, non-distended  NABS, no guarding, rebound or peritoneal signs  Extremities: no clubbing, cyanosis or edema  2+pedal pulses bilaterally  Full range of motion  Neuro: awake, alert and oriented  Cranial nerves 2-12 intact  Strength and sensation grossly intact  Skin: warm and dry: no petechiae, purpura and rash      LABS:     Results from last 7 days  Lab Units 08/02/18  0422 08/01/18  0508 07/31/18  1751   WBC Thousand/uL 9 25 10 57* 8 95   HEMOGLOBIN g/dL 9 0* 9 3* 9 2*   HEMATOCRIT % 29 9* 30 3* 30 1*   PLATELETS Thousands/uL 174 187 171       Results from last 7 days  Lab Units 08/02/18  0422 08/01/18  0508 07/31/18  1751   SODIUM mmol/L 143 141 141   POTASSIUM mmol/L 3 2* 3 2* 4 2   CHLORIDE mmol/L 108 107 106   CO2 mmol/L 27 25 26   BUN mg/dL 19 18 19   CREATININE mg/dL 1 00 0 81 0 87   GLUCOSE RANDOM mg/dL 90 180* 197*   CALCIUM mg/dL 8 2* 8 2* 8 2*     No intake or output data in the 24 hours ending 08/06/18 1054        Current Facility-Administered Medications:  Amantadine HCl 50 mg Oral Daily Trudy Cassidy PA-C   amLODIPine 10 mg Oral HS Trudy Cassidy PA-C   aspirin-dipyridamole 1 capsule Oral BID Trudy Cassidy PA-C   Carbidopa-Levodopa ER 1 capsule Oral 9 times per day Gerri Mcclure MD   citalopram 20 mg Oral Daily Papito Washburn PA-C   donepezil 5 mg Oral HS Papito Washburn PA-C   enoxaparin 40 mg Subcutaneous Q24H Monster Mai MD   hydrALAZINE 5 mg Intravenous Q6H PRN SANCHEZ Desir   insulin lispro 1-5 Units Subcutaneous TID AC Papito Washburn PA-C   insulin lispro 1-5 Units Subcutaneous HS Papito Washburn PA-C   lisinopril 10 mg Oral Daily Papito Washburn PA-C   OLANZapine 20 mg Oral HS Papito Washburn PA-C   ondansetron 4 mg Intravenous Q6H PRN Papito Washburn PA-C   oxyCODONE 2 5 mg Oral BID Ronald Sullivan   oxyCODONE 2 5 mg Oral Q4H PRN Gerri Mcclure MD   pantoprazole 40 mg Oral Early Morning Papito Washburn PA-C       Family update- wife in the room-updated

## 2018-08-07 ENCOUNTER — TRANSITIONAL CARE MANAGEMENT (OUTPATIENT)
Dept: FAMILY MEDICINE CLINIC | Facility: CLINIC | Age: 83
End: 2018-08-07

## 2018-08-09 ENCOUNTER — TELEPHONE (OUTPATIENT)
Dept: FAMILY MEDICINE CLINIC | Facility: CLINIC | Age: 83
End: 2018-08-09

## 2018-08-09 NOTE — TELEPHONE ENCOUNTER
I called Rodrigo Arce  She is away for the day     She told me that her father  is now on hospice and is eating and talking     I will try call her next week to discuss specifics about his meds     [phone]

## 2018-08-11 DIAGNOSIS — C25.9 MALIGNANT NEOPLASM OF PANCREAS, UNSPECIFIED LOCATION OF MALIGNANCY (HCC): ICD-10-CM

## 2018-08-11 RX ORDER — OXYCODONE HYDROCHLORIDE 5 MG/1
2.5 TABLET ORAL EVERY 4 HOURS PRN
Qty: 30 TABLET | Refills: 0 | Status: SHIPPED | OUTPATIENT
Start: 2018-08-11 | End: 2018-08-21

## 2018-08-13 NOTE — TELEPHONE ENCOUNTER
Mirlande Monique, the pt's care manager at hospice called to follow up on this question  Please advise

## 2018-08-14 NOTE — TELEPHONE ENCOUNTER
Spoke to Kyrie     He is at home in Hospice , still eating / drinking but sleepier at night     Off of vit d, aricept but on rytary 9 times a day, script filled in July with 5 refills      Started on omeprazole/ oxycodone       Can continue on rytary as long as he can swallow tablets     Family is home with him     If anything changes , Kelton or Kyrie are to call me

## 2018-08-14 NOTE — TELEPHONE ENCOUNTER
Pt's daughter returning your call  Unfortunately, call was disconnected while on hold  She did request a call at your earliest convenience

## 2018-08-15 DIAGNOSIS — R32 URINARY INCONTINENCE, UNSPECIFIED TYPE: Primary | ICD-10-CM

## 2018-08-20 ENCOUNTER — TELEPHONE (OUTPATIENT)
Dept: NEUROLOGY | Facility: CLINIC | Age: 83
End: 2018-08-20

## 2018-08-20 NOTE — TELEPHONE ENCOUNTER
Pt's daughter called and wanted to let you know that patient passed away last Thursday, and she wanted to relay the message that you were such a great doctor and she was always very happy with our office       Service is tomorrow at  [address]  [address]

## 2018-08-27 RX ORDER — LANCETS 28 GAUGE
EACH MISCELLANEOUS
Qty: 100 EACH | Refills: 5 | OUTPATIENT
Start: 2018-08-27

## 2018-11-27 NOTE — TELEPHONE ENCOUNTER
@[place] we have Rytary 36 25/145mg 25cap/bottle [id number]; 23 75/95mg 25caps/bottle [id number] & 100caps/bottle x2 bottles  @[place] office we have none    PAs still pending  Pt is aware of update @this time    Please advise
Daughter will  samples now  rytary 36 25/145mg 4bottlers w/25caps each  Lot [id number]  Exp 12/19    Bag left @ w/instructions per below rec
Great , she can go back to prior dose of rytary
Patient daughter picked up meds showed PA ID
Patient's daughter calling to get status on PA for rytary, informed her that we were still waiting for determination, and I would call the plan to get status update  She asked for a call back @[phone]  Spoke to [employer] and they informed me that rytary PA had been approved on May 21 2018  Patient's daughter made aware 
Pt daughter called to report that pt's rytary needs a prior authorization and that she was unable to pick this up from the pharmacy  I did complete PA on cover my meds for both [place] medicaid as well as [place] medicare assured and marked both as urgent  Pt's daughter states that he only has enough medication for today  I did check the [place] office and we do not have any samples available in the strength that he takes, Elvia Solo also checked [place] and there were no samples there either  Please advise what she should do in the event the prior authorization is not approved by tomorrow?
Two options     1  Can take 36 25/ 145 tablets 15 a day   4 in 7;00am   2 at 10, 2 at noon  2 at 2pm  2 at 6pm and 3 at bedtime  For next few days         Or   Can try      25/250 tablets sinemet ( generic)   Total of 9 pills in 24 hours     2 in 1 at7;00  10;00am ,  1 at noon 2  at 2 pm , 1 at 6pm and 2 at bedtime    The 25/250 may not work as well   Cherelle Rosarioon      Someone needs to  the rytary samples  If Wilbert Juarez agrees
What are the doses avialable in sample form in the offices ?      Other wise we will need to go back to sinemet
home

## 2023-07-14 NOTE — TELEPHONE ENCOUNTER
Risk Factors for Heart Disease   Heart disease includes coronary artery disease which involves damage to the heart arteries. It also includes congestive heart failure and other heart issues. The coronary arteries provide the oxygen your heart needs to pump blood to the rest of your body.   A risk factor is something that increases your chance of having a disease. Risk factors such as smoking or high cholesterol levels can damage arteries. You can’t control some heart risk factors. These include your age or having a family history of heart disease. But there are many heart risk factors you can control. This can reduce your risk for heart disease.   Unhealthy cholesterol levels   Cholesterol is a fat-like substance in your blood. It can build up along the artery walls. This is called plaque. Over time, plaque narrows the arteries. This reduces blood flow to your heart or brain. If a blood clot forms or a piece of plaque breaks off, it can block the artery. This can cause a heart attack or stroke. Your risk of heart disease goes up if you have high levels of LDL (\"bad\") cholesterol. Or if you have high levels of triglycerides. This is another fatty substance that can build up. You’re also at risk if you have low HDL (\"good\") cholesterol. HDL helps clear the bad cholesterol away. You're at risk if you have any of these:    HDL cholesterol of 50 mg/dL or lower  LDL cholesterol of 100 mg/dL or higher  Triglycerides of 150 mg/dL or higher  Unhealthy diet  Diets high in saturated fats, trans fats, and cholesterol have been linked to heart disease and coronary artery disease. By cutting back on saturated fat and trans fat, you can lower your LDL (\"bad\") cholesterol and triglyceride levels. LDL is one of the main substances that causes heart attacks. Stay away from most trans fatty acids by eating less of these foods:   Margarine  Cookies  Crackers  French fries  Doughnuts  Other snack foods that have partially hydrogenated  GUSTAVO spoke with Fred at [employer]  Fred advised that a Face to Face appointment would be needed to determine if patient would benefit from a hospital bed  Please advise if you feel patient would meet tone of the following criteria for medical neccessity:    1)  Beneficiary has a medical condiiton which requires positioning of the body in ways not feasible in an ordinary bed  2) Beneficiary has a medical condiiton which requires positioning of the body in ways not feasible in an ordinary bed in order to alleviate pain  3) The beneficiary requires the head of the bed to be elevated more than 30 degrees most of the time due to CHF, chronic pulmonary disease, or problems with aspiration     4) The beneficiary requires traction equipment, which can only be attached to a hospital bed  Please advise if any of the above criteria would apply for patient and if so, this would need to be documented in an office visit note  If yes, please advise if you would like to see this patient for the face to face or if your would want an advanced practitioner to do so (and if so, who)? oils    Replace less healthy foods by eating a diet with a lot of:   Fruits  Legumes  Vegetables  Whole grains  Nuts  Lean fish or lean animal protein    Drinking too much alcohol also raises the risk for heart disease. It can raise blood pressure levels. And it raises triglyceride levels.   Smoking  This is the most important risk factor you can change. You’re at risk if you use any kind of tobacco or nicotine. This includes:   Cigarettes  E-cigarettes  Chew tobacco  Cigars  Pipe    If you smoke, it's never too late to help your heart. Ask your healthcare provider about nicotine replacement products and smoking cessation support. Quitting smoking is the single biggest way to reduce your risk of coronary artery disease.   High blood pressure   High blood pressure occurs when blood pushes too hard against artery walls. This damages the artery walls. Scar tissue forms as it heals. This makes the arteries stiff and weak. Plaque sticks to the scarred tissue. This narrows and hardens the arteries. High blood pressure also makes your heart work harder to get blood out to the body. It raises your risk of heart attack and especially stroke. The brain tissue is very sensitive to high blood pressure damage. You're at risk if your blood pressure is 120/80 or higher. Experts now label blood pressure between 130 to 139/80 to 89 as stage 1 hypertension.   Chronic kidney disease  Chronic kidney disease is another cardiac risk factor. Talk with your healthcare provider about ways to control kidney disease if you have it.   Negative emotions   Chronic stress, pent-up anger, and other negative emotions have been linked to heart disease. This is because stress increases the levels of a hormone that increase the demand on your heart. Over time, these emotions could raise your heart disease risk.   Metabolic syndrome   This is caused by a mix of certain risk factors. It puts you at extra high risk of heart disease, stroke, and diabetes.  You have metabolic syndrome if you have 3 or more of these:   Low HDL cholesterol  High triglycerides  High blood pressure  High blood sugar  Extra weight around the waist    Diabetes  Diabetes occurs when you have high levels of sugar (glucose) in your blood. This can damage arteries if not kept under control. Having diabetes also makes you more likely to have a silent heart attack--one without any symptoms.   The A1C test is a common blood test that diagnoses type 1 and type 2 diabetes. It can also check how well you are managing diabetes. If your A1C level is between 5.7 and 6.4, you have prediabetes. Once your A1C reaches 6.5, you have diabetes. Your healthcare provider will help you figure out what your A1C should be. Your target number will depend on your age, general health, and other factors. Your treatment plan may need changes if your current number is too high.   Extra weight   Extra weight makes other risk factors, such as diabetes, more likely. Extra weight around the waist or stomach increases your heart disease risk the most.   You're at risk if your:   Waist measures more than 35 inches (women) or 40 inches (men)  Body mass index (BMI) is higher than 25.    Lack of physical activity   You're at risk if you exercise less than 40 minutes per day, on fewer than 3 to 4 days a week.   When you’re not active, you’re more likely to develop diabetes, high blood pressure, high cholesterol levels, and extra weight.   Most people with heart disease have more than one risk factor. As your number of risk factors increases, so does your risk for heart disease and coronary artery disease. Talk with your healthcare provider about your heart risk factors. Find out how you can improve them or remove them completely.   [employer] last reviewed this educational content on 7/1/2019 © 2000-2020 [employer] [facility]. [address] [place] [address], [place] [address]. All rights reserved. This information is not intended as a  substitute for professional medical care. Always follow your healthcare professional's instructions.

## 2024-01-01 NOTE — MISCELLANEOUS
To Whom it May Concern,    Please excuse Conor Oliveira from work on 08/30/17 as it was medically necessary that he care for his father on this day  For any further questions please contact our office at [phone]  Sincerely,    JEANETTE Ferguson O  Electronically signed by: Angella Canada DO  Aug 30 2017 11:37AM EST Author
Normal for race